# Patient Record
Sex: FEMALE | Race: WHITE | NOT HISPANIC OR LATINO | Employment: OTHER | URBAN - METROPOLITAN AREA
[De-identification: names, ages, dates, MRNs, and addresses within clinical notes are randomized per-mention and may not be internally consistent; named-entity substitution may affect disease eponyms.]

---

## 2017-01-19 ENCOUNTER — OFFICE VISIT (OUTPATIENT)
Dept: FAMILY MEDICINE | Facility: CLINIC | Age: 22
End: 2017-01-19
Payer: COMMERCIAL

## 2017-01-19 VITALS
TEMPERATURE: 97.7 F | DIASTOLIC BLOOD PRESSURE: 66 MMHG | HEIGHT: 68 IN | SYSTOLIC BLOOD PRESSURE: 100 MMHG | BODY MASS INDEX: 24.14 KG/M2 | HEART RATE: 71 BPM | WEIGHT: 159.31 LBS

## 2017-01-19 DIAGNOSIS — R30.0 DYSURIA: Primary | ICD-10-CM

## 2017-01-19 DIAGNOSIS — Z12.4 SCREENING FOR CERVICAL CANCER: ICD-10-CM

## 2017-01-19 LAB
ALBUMIN UR-MCNC: NEGATIVE MG/DL
APPEARANCE UR: CLEAR
BILIRUB UR QL STRIP: NEGATIVE
COLOR UR AUTO: YELLOW
GLUCOSE UR STRIP-MCNC: NEGATIVE MG/DL
HGB UR QL STRIP: NEGATIVE
KETONES UR STRIP-MCNC: NEGATIVE MG/DL
LEUKOCYTE ESTERASE UR QL STRIP: NEGATIVE
MICRO REPORT STATUS: NORMAL
NITRATE UR QL: NEGATIVE
PH UR STRIP: 7 PH (ref 5–7)
SP GR UR STRIP: 1.01 (ref 1–1.03)
SPECIMEN SOURCE: NORMAL
URN SPEC COLLECT METH UR: NORMAL
UROBILINOGEN UR STRIP-ACNC: 1 EU/DL (ref 0.2–1)
WET PREP SPEC: NORMAL

## 2017-01-19 PROCEDURE — G0145 SCR C/V CYTO,THINLAYER,RESCR: HCPCS | Performed by: FAMILY MEDICINE

## 2017-01-19 PROCEDURE — 87210 SMEAR WET MOUNT SALINE/INK: CPT | Performed by: FAMILY MEDICINE

## 2017-01-19 PROCEDURE — 87491 CHLMYD TRACH DNA AMP PROBE: CPT | Performed by: FAMILY MEDICINE

## 2017-01-19 PROCEDURE — 99213 OFFICE O/P EST LOW 20 MIN: CPT | Performed by: FAMILY MEDICINE

## 2017-01-19 PROCEDURE — 87591 N.GONORRHOEAE DNA AMP PROB: CPT | Performed by: FAMILY MEDICINE

## 2017-01-19 PROCEDURE — 81003 URINALYSIS AUTO W/O SCOPE: CPT | Performed by: FAMILY MEDICINE

## 2017-01-19 RX ORDER — VENLAFAXINE HYDROCHLORIDE 225 MG/1
TABLET, EXTENDED RELEASE ORAL DAILY
COMMUNITY
Start: 2016-12-16 | End: 2018-03-29

## 2017-01-19 RX ORDER — ALPRAZOLAM 0.25 MG
TABLET ORAL 3 TIMES DAILY PRN
COMMUNITY
Start: 2016-12-16 | End: 2022-12-01

## 2017-01-19 RX ORDER — PRAZOSIN HYDROCHLORIDE 1 MG/1
CAPSULE ORAL
COMMUNITY
Start: 2017-01-13 | End: 2017-03-21

## 2017-01-19 RX ORDER — HYDROXYZINE HYDROCHLORIDE 25 MG/1
TABLET, FILM COATED ORAL
COMMUNITY
Start: 2017-01-06 | End: 2017-03-21

## 2017-01-19 NOTE — Clinical Note
[facility]  [address] [facility] [facility] [address] [phone]      January 25, 2017    Kathi Enriquez  [address] [address] [address]          Dear Kathi,    I am happy to inform you that your recent cervical cancer screening test (PAP smear) was normal.      Preventative screening such as this helps insure your health for years to come.  This test should be repeated in 3 years unless otherwise directed.      You will still need to return to the clinic every year for your annual exam and other preventive tests.    Please contact the clinic if you have further questions.      Sincerely,    Lilia Woodard MD/[facility]

## 2017-01-19 NOTE — NURSING NOTE
"Chief Complaint   Patient presents with     Urinary Problem       Initial /66 mmHg  Pulse 71  Temp(Src) 97.7  F (36.5  C) (Oral)  Ht 5' 7.5\" (1.715 m)  Wt 159 lb 5 oz (72.264 kg)  BMI 24.57 kg/m2  Breastfeeding? No Estimated body mass index is 24.57 kg/(m^2) as calculated from the following:    Height as of this encounter: 5' 7.5\" (1.715 m).    Weight as of this encounter: 159 lb 5 oz (72.264 kg).  BP completed using cuff size: erwin Patel CMA          "

## 2017-01-19 NOTE — PROGRESS NOTES
"  SUBJECTIVE:                                                    Kathi Enriquez is a 21 year old female who presents to clinic today for the following health issues:    URINARY TRACT SYMPTOMS      Duration: 3-4 days    Description  dysuria, odor and color, cloudy    Intensity:  moderate    Accompanying signs and symptoms:  Fever/chills: no   Flank pain no   Nausea and vomiting: no   Vaginal symptoms: none  Abdominal/Pelvic Pain: YES    History  History of frequent UTI's: YES  History of kidney stones: no   Sexually Active: YES  Possibility of pregnancy: don't think so    Precipitating or alleviating factors: None    Therapies tried and outcome: increase fluid intake   Outcome: not helping       Different than her typical UTIs. Not having frequency. Also having more pelvic pain than she typically does.     No new vaginal discharge. New sexual partner.       Problem list and histories reviewed & adjusted, as indicated.  Additional history: none    Problem list, Medication list, Allergies, and Medical/Social/Surgical histories reviewed in EPIC and updated as appropriate.    ROS:  Constitutional, HEENT, cardiovascular, pulmonary, gi and gu systems are negative, except as otherwise noted.    OBJECTIVE:                                                    /66 mmHg  Pulse 71  Temp(Src) 97.7  F (36.5  C) (Oral)  Ht 5' 7.5\" (1.715 m)  Wt 159 lb 5 oz (72.264 kg)  BMI 24.57 kg/m2  LMP 01/08/2017  Breastfeeding? No  Body mass index is 24.57 kg/(m^2).  GENERAL: healthy, alert and no distress   (female): normal female external genitalia, normal urethral meatus, vaginal mucosa, normal cervix/adnexa/uterus without masses, thick white discharge coating vaginal walls, mild irritation of vulva    Diagnostic Test Results:  Results for orders placed or performed in visit on 01/19/17 (from the past 24 hour(s))   UA reflex to Microscopic and Culture   Result Value Ref Range    Color Urine Yellow     Appearance Urine Clear  "    Glucose Urine Negative NEG mg/dL    Bilirubin Urine Negative NEG    Ketones Urine Negative NEG mg/dL    Specific Gravity Urine 1.015 1.003 - 1.035    Blood Urine Negative NEG    pH Urine 7.0 5.0 - 7.0 pH    Protein Albumin Urine Negative NEG mg/dL    Urobilinogen Urine 1.0 0.2 - 1.0 EU/dL    Nitrite Urine Negative NEG    Leukocyte Esterase Urine Negative NEG    Source Midstream Urine         ASSESSMENT/PLAN:                                                      1. Dysuria - discussed the possibility of a vaginal infection causing pelvic pain, will test today  - UA reflex to Microscopic and Culture  - Neisseria gonorrhoeae PCR  - Chlamydia trachomatis PCR  - Wet prep    2. Screening for cervical cancer  - Pap imaged thin layer screen only - recommended age 21 - 24 years      Lilia Woodard MD  [facility]

## 2017-01-20 LAB
C TRACH DNA SPEC QL NAA+PROBE: NORMAL
N GONORRHOEA DNA SPEC QL NAA+PROBE: NORMAL
SPECIMEN SOURCE: NORMAL
SPECIMEN SOURCE: NORMAL

## 2017-01-20 ASSESSMENT — PATIENT HEALTH QUESTIONNAIRE - PHQ9: SUM OF ALL RESPONSES TO PHQ QUESTIONS 1-9: 23

## 2017-01-24 LAB
COPATH REPORT: NORMAL
PAP: NORMAL

## 2017-03-21 ENCOUNTER — OFFICE VISIT (OUTPATIENT)
Dept: FAMILY MEDICINE | Facility: CLINIC | Age: 22
End: 2017-03-21
Payer: COMMERCIAL

## 2017-03-21 DIAGNOSIS — Z11.3 SCREEN FOR STD (SEXUALLY TRANSMITTED DISEASE): Primary | ICD-10-CM

## 2017-03-21 DIAGNOSIS — B96.89 BACTERIAL VAGINOSIS: ICD-10-CM

## 2017-03-21 DIAGNOSIS — N76.0 BACTERIAL VAGINOSIS: ICD-10-CM

## 2017-03-21 LAB
ALBUMIN UR-MCNC: NEGATIVE MG/DL
APPEARANCE UR: CLEAR
BILIRUB UR QL STRIP: NEGATIVE
COLOR UR AUTO: YELLOW
GLUCOSE UR STRIP-MCNC: NEGATIVE MG/DL
HGB UR QL STRIP: NEGATIVE
KETONES UR STRIP-MCNC: NEGATIVE MG/DL
LEUKOCYTE ESTERASE UR QL STRIP: NEGATIVE
MICRO REPORT STATUS: ABNORMAL
NITRATE UR QL: NEGATIVE
PH UR STRIP: 7.5 PH (ref 5–7)
SP GR UR STRIP: 1.01 (ref 1–1.03)
SPECIMEN SOURCE: ABNORMAL
URN SPEC COLLECT METH UR: ABNORMAL
UROBILINOGEN UR STRIP-ACNC: 0.2 EU/DL (ref 0.2–1)
WET PREP SPEC: ABNORMAL

## 2017-03-21 PROCEDURE — 87210 SMEAR WET MOUNT SALINE/INK: CPT | Performed by: PHYSICIAN ASSISTANT

## 2017-03-21 PROCEDURE — 87591 N.GONORRHOEAE DNA AMP PROB: CPT | Performed by: PHYSICIAN ASSISTANT

## 2017-03-21 PROCEDURE — 87491 CHLMYD TRACH DNA AMP PROBE: CPT | Performed by: PHYSICIAN ASSISTANT

## 2017-03-21 PROCEDURE — 81003 URINALYSIS AUTO W/O SCOPE: CPT | Performed by: PHYSICIAN ASSISTANT

## 2017-03-21 PROCEDURE — 99213 OFFICE O/P EST LOW 20 MIN: CPT | Performed by: PHYSICIAN ASSISTANT

## 2017-03-21 RX ORDER — METRONIDAZOLE 500 MG/1
500 TABLET ORAL 2 TIMES DAILY
Qty: 14 TABLET | Refills: 0 | Status: SHIPPED | OUTPATIENT
Start: 2017-03-21 | End: 2017-08-03

## 2017-03-21 NOTE — PROGRESS NOTES
SUBJECTIVE:                                                    Kathi Enriquez is a 21 year old female who presents to clinic today for the following health issues:      Vaginal Symptoms     Onset: Last Thursday    Description:  Vaginal Discharge: curd-like green in color   Itching (Pruritis): no   Burning sensation:  no   Odor: YES    Accompanying Signs & Symptoms:  Pain with Urination: no   Abdominal Pain: YES  Fever: no    History:   Sexually active: YES  New Partner: YES  Possibility of Pregnancy:  Patient's last menstrual period was 03/04/2017.      Precipitating factors:   Recent Antibiotic Use: no     Alleviating factors:     Therapies Tried and outcome:         Problem list and histories reviewed & adjusted, as indicated.  Additional history: as documented    Patient Active Problem List   Diagnosis     GERD (gastroesophageal reflux disease)     Anxiety     Major depressive disorder, recurrent episode, moderate (H)     Suicidal ideation     No past surgical history on file.    Social History   Substance Use Topics     Smoking status: Current Some Day Smoker     Packs/day: 0.25     Types: Cigarettes     Smokeless tobacco: Never Used     Alcohol use Yes     Family History   Problem Relation Age of Onset     CANCER Paternal Grandmother      Lymphoma     CANCER Paternal Grandfather      Pancreatic     DIABETES Father      Type II Diabetes     CANCER Father      Melanoma     CANCER Maternal Grandfather      Brain Tumor, Bladder Cancer     Cancer - colorectal Maternal Grandfather      Asthma Sister      Blood Disease Sister      Blood Clots in the lung           Reviewed and updated as needed this visit by clinical staff  Allergies       Reviewed and updated as needed this visit by Provider         ROS:  Constitutional, HEENT, cardiovascular, pulmonary, gi and gu systems are negative, except as otherwise noted.    OBJECTIVE:                                                    LMP 03/04/2017  There is no height  or weight on file to calculate BMI.  GENERAL APPEARANCE: healthy, alert and no distress  CV: regular rates and rhythm, normal S1 S2, no S3 or S4 and no murmur, click or rub  ABDOMEN: soft, nontender, without hepatosplenomegaly or masses and bowel sounds normal    Results for orders placed or performed in visit on 03/21/17   UA reflex to Microscopic and Culture   Result Value Ref Range    Color Urine Yellow     Appearance Urine Clear     Glucose Urine Negative NEG mg/dL    Bilirubin Urine Negative NEG    Ketones Urine Negative NEG mg/dL    Specific Gravity Urine 1.015 1.003 - 1.035    Blood Urine Negative NEG    pH Urine 7.5 (H) 5.0 - 7.0 pH    Protein Albumin Urine Negative NEG mg/dL    Urobilinogen Urine 0.2 0.2 - 1.0 EU/dL    Nitrite Urine Negative NEG    Leukocyte Esterase Urine Negative NEG    Source Midstream Urine    Wet prep   Result Value Ref Range    Specimen Description Vagina     Wet Prep (A)      No Trichomonas seen  Clue cells seen  No yeast seen      Micro Report Status FINAL 03/21/2017           ASSESSMENT/PLAN:                                                            1. Bacterial vaginosis    - Wet prep  - UA reflex to Microscopic and Culture  - metroNIDAZOLE (FLAGYL) 500 MG tablet; Take 1 tablet (500 mg) by mouth 2 times daily  Dispense: 14 tablet; Refill: 0    2. Screen for STD (sexually transmitted disease)    - Neisseria gonorrhoeae PCR  - Chlamydia trachomatis PCR        Maddie Francois PA-C, AYAZ  [facility]

## 2017-03-21 NOTE — LETTER
[facility]  [address] [place], [address]  [phone]      March 22, 2017    Kathi Enriquez  [address] [address] [address]          Dear Kathi,    The results of your recent tests were normal.  Enclosed is a copy of the results.  It was a pleasure to see you at your last appointment.    If you have any questions or concerns, please call myself or my nurse at [phone].          Sincerely,    Maddie Francois PA-C

## 2017-03-21 NOTE — LETTER
My Depression Action Plan  Name: Kathi Enriquez   Date of Birth 1995  Date: 3/21/2017    My doctor: No Ref-Primary, Physician   My clinic: [address] [address] [phone]          GREEN    ZONE   Good Control    What it looks like:     Things are going generally well. You have normal up s and down s. You may even feel depressed from time to time, but bad moods usually last less than a day.   What you need to do:  1. Continue to care for yourself (see self care plan)  2. Check your depression survival kit and update it as needed  3. Follow your physician s recommendations including any medication.  4. Do not stop taking medication unless you consult with your physician first.           YELLOW         ZONE Getting Worse    What it looks like:     Depression is starting to interfere with your life.     It may be hard to get out of bed; you may be starting to isolate yourself from others.    Symptoms of depression are starting to last most all day and this has happened for several days.     You may have suicidal thoughts but they are not constant.   What you need to do:     1. Call your care team, your response to treatment will improve if you keep your care team informed of your progress. Yellow periods are signs an adjustment may need to be made.     2. Continue your self-care, even if you have to fake it!    3. Talk to someone in your support network    4. Open up your depression survival kit           RED    ZONE Medical Alert - Get Help    What it looks like:     Depression is seriously interfering with your life.     You may experience these or other symptoms: You can t get out of bed most days, can t work or engage in other necessary activities, you have trouble taking care of basic hygiene, or basic responsibilities, thoughts of suicide or death that will not go away, self-injurious behavior.     What you need to  do:  1. Call your care team and request a same-day appointment. If they are not available (weekends or after hours) call your local crisis line, emergency room or 911.      Electronically signed by: Maddie Francois PA-C, March 21, 2017    Depression Self Care Plan / Survival Kit    Self-Care for Depression  Here s the deal. Your body and mind are really not as separate as most people think.  What you do and think affects how you feel and how you feel influences what you do and think. This means if you do things that people who feel good do, it will help you feel better.  Sometimes this is all it takes.  There is also a place for medication and therapy depending on how severe your depression is, so be sure to consult with your medical provider and/ or Behavioral Health Consultant if your symptoms are worsening or not improving.     In order to better manage my stress, I will:    Exercise  Get some form of exercise, every day. This will help reduce pain and release endorphins, the  feel good  chemicals in your brain. This is almost as good as taking antidepressants!  This is not the same as joining a gym and then never going! (they count on that by the way ) It can be as simple as just going for a walk or doing some gardening, anything that will get you moving.      Hygiene   Maintain good hygiene (Get out of bed in the morning, Make your bed, Brush your teeth, Take a shower, and Get dressed like you were going to work, even if you are unemployed).  If your clothes don't fit try to get ones that do.    Diet  I will strive to eat foods that are good for me, drink plenty of water, and avoid excessive sugar, caffeine, alcohol, and other mood-altering substances.  Some foods that are helpful in depression are: complex carbohydrates, B vitamins, flaxseed, fish or fish oil, fresh fruits and vegetables.    Psychotherapy  I agree to participate in Individual Therapy (if recommended).    Medication  If prescribed  medications, I agree to take them.  Missing doses can result in serious side effects.  I understand that drinking alcohol, or other illicit drug use, may cause potential side effects.  I will not stop my medication abruptly without first discussing it with my provider.    Staying Connected With Others  I will stay in touch with my friends, family members, and my primary care provider/team.    Use your imagination  Be creative.  We all have a creative side; it doesn t matter if it s oil painting, sand castles, or mud pies! This will also kick up the endorphins.    Witness Beauty  (AKA stop and smell the roses) Take a look outside, even in mid-winter. Notice colors, textures. Watch the squirrels and birds.     Service to others  Be of service to others.  There is always someone else in need.  By helping others we can  get out of ourselves  and remember the really important things.  This also provides opportunities for practicing all the other parts of the program.    Humor  Laugh and be silly!  Adjust your TV habits for less news and crime-drama and more comedy.    Control your stress  Try breathing deep, massage therapy, biofeedback, and meditation. Find time to relax each day.     My support system    Clinic Contact:  Phone number:    Contact 1:  Phone number:    Contact 2:  Phone number:    [place]:  Phone number:    Therapist:  Phone number:    [facility] crisis center:    Phone number:    Other community support:  Phone number:

## 2017-03-21 NOTE — MR AVS SNAPSHOT
"              After Visit Summary   3/21/2017    Kathi Enriquez    MRN: [id number]           Patient Information     Date Of Birth          1995        Visit Information        Provider Department      3/21/2017 11:00 AM Maddie Francois PA-C [facility]        Today's Diagnoses     Screen for STD (sexually transmitted disease)    -  1    Bacterial vaginosis           Follow-ups after your visit        Who to contact     If you have questions or need follow up information about today's clinic visit or your schedule please contact [facility] directly at [phone].  Normal or non-critical lab and imaging results will be communicated to you by [employer], letter or phone within 4 business days after the clinic has received the results. If you do not hear from us within 7 days, please contact the clinic through [employer] or phone. If you have a critical or abnormal lab result, we will notify you by phone as soon as possible.  Submit refill requests through [employer] or call your pharmacy and they will forward the refill request to us. Please allow 3 business days for your refill to be completed.          Additional Information About Your Visit        MyChart Information     [employer] lets you send messages to your doctor, view your test results, renew your prescriptions, schedule appointments and more. To sign up, go to [email or web address] . Click on \"Log in\" on the left side of the screen, which will take you to the Welcome page. Then click on \"Sign up Now\" on the right side of the page.     You will be asked to enter the access code listed below, as well as some personal information. Please follow the directions to create your username and password.     Your access code is: [email or web address]  Expires: 2017 12:24 PM     Your access code will  in 90 days. If you need help or a new code, please call your [facility] or [phone].        Care EveryWhere ID  "    This is your Care EveryWhere ID. This could be used by other organizations to access your [facility] medical records  [id number]        Your Vitals Were     Last Period                   03/04/2017            Blood Pressure from Last 3 Encounters:   01/19/17 100/66   06/28/16 119/64   06/21/16 111/75    Weight from Last 3 Encounters:   01/19/17 159 lb 5 oz (72.3 kg)   06/21/16 154 lb 9.6 oz (70.1 kg)   06/20/16 156 lb 8.4 oz (71 kg)              We Performed the Following     Chlamydia trachomatis PCR     DEPRESSION ACTION PLAN (DAP)     Neisseria gonorrhoeae PCR     UA reflex to Microscopic and Culture     Wet prep          Today's Medication Changes          These changes are accurate as of: 3/21/17 12:24 PM.  If you have any questions, ask your nurse or doctor.               Start taking these medicines.        Dose/Directions    metroNIDAZOLE 500 MG tablet   Commonly known as:  FLAGYL   Used for:  Bacterial vaginosis        Dose:  500 mg   Take 1 tablet (500 mg) by mouth 2 times daily   Quantity:  14 tablet   Refills:  0         Stop taking these medicines if you haven't already. Please contact your care team if you have questions.     hydrOXYzine 25 MG tablet   Commonly known as:  ATARAX           prazosin 1 MG capsule   Commonly known as:  MINIPRESS                Where to get your medicines      These medications were sent to [id number] [address]  [address], [facility] [address]     Phone:  [phone]     metroNIDAZOLE 500 MG tablet                Primary Care Provider    Physician No Ref-Primary       No address on file        Thank you!     Thank you for choosing [facility]  for your care. Our goal is always to provide you with excellent care. Hearing back from our patients is one way we can continue to improve our services. Please take a few minutes to complete the written survey that you may receive in the mail after your visit with us.  Thank you!             Your Updated Medication List - Protect others around you: Learn how to safely use, store and throw away your medicines at www.disposemymeds.org.          This list is accurate as of: 3/21/17 12:24 PM.  Always use your most recent med list.                   Brand Name Dispense Instructions for use    ALPRAZolam 0.25 MG tablet    XANAX     3 times daily as needed       metroNIDAZOLE 500 MG tablet    FLAGYL    14 tablet    Take 1 tablet (500 mg) by mouth 2 times daily       QUEtiapine 100 MG tablet    SEROquel    30 tablet    Take 1 tablet (100 mg) by mouth At Bedtime       venlafaxine 225 MG Tb24 24 hr tablet    EFFEXOR-ER     daily

## 2017-08-03 ENCOUNTER — OFFICE VISIT (OUTPATIENT)
Dept: FAMILY MEDICINE | Facility: CLINIC | Age: 22
End: 2017-08-03
Payer: COMMERCIAL

## 2017-08-03 VITALS
SYSTOLIC BLOOD PRESSURE: 125 MMHG | TEMPERATURE: 98.5 F | RESPIRATION RATE: 20 BRPM | BODY MASS INDEX: 24.07 KG/M2 | HEART RATE: 85 BPM | DIASTOLIC BLOOD PRESSURE: 80 MMHG | WEIGHT: 156 LBS

## 2017-08-03 DIAGNOSIS — R50.9 FEVER, UNSPECIFIED: Primary | ICD-10-CM

## 2017-08-03 LAB
DEPRECATED S PYO AG THROAT QL EIA: NORMAL
MICRO REPORT STATUS: NORMAL
SPECIMEN SOURCE: NORMAL

## 2017-08-03 PROCEDURE — 87880 STREP A ASSAY W/OPTIC: CPT | Performed by: PHYSICIAN ASSISTANT

## 2017-08-03 PROCEDURE — 87081 CULTURE SCREEN ONLY: CPT | Performed by: PHYSICIAN ASSISTANT

## 2017-08-03 PROCEDURE — 99213 OFFICE O/P EST LOW 20 MIN: CPT | Performed by: PHYSICIAN ASSISTANT

## 2017-08-03 NOTE — NURSING NOTE
"  Chief Complaint   Patient presents with     Fever       Initial /80 (BP Location: Right arm, Patient Position: Chair, Cuff Size: Adult Regular)  Pulse 85  Temp 98.5  F (36.9  C) (Oral)  Resp 20  Wt 156 lb (70.8 kg)  BMI 24.07 kg/m2 Estimated body mass index is 24.07 kg/(m^2) as calculated from the following:    Height as of 1/19/17: 5' 7.5\" (1.715 m).    Weight as of this encounter: 156 lb (70.8 kg).  Medication Reconciliation: complete      ELMER Fonseca    "

## 2017-08-03 NOTE — MR AVS SNAPSHOT
After Visit Summary   8/3/2017    Kathi Enriquez    MRN: [id number]           Patient Information     Date Of Birth          1995        Visit Information        Provider Department      8/3/2017 8:45 AM Adams Austin PA-C [facility]        Today's Diagnoses     Fever, unspecified    -  1      Care Instructions      Viral Gastroenteritis (Adult)    Gastroenteritis is commonly called the stomach flu. It is most often caused by a virus that affects the stomach and intestinal tract and usually lasts from 2 to 7 days. Common viruses causing gastroenteritis include norovirus, rotavirus, and hepatitis A. Non-viral causes of gastroenteritis include bacteria, parasites, and toxins.  The danger from repeated vomiting or diarrhea is dehydration. This is the loss of too much fluid from the body. When this occurs, body fluids must be replaced. Antibiotics do not help with this illness because it is usually viral.Simple home treatment will be helpful.  Symptoms of viral gastroenteritis may include:    Watery, loose stools    Stomach pain or abdominal cramps    Fever and chills    Nausea and vomiting    Loss of bowel control    Headache  Home care  Gastroenteritis is transmitted by contact with the stool or vomit of an infected person. This can occur from person to person or from contact with a contaminated surface.  Follow these guidelines when caring for yourself at home:    If symptoms are severe, rest at home for the next 24 hours or until you are feeling better.    Wash your hands with soap and water or use alcohol-based  to prevent the spread of infection. Wash your hands after touching anyone who is sick.    Wash your hands or use alcohol-based  after using the toilet and before meals. Clean the toilet after each use.  Remember these tips when preparing food:    People with diarrhea should not prepare or serve food to others. When preparing foods, wash  your hands before and after.    Wash your hands after using cutting boards, countertops, knives, or utensils that have been in contact with raw food.    Keep uncooked meats away from cooked and ready-to-eat foods.  Medicine  You may use acetaminophen or NSAID medicines like ibuprofen or naproxen to control fever unless another medicine was given. If you have chronic liver or kidney disease, talk with your healthcare provider before using these medicines. Also talk with your provider if you've had a stomach ulcer or gastrointestinal bleeding. Don't give aspirin to anyone under 18 years of age who is ill with a fever. It may cause severe liver damage. Don't use NSAIDS is you are already taking one for another condition (like arthritis) or are on aspirin (such as for heart disease or after a stroke).  If medicine for vomiting or diarrhea are prescribed, take these only as directed. Do not take over-the-counter medicines for vomiting or diarrhea unless instructed by your healthcare provider.  Diet  Follow these guidelines for food:    Water and liquids are important so you don't get dehydrated. Drink a small amount at a time or suck on ice chips if you are vomiting.    If you eat, avoid fatty, greasy, spicy, or fried foods.    Don't eat dairy if you have diarrhea. This can make diarrhea worse.    Avoid tobacco, alcohol, and caffeine which may worsen symptoms.  During the first 24 hours (the first full day), follow the diet below:    Beverages. Sports drinks, soft drinks without caffeine, ginger ale, mineral water (plain or flavored), decaffeinated tea and coffee. If you are very dehydrated, sports drinks aren't a good choice. They have too much sugar and not enough electrolytes. In this case, commercially available products called oral rehydration solutions, are best.    Soups. Eat clear broth, consommé, and bouillon.    Desserts. Eat gelatin, popsicles, and fruit juice bars.  During the next 24 hours (the second day),  you may add the following to the above:    Hot cereal, plain toast, bread, rolls, and crackers    Plain noodles, rice, mashed potatoes, chicken noodle or rice soup    Unsweetened canned fruit (avoid pineapple), bananas    Limit fat intake to less than 15 grams per day. Do this by avoiding margarine, butter, oils, mayonnaise, sauces, gravies, fried foods, peanut butter, meat, poultry, and fish.    Limit fiber and avoid raw or cooked vegetables, fresh fruits (except bananas), and bran cereals.    Limit caffeine and chocolate. Don't use spices or seasonings other than salt.    Limit dairy products.    Avoid alcohol.  During the next 24 hours:    Gradually resume a normal diet as you feel better and your symptoms improve.    If at any time it starts getting worse again, go back to clear liquids until you feel better.  Follow-up care  Follow up with your healthcare provider, or as advised. Call your provider if you don't get better within 24 hours or if diarrhea lasts more than a week. Also follow up if you are unable to keep down liquids and get dehydrated. If a stool (diarrhea) sample was taken, call as directed for the results.  Call 911  Call 911 if any of these occur:    Trouble breathing    Chest pain    Confused    Severe drowsiness or trouble awakening    Fainting or loss of consciousness    Rapid heart rate    Seizure    Stiff neck  When to seek medical advice  Call your healthcare provider right away if any of these occur:    Abdominal pain that gets worse    Continued vomiting (unable to keep liquids down)    Frequent diarrhea (more than 5 times a day)    Blood in vomit or stool (black or red color)    Dark urine, reduced urine output, or extreme thirst    Weakness or dizziness    Drowsiness    Fever of 100.4 F (38 C) oral or higher that does not get better with fever medicine    New rash  Date Last Reviewed: 1/3/2016    7792-9705 [employer]. [address] [place] [address], [place] [address]. All rights  "reserved. This information is not intended as a substitute for professional medical care. Always follow your healthcare professional's instructions.        [place] Diet  Your healthcare provider may recommend a bland diet if you have an upset stomach. It consists of foods that are mild and easy to digest. It is better to eat small frequent meals rather than 3 large meals a day.    Beverages  OK: Fruit juices, non-caffeinated teas and coffee, non-carbonated cason  Avoid: Carbonated beverage, caffeinated tea and coffee, all alcoholic beverages  Bread  OK: Refined white, wheat or rye bread, leighton or soda crackers, [place] toast, plain rolls, bagels  Avoid: Whole-grain bread  Cereal  OK: Refined cereals: cooked or ready to eat  Avoid: Whole-grain cereals and granola, or those containing bran, seeds or nuts  Desserts  OK: Peanut butter and all others except those to \"avoid\"  Avoid: Chocolate, cocoa, coconut, popcorn, nuts, seeds, jam, marmalade  Fruits  OK: Canned, cooked, frozen or fresh fruits without seeds or tough skin  Avoid: Olives, skin and seeds of fruit  Meats  OK: All fresh or preserved meat, fish and fowl  Avoid: Any that are prepared with those spices to \"avoid\"  Cheese and eggs  OK: Eggs, cottage cheese, cream cheese, other cheeses  Avoid: All cheeses made with those spices to \"avoid\"  Potatoes and pasta  OK: Potato, rice, macaroni, noodles, spaghetti  Avoid: None  Soups  OK: All soups without heavy seasoning  Avoid: Soups made with those spices to \"avoid\"  Vegetables  OK: Canned, cooked, fresh or frozen mildly flavored vegetables without seeds, skins or coarse fiber  Avoid: Vegetables prepared with those spices to \"avoid\"; skin and seeds of vegetables and those with coarse fiber  Spices  OK: Salt, lemon and lime juice, vinegar, all extracts, annie, cinnamon, thyme, mace, allspice, paprika  Avoid: Chili powder, cloves, pepper, seed spices, garlic, gravy pickles, highly seasoned salad dressings  Date Last " "Reviewed: 2015-2017 [employer]. [address] [place] [address], [place] [address]. All rights reserved. This information is not intended as a substitute for professional medical care. Always follow your healthcare professional's instructions.                Follow-ups after your visit        Who to contact     If you have questions or need follow up information about today's clinic visit or your schedule please contact [facility] directly at [phone].  Normal or non-critical lab and imaging results will be communicated to you by MyChart, letter or phone within 4 business days after the clinic has received the results. If you do not hear from us within 7 days, please contact the clinic through [employer] or phone. If you have a critical or abnormal lab result, we will notify you by phone as soon as possible.  Submit refill requests through [employer] or call your pharmacy and they will forward the refill request to us. Please allow 3 business days for your refill to be completed.          Additional Information About Your Visit        [facility] Information     [employer] lets you send messages to your doctor, view your test results, renew your prescriptions, schedule appointments and more. To sign up, go to [email or web address] . Click on \"Log in\" on the left side of the screen, which will take you to the Welcome page. Then click on \"Sign up Now\" on the right side of the page.     You will be asked to enter the access code listed below, as well as some personal information. Please follow the directions to create your username and password.     Your access code is: [email or web address]  Expires: 2017  9:20 AM     Your access code will  in 90 days. If you need help or a new code, please call your [facility] or [phone].        Care EveryWhere ID     This is your Care EveryWhere ID. This could be used by other organizations to access your [place] medical " records  [id number]        Your Vitals Were     Pulse Temperature Respirations BMI (Body Mass Index)          85 98.5  F (36.9  C) (Oral) 20 24.07 kg/m2         Blood Pressure from Last 3 Encounters:   08/03/17 125/80   01/19/17 100/66   06/28/16 119/64    Weight from Last 3 Encounters:   08/03/17 156 lb (70.8 kg)   01/19/17 159 lb 5 oz (72.3 kg)   06/21/16 154 lb 9.6 oz (70.1 kg)              We Performed the Following     Beta strep group A culture     Rapid strep screen          Today's Medication Changes          These changes are accurate as of: 8/3/17  9:20 AM.  If you have any questions, ask your nurse or doctor.               Stop taking these medicines if you haven't already. Please contact your care team if you have questions.     QUEtiapine 100 MG tablet   Commonly known as:  SEROquel   Stopped by:  Adams Austin PA-C                    Primary Care Provider    Physician No Ref-Primary       No address on file        Equal Access to Services     [facility] : Hadii aad ku hadasho Soomaali, waaxda luqadaha, qaybta kaalmada adeegyada, leti moralez . So [facility] [phone].    ATENCIÓN: Si habla español, tiene a lynch disposición servicios gratuitos de asistencia lingüística. Llame al [phone].    We comply with applicable federal civil rights laws and [place] laws. We do not discriminate on the basis of race, color, national origin, age, disability sex, sexual orientation or gender identity.            Thank you!     Thank you for choosing [facility]  for your care. Our goal is always to provide you with excellent care. Hearing back from our patients is one way we can continue to improve our services. Please take a few minutes to complete the written survey that you may receive in the mail after your visit with us. Thank you!             Your Updated Medication List - Protect others around you: Learn how to safely use, store and throw away your  medicines at www.disposemymeds.org.          This list is accurate as of: 8/3/17  9:20 AM.  Always use your most recent med list.                   Brand Name Dispense Instructions for use Diagnosis    ALPRAZolam 0.25 MG tablet    XANAX     3 times daily as needed        venlafaxine 225 MG Tb24 24 hr tablet    EFFEXOR-ER     daily

## 2017-08-03 NOTE — PATIENT INSTRUCTIONS
Viral Gastroenteritis (Adult)    Gastroenteritis is commonly called the stomach flu. It is most often caused by a virus that affects the stomach and intestinal tract and usually lasts from 2 to 7 days. Common viruses causing gastroenteritis include norovirus, rotavirus, and hepatitis A. Non-viral causes of gastroenteritis include bacteria, parasites, and toxins.  The danger from repeated vomiting or diarrhea is dehydration. This is the loss of too much fluid from the body. When this occurs, body fluids must be replaced. Antibiotics do not help with this illness because it is usually viral.Simple home treatment will be helpful.  Symptoms of viral gastroenteritis may include:    Watery, loose stools    Stomach pain or abdominal cramps    Fever and chills    Nausea and vomiting    Loss of bowel control    Headache  Home care  Gastroenteritis is transmitted by contact with the stool or vomit of an infected person. This can occur from person to person or from contact with a contaminated surface.  Follow these guidelines when caring for yourself at home:    If symptoms are severe, rest at home for the next 24 hours or until you are feeling better.    Wash your hands with soap and water or use alcohol-based  to prevent the spread of infection. Wash your hands after touching anyone who is sick.    Wash your hands or use alcohol-based  after using the toilet and before meals. Clean the toilet after each use.  Remember these tips when preparing food:    People with diarrhea should not prepare or serve food to others. When preparing foods, wash your hands before and after.    Wash your hands after using cutting boards, countertops, knives, or utensils that have been in contact with raw food.    Keep uncooked meats away from cooked and ready-to-eat foods.  Medicine  You may use acetaminophen or NSAID medicines like ibuprofen or naproxen to control fever unless another medicine was given. If you have chronic  liver or kidney disease, talk with your healthcare provider before using these medicines. Also talk with your provider if you've had a stomach ulcer or gastrointestinal bleeding. Don't give aspirin to anyone under 18 years of age who is ill with a fever. It may cause severe liver damage. Don't use NSAIDS is you are already taking one for another condition (like arthritis) or are on aspirin (such as for heart disease or after a stroke).  If medicine for vomiting or diarrhea are prescribed, take these only as directed. Do not take over-the-counter medicines for vomiting or diarrhea unless instructed by your healthcare provider.  Diet  Follow these guidelines for food:    Water and liquids are important so you don't get dehydrated. Drink a small amount at a time or suck on ice chips if you are vomiting.    If you eat, avoid fatty, greasy, spicy, or fried foods.    Don't eat dairy if you have diarrhea. This can make diarrhea worse.    Avoid tobacco, alcohol, and caffeine which may worsen symptoms.  During the first 24 hours (the first full day), follow the diet below:    Beverages. Sports drinks, soft drinks without caffeine, ginger ale, mineral water (plain or flavored), decaffeinated tea and coffee. If you are very dehydrated, sports drinks aren't a good choice. They have too much sugar and not enough electrolytes. In this case, commercially available products called oral rehydration solutions, are best.    Soups. Eat clear broth, consommé, and bouillon.    Desserts. Eat gelatin, popsicles, and fruit juice bars.  During the next 24 hours (the second day), you may add the following to the above:    Hot cereal, plain toast, bread, rolls, and crackers    Plain noodles, rice, mashed potatoes, chicken noodle or rice soup    Unsweetened canned fruit (avoid pineapple), bananas    Limit fat intake to less than 15 grams per day. Do this by avoiding margarine, butter, oils, mayonnaise, sauces, gravies, fried foods, peanut  butter, meat, poultry, and fish.    Limit fiber and avoid raw or cooked vegetables, fresh fruits (except bananas), and bran cereals.    Limit caffeine and chocolate. Don't use spices or seasonings other than salt.    Limit dairy products.    Avoid alcohol.  During the next 24 hours:    Gradually resume a normal diet as you feel better and your symptoms improve.    If at any time it starts getting worse again, go back to clear liquids until you feel better.  Follow-up care  Follow up with your healthcare provider, or as advised. Call your provider if you don't get better within 24 hours or if diarrhea lasts more than a week. Also follow up if you are unable to keep down liquids and get dehydrated. If a stool (diarrhea) sample was taken, call as directed for the results.  Call 911  Call 911 if any of these occur:    Trouble breathing    Chest pain    Confused    Severe drowsiness or trouble awakening    Fainting or loss of consciousness    Rapid heart rate    Seizure    Stiff neck  When to seek medical advice  Call your healthcare provider right away if any of these occur:    Abdominal pain that gets worse    Continued vomiting (unable to keep liquids down)    Frequent diarrhea (more than 5 times a day)    Blood in vomit or stool (black or red color)    Dark urine, reduced urine output, or extreme thirst    Weakness or dizziness    Drowsiness    Fever of 100.4 F (38 C) oral or higher that does not get better with fever medicine    New rash  Date Last Reviewed: 1/3/2016    0747-5997 [employer]. [address] [place] [address], [place] [address]. All rights reserved. This information is not intended as a substitute for professional medical care. Always follow your healthcare professional's instructions.        [place] Diet  Your healthcare provider may recommend a bland diet if you have an upset stomach. It consists of foods that are mild and easy to digest. It is better to eat small frequent meals rather than 3  "large meals a day.    Beverages  OK: Fruit juices, non-caffeinated teas and coffee, non-carbonated cason  Avoid: Carbonated beverage, caffeinated tea and coffee, all alcoholic beverages  Bread  OK: Refined white, wheat or rye bread, leighton or soda crackers, [place] toast, plain rolls, bagels  Avoid: Whole-grain bread  Cereal  OK: Refined cereals: cooked or ready to eat  Avoid: Whole-grain cereals and granola, or those containing bran, seeds or nuts  Desserts  OK: Peanut butter and all others except those to \"avoid\"  Avoid: Chocolate, cocoa, coconut, popcorn, nuts, seeds, jam, marmalade  Fruits  OK: Canned, cooked, frozen or fresh fruits without seeds or tough skin  Avoid: Olives, skin and seeds of fruit  Meats  OK: All fresh or preserved meat, fish and fowl  Avoid: Any that are prepared with those spices to \"avoid\"  Cheese and eggs  OK: Eggs, cottage cheese, cream cheese, other cheeses  Avoid: All cheeses made with those spices to \"avoid\"  Potatoes and pasta  OK: Potato, rice, macaroni, noodles, spaghetti  Avoid: None  Soups  OK: All soups without heavy seasoning  Avoid: Soups made with those spices to \"avoid\"  Vegetables  OK: Canned, cooked, fresh or frozen mildly flavored vegetables without seeds, skins or coarse fiber  Avoid: Vegetables prepared with those spices to \"avoid\"; skin and seeds of vegetables and those with coarse fiber  Spices  OK: Salt, lemon and lime juice, vinegar, all extracts, annie, cinnamon, thyme, mace, allspice, paprika  Avoid: Chili powder, cloves, pepper, seed spices, garlic, gravy pickles, highly seasoned salad dressings  Date Last Reviewed: 11/20/2015 2000-2017 [employer]. [address] [place] [address], [place] [address]. All rights reserved. This information is not intended as a substitute for professional medical care. Always follow your healthcare professional's instructions.        "

## 2017-08-03 NOTE — PROGRESS NOTES
SUBJECTIVE:                                                    Kathi Enriquez is a 21 year old female who presents to clinic today for the following health issues:      Acute Illness   Acute illness concerns: fever  Onset: Monday pm    Fever: YES-up to 104 Tues    Chills/Sweats: YES    Headache (location?): no     Sinus Pressure:YES    Conjunctivitis:  no    Ear Pain: YES-now resolved    Rhinorrhea: YES    Congestion: YES    Sore Throat: YES-started last pm     Cough: YES-productive of clear sputum    Wheeze: no     Decreased Appetite: YES    Nausea: YES    Vomiting: YES-4x last night    Diarrhea:  no     Dysuria/Freq.: no     Fatigue/Achiness: YES    Sick/Strep Exposure: no      Therapies Tried and outcome: aleve/advil      Problem list and histories reviewed & adjusted, as indicated.  Additional history: as documented    Patient Active Problem List   Diagnosis     GERD (gastroesophageal reflux disease)     Anxiety     Major depressive disorder, recurrent episode, moderate (H)     Suicidal ideation     History reviewed. No pertinent surgical history.    Social History   Substance Use Topics     Smoking status: Current Every Day Smoker     Packs/day: 0.25     Types: Cigarettes     Smokeless tobacco: Never Used     Alcohol use Yes     Family History   Problem Relation Age of Onset     CANCER Paternal Grandmother      Lymphoma     CANCER Paternal Grandfather      Pancreatic     DIABETES Father      Type II Diabetes     CANCER Father      Melanoma     CANCER Maternal Grandfather      Brain Tumor, Bladder Cancer     Cancer - colorectal Maternal Grandfather      Asthma Sister      Blood Disease Sister      Blood Clots in the lung         Current Outpatient Prescriptions   Medication Sig Dispense Refill     venlafaxine (EFFEXOR-ER) 225 MG TB24 24 hr tablet daily       ALPRAZolam (XANAX) 0.25 MG tablet 3 times daily as needed       No Known Allergies  BP Readings from Last 3 Encounters:   08/03/17 125/80   01/19/17  100/66   06/28/16 119/64    Wt Readings from Last 3 Encounters:   08/03/17 156 lb (70.8 kg)   01/19/17 159 lb 5 oz (72.3 kg)   06/21/16 154 lb 9.6 oz (70.1 kg)                        Reviewed and updated as needed this visit by clinical staffTobacco  Allergies  Meds  Problems  Med Hx  Surg Hx  Fam Hx  Soc Hx        Reviewed and updated as needed this visit by Provider  Allergies  Meds  Problems         ROS:  Constitutional, HEENT, cardiovascular, pulmonary, gi and gu systems are negative, except as otherwise noted.      OBJECTIVE:   /80 (BP Location: Right arm, Patient Position: Chair, Cuff Size: Adult Regular)  Pulse 85  Temp 98.5  F (36.9  C) (Oral)  Resp 20  Wt 156 lb (70.8 kg)  BMI 24.07 kg/m2  Body mass index is 24.07 kg/(m^2).  GENERAL: healthy, alert and no distress  EYES: Eyes grossly normal to inspection, PERRL and conjunctivae and sclerae normal  HENT: normal cephalic/atraumatic, both ears: clear effusion, nasal mucosa edematous , oral mucous membranes moist, tonsillar hypertrophy and tonsillar erythema  NECK: no adenopathy, no asymmetry, masses, or scars and thyroid normal to palpation  RESP: lungs clear to auscultation - no rales, rhonchi or wheezes  CV: regular rates and rhythm, normal S1 S2, no S3 or S4 and no murmur, click or rub  ABDOMEN: soft, nontender, no hepatosplenomegaly, no masses and bowel sounds normal  SKIN: no suspicious lesions or rashes  PSYCH: mentation appears normal, affect normal/bright    Diagnostic Test Results:  Results for orders placed or performed in visit on 08/03/17 (from the past 24 hour(s))   Rapid strep screen   Result Value Ref Range    Specimen Description Throat     Rapid Strep A Screen       NEGATIVE: No Group A streptococcal antigen detected by immunoassay, await   culture report.      Micro Report Status FINAL 08/03/2017        ASSESSMENT/PLAN:     (R50.9) Fever, unspecified  (primary encounter diagnosis)  Comment: afebrile today. Rapid strep  negative. Remainder of exam benign. Given course length, likely viral. Supportive care measures discussed. See patient instructions. If symptoms fail to improve in 1 week, patient should RTC. Sooner if worsening.   Plan: Rapid strep screen, Beta strep group A culture              Follow up: as above     Adams Austin PA-C  [facility]

## 2017-08-04 ENCOUNTER — OFFICE VISIT (OUTPATIENT)
Dept: FAMILY MEDICINE | Facility: CLINIC | Age: 22
End: 2017-08-04
Payer: COMMERCIAL

## 2017-08-04 ENCOUNTER — TELEPHONE (OUTPATIENT)
Dept: FAMILY MEDICINE | Facility: CLINIC | Age: 22
End: 2017-08-04

## 2017-08-04 VITALS
TEMPERATURE: 98.4 F | BODY MASS INDEX: 24.07 KG/M2 | SYSTOLIC BLOOD PRESSURE: 114 MMHG | HEART RATE: 86 BPM | DIASTOLIC BLOOD PRESSURE: 77 MMHG | WEIGHT: 156 LBS

## 2017-08-04 DIAGNOSIS — R21 RASH AND NONSPECIFIC SKIN ERUPTION: Primary | ICD-10-CM

## 2017-08-04 DIAGNOSIS — J06.9 UPPER RESPIRATORY TRACT INFECTION, UNSPECIFIED TYPE: ICD-10-CM

## 2017-08-04 LAB
BACTERIA SPEC CULT: NORMAL
BASOPHILS # BLD AUTO: 0 10E9/L (ref 0–0.2)
BASOPHILS NFR BLD AUTO: 0.4 %
DIFFERENTIAL METHOD BLD: ABNORMAL
EOSINOPHIL # BLD AUTO: 0.5 10E9/L (ref 0–0.7)
EOSINOPHIL NFR BLD AUTO: 6.6 %
ERYTHROCYTE [DISTWIDTH] IN BLOOD BY AUTOMATED COUNT: 16 % (ref 10–15)
HCT VFR BLD AUTO: 39.6 % (ref 35–47)
HETEROPH AB SER QL: NEGATIVE
HGB BLD-MCNC: 12.6 G/DL (ref 11.7–15.7)
LYMPHOCYTES # BLD AUTO: 1.6 10E9/L (ref 0.8–5.3)
LYMPHOCYTES NFR BLD AUTO: 20.3 %
MCH RBC QN AUTO: 26.4 PG (ref 26.5–33)
MCHC RBC AUTO-ENTMCNC: 31.8 G/DL (ref 31.5–36.5)
MCV RBC AUTO: 83 FL (ref 78–100)
MICRO REPORT STATUS: NORMAL
MONOCYTES # BLD AUTO: 0.7 10E9/L (ref 0–1.3)
MONOCYTES NFR BLD AUTO: 8.5 %
NEUTROPHILS # BLD AUTO: 5.2 10E9/L (ref 1.6–8.3)
NEUTROPHILS NFR BLD AUTO: 64.2 %
PLATELET # BLD AUTO: 332 10E9/L (ref 150–450)
RBC # BLD AUTO: 4.78 10E12/L (ref 3.8–5.2)
SPECIMEN SOURCE: NORMAL
WBC # BLD AUTO: 8 10E9/L (ref 4–11)

## 2017-08-04 PROCEDURE — 99000 SPECIMEN HANDLING OFFICE-LAB: CPT | Performed by: PHYSICIAN ASSISTANT

## 2017-08-04 PROCEDURE — 99213 OFFICE O/P EST LOW 20 MIN: CPT | Performed by: PHYSICIAN ASSISTANT

## 2017-08-04 PROCEDURE — 40000956 ZZHCL STATISTIC MEASLES AND RUBELLA VIRUSES PCR: Mod: 90 | Performed by: PHYSICIAN ASSISTANT

## 2017-08-04 PROCEDURE — 86308 HETEROPHILE ANTIBODY SCREEN: CPT | Performed by: PHYSICIAN ASSISTANT

## 2017-08-04 PROCEDURE — 85025 COMPLETE CBC W/AUTO DIFF WBC: CPT | Performed by: PHYSICIAN ASSISTANT

## 2017-08-04 PROCEDURE — 36415 COLL VENOUS BLD VENIPUNCTURE: CPT | Performed by: PHYSICIAN ASSISTANT

## 2017-08-04 ASSESSMENT — PATIENT HEALTH QUESTIONNAIRE - PHQ9: SUM OF ALL RESPONSES TO PHQ QUESTIONS 1-9: 23

## 2017-08-04 NOTE — PROGRESS NOTES
SUBJECTIVE:                                                    Kathi Enriquez is a 21 year old female who presents to clinic today for the following health issues:      Rash  Onset: this arm    Description:   Location: arms, neck, chest, abd/sides-unsure where it started. Awoke with this.   Character: blotchy, redness, raised in the sunlight  Itching (Pruritis): YES-on neck    Progression of Symptoms:  worsening    Accompanying Signs & Symptoms:  Fever: YES- 104  Body aches or joint pain: YES- abd pain  Sore throat symptoms: YES  Recent cold symptoms: YES-HA's, sinus, cough    History:   Previous similar rash: no     Precipitating factors:   Exposure to similar rash: no   New exposures: None   Recent travel: no     Therapies Tried and outcome: robitussin and dayquil    URI symptoms unchanged.       Problem list and histories reviewed & adjusted, as indicated.  Additional history: as documented    Patient Active Problem List   Diagnosis     GERD (gastroesophageal reflux disease)     Anxiety     Major depressive disorder, recurrent episode, moderate (H)     Suicidal ideation     History reviewed. No pertinent surgical history.    Social History   Substance Use Topics     Smoking status: Current Every Day Smoker     Packs/day: 0.25     Types: Cigarettes     Smokeless tobacco: Never Used     Alcohol use Yes     Family History   Problem Relation Age of Onset     CANCER Paternal Grandmother      Lymphoma     CANCER Paternal Grandfather      Pancreatic     DIABETES Father      Type II Diabetes     CANCER Father      Melanoma     CANCER Maternal Grandfather      Brain Tumor, Bladder Cancer     Cancer - colorectal Maternal Grandfather      Asthma Sister      Blood Disease Sister      Blood Clots in the lung         Current Outpatient Prescriptions   Medication Sig Dispense Refill     venlafaxine (EFFEXOR-ER) 225 MG TB24 24 hr tablet daily       ALPRAZolam (XANAX) 0.25 MG tablet 3 times daily as needed       No Known  Allergies  BP Readings from Last 3 Encounters:   08/04/17 114/77   08/03/17 125/80   01/19/17 100/66    Wt Readings from Last 3 Encounters:   08/04/17 156 lb (70.8 kg)   08/03/17 156 lb (70.8 kg)   01/19/17 159 lb 5 oz (72.3 kg)                        Reviewed and updated as needed this visit by clinical staffTobacco  Allergies  Meds  Problems  Med Hx  Surg Hx  Fam Hx  Soc Hx        Reviewed and updated as needed this visit by Provider  Allergies  Meds  Problems         ROS:  Constitutional, HEENT, skin, neuro,  cardiovascular, pulmonary, gi and gu systems are negative, except as otherwise noted.      OBJECTIVE:   /77 (BP Location: Right arm, Patient Position: Chair, Cuff Size: Adult Regular)  Pulse 86  Temp 98.4  F (36.9  C) (Oral)  Wt 156 lb (70.8 kg)  BMI 24.07 kg/m2  Body mass index is 24.07 kg/(m^2).  GENERAL: alert and no distress  EYES: Eyes grossly normal to inspection, PERRL and conjunctivae and sclerae normal  HEENT: nasal mucosa erythematous and edematous. 1+ tonsillar hypertrophy bilaterally with erythema and without excudate.   NECK: no adenopathy, no asymmetry, masses, or scars and thyroid normal to palpation  RESP: lungs clear to auscultation - no rales, rhonchi or wheezes  CV: regular rates and rhythm, normal S1 S2, no S3 or S4 and no murmur, click or rub  ABDOMEN: soft, nontender, no hepatosplenomegaly, no masses and bowel sounds normal  SKIN: diffuse macular papular mildly erythematous rash no torso, bilateral arms, and neck. Face is clear.   PSYCH: mentation appears normal, affect normal/bright    Diagnostic Test Results:  none     ASSESSMENT/PLAN:       Unable to confirm if 2 dose series of measles was obtained. [facility] and our records only show 1. No known exposures and is unsure if traveled to [facility]. However, does work at gas station. Rash is not typical of measles given sparing face as well as currently is asymptomatic, but current outbreaks in individuals who have  previously been vaccinated have presented atypically. MD was consulted and nasopharyngeal pcr was decided on. Will also assess cbc with mono. Just in case for possible cmp drawn. [place]ola is also possible as well. All differentials discussed in detail with patient. Recommending home quarantine until pcr results return. No work on Sunday. Patient is in understanding and agreement with plan.         ICD-10-CM    1. Rash and nonspecific skin eruption R21 Measles Confirmation PCR     CBC with platelets and differential     Mononucleosis screen     JUST IN CASE   2. Upper respiratory tract infection, unspecified type J06.9 Measles Confirmation PCR     CBC with platelets and differential     Mononucleosis screen     JUST IN CASE       Follow up: pending labs.     Adams Austin PA-C  [facility]

## 2017-08-04 NOTE — LETTER
[address] [address]  Phone: [phone]    August 4, 2017        Kathi Enriquez  [address] [address]          To whom it may concern:    RE: Kathi Enriquez    Patient was seen and treated today at our clinic and missed work on 8/6/17 due to illness.     Please contact me for questions or concerns.      Sincerely,        Adams Austin PA-C

## 2017-08-04 NOTE — MR AVS SNAPSHOT
"              After Visit Summary   2017    Kathi Enriquez    MRN: [id number]           Patient Information     Date Of Birth          1995        Visit Information        Provider Department      2017 10:00 AM Adams Austin PA-C [facility]        Today's Diagnoses     Rash and nonspecific skin eruption    -  1    Upper respiratory tract infection, unspecified type           Follow-ups after your visit        Who to contact     If you have questions or need follow up information about today's clinic visit or your schedule please contact [facility] directly at [phone].  Normal or non-critical lab and imaging results will be communicated to you by Fuel ([email or web address])hart, letter or phone within 4 business days after the clinic has received the results. If you do not hear from us within 7 days, please contact the clinic through Fuel ([email or web address])hart or phone. If you have a critical or abnormal lab result, we will notify you by phone as soon as possible.  Submit refill requests through [employer] or call your pharmacy and they will forward the refill request to us. Please allow 3 business days for your refill to be completed.          Additional Information About Your Visit        MyChart Information     [employer]ashion lets you send messages to your doctor, view your test results, renew your prescriptions, schedule appointments and more. To sign up, go to [email or web address] . Click on \"Log in\" on the left side of the screen, which will take you to the Welcome page. Then click on \"Sign up Now\" on the right side of the page.     You will be asked to enter the access code listed below, as well as some personal information. Please follow the directions to create your username and password.     Your access code is: [email or web address]  Expires: 2017  9:20 AM     Your access code will  in 90 days. If you need help or a new code, please call your [facility] or [phone].      "   Care EveryWhere ID     This is your Care EveryWhere ID. This could be used by other organizations to access your [facility] medical records  [id number]        Your Vitals Were     Pulse Temperature BMI (Body Mass Index)             86 98.4  F (36.9  C) (Oral) 24.07 kg/m2          Blood Pressure from Last 3 Encounters:   08/04/17 114/77   08/03/17 125/80   01/19/17 100/66    Weight from Last 3 Encounters:   08/04/17 156 lb (70.8 kg)   08/03/17 156 lb (70.8 kg)   01/19/17 159 lb 5 oz (72.3 kg)              We Performed the Following     CBC with platelets and differential     JUST IN CASE     Measles Confirmation PCR     Mononucleosis screen        Primary Care Provider    Physician No Ref-Primary       No address on file        Equal Access to Services     CYNDI HUGHES : Manuel Haines, waaxda luqadaha, qaybta kaalmada salomónyaheather, leti moralez . So [facility] [phone].    ATENCIÓN: Si habla español, tiene a lynch disposición servicios gratuitos de asistencia lingüística. Llame al [phone].    We comply with applicable federal civil rights laws and [place] laws. We do not discriminate on the basis of race, color, national origin, age, disability sex, sexual orientation or gender identity.            Thank you!     Thank you for choosing [facility]  for your care. Our goal is always to provide you with excellent care. Hearing back from our patients is one way we can continue to improve our services. Please take a few minutes to complete the written survey that you may receive in the mail after your visit with us. Thank you!             Your Updated Medication List - Protect others around you: Learn how to safely use, store and throw away your medicines at www.disposemymeds.org.          This list is accurate as of: 8/4/17 10:37 AM.  Always use your most recent med list.                   Brand Name Dispense Instructions for use Diagnosis    ALPRAZolam 0.25  MG tablet    XANAX     3 times daily as needed        venlafaxine 225 MG Tb24 24 hr tablet    EFFEXOR-ER     daily

## 2017-08-04 NOTE — NURSING NOTE
"  Chief Complaint   Patient presents with     Rash       Initial /77 (BP Location: Right arm, Patient Position: Chair, Cuff Size: Adult Regular)  Pulse 86  Temp 98.4  F (36.9  C) (Oral)  Wt 156 lb (70.8 kg)  BMI 24.07 kg/m2 Estimated body mass index is 24.07 kg/(m^2) as calculated from the following:    Height as of 1/19/17: 5' 7.5\" (1.715 m).    Weight as of this encounter: 156 lb (70.8 kg).  Medication Reconciliation: complete      ELMER Fonseca    "

## 2017-08-04 NOTE — TELEPHONE ENCOUNTER
Please call patient. Her blood tests for infection and mono have returned normal.     Thanks,    Chris Austin, PAC

## 2017-08-07 NOTE — TELEPHONE ENCOUNTER
Please call and let patient know we just heard back on her measles test and this was negative.     -Chris Austin, ALTHEA

## 2017-08-08 LAB
MEASLES CONFIRMATION PCR: NORMAL
MEASLES SPECIMEN TYPE: NORMAL

## 2017-08-08 NOTE — TELEPHONE ENCOUNTER
Patient calling back.  Advised of negative Measles test.  Is feeling better.  Does still have rash but is going down.  Yen Clark RN

## 2017-09-18 LAB — MISCELLANEOUS TEST: NORMAL

## 2017-11-09 LAB
LOCATION PERFORMED: NORMAL
NORMAL RANGE FOR SEND OUTS MISC TEST: NORMAL
RESULT: NORMAL
SEND OUTS MISC TEST CODE: NORMAL
SEND OUTS MISC TEST SPECIMEN: NORMAL
TEST NAME: NORMAL

## 2018-03-29 ENCOUNTER — OFFICE VISIT (OUTPATIENT)
Dept: PEDIATRICS | Facility: CLINIC | Age: 23
End: 2018-03-29
Payer: COMMERCIAL

## 2018-03-29 VITALS
BODY MASS INDEX: 28.79 KG/M2 | OXYGEN SATURATION: 98 % | TEMPERATURE: 98.2 F | HEIGHT: 68 IN | WEIGHT: 190 LBS | DIASTOLIC BLOOD PRESSURE: 70 MMHG | HEART RATE: 96 BPM | SYSTOLIC BLOOD PRESSURE: 110 MMHG

## 2018-03-29 DIAGNOSIS — J06.9 VIRAL URI WITH COUGH: Primary | ICD-10-CM

## 2018-03-29 LAB
DEPRECATED S PYO AG THROAT QL EIA: NORMAL
FLUAV+FLUBV AG SPEC QL: NEGATIVE
FLUAV+FLUBV AG SPEC QL: NEGATIVE
SPECIMEN SOURCE: NORMAL
SPECIMEN SOURCE: NORMAL

## 2018-03-29 PROCEDURE — 87804 INFLUENZA ASSAY W/OPTIC: CPT | Performed by: NURSE PRACTITIONER

## 2018-03-29 PROCEDURE — 99213 OFFICE O/P EST LOW 20 MIN: CPT | Performed by: NURSE PRACTITIONER

## 2018-03-29 PROCEDURE — 87081 CULTURE SCREEN ONLY: CPT | Performed by: NURSE PRACTITIONER

## 2018-03-29 PROCEDURE — 87880 STREP A ASSAY W/OPTIC: CPT | Performed by: NURSE PRACTITIONER

## 2018-03-29 RX ORDER — BENZONATATE 200 MG/1
200 CAPSULE ORAL 3 TIMES DAILY PRN
Qty: 21 CAPSULE | Refills: 0 | Status: SHIPPED | OUTPATIENT
Start: 2018-03-29 | End: 2018-10-01

## 2018-03-29 NOTE — MR AVS SNAPSHOT
"              After Visit Summary   3/29/2018    Kathi Enriquez    MRN: [id number]           Patient Information     Date Of Birth          1995        Visit Information        Provider Department      3/29/2018 3:20 PM Hiwot South APRN CNP [facility]        Today's Diagnoses     Viral URI with cough    -  1       Follow-ups after your visit        Follow-up notes from your care team     Return in about 1 week (around 2018), or if symptoms worsen or fail to improve.      Who to contact     If you have questions or need follow up information about today's clinic visit or your schedule please contact [facility] directly at [phone].  Normal or non-critical lab and imaging results will be communicated to you by MyChart, letter or phone within 4 business days after the clinic has received the results. If you do not hear from us within 7 days, please contact the clinic through MyChart or phone. If you have a critical or abnormal lab result, we will notify you by phone as soon as possible.  Submit refill requests through Movable or call your pharmacy and they will forward the refill request to us. Please allow 3 business days for your refill to be completed.          Additional Information About Your Visit        MyChart Information     Movable lets you send messages to your doctor, view your test results, renew your prescriptions, schedule appointments and more. To sign up, go to [email or web address] . Click on \"Log in\" on the left side of the screen, which will take you to the Welcome page. Then click on \"Sign up Now\" on the right side of the page.     You will be asked to enter the access code listed below, as well as some personal information. Please follow the directions to create your username and password.     Your access code is: [email or web address]  Expires: 2018  5:08 PM     Your access code will  in 90 days. If you need help or a new code, please call " "your [facility] clinic or [phone].        Care EveryWhere ID     This is your Care EveryWhere ID. This could be used by other organizations to access your [facility] medical records  [id number]        Your Vitals Were     Pulse Temperature Height Pulse Oximetry BMI (Body Mass Index)       96 98.2  F (36.8  C) (Oral) 5' 7.5\" (1.715 m) 98% 29.32 kg/m2        Blood Pressure from Last 3 Encounters:   03/29/18 110/70   08/04/17 114/77   08/03/17 125/80    Weight from Last 3 Encounters:   03/29/18 190 lb (86.2 kg)   08/04/17 156 lb (70.8 kg)   08/03/17 156 lb (70.8 kg)              We Performed the Following     Beta strep group A culture     Influenza A/B antigen     Strep, Rapid Screen          Today's Medication Changes          These changes are accurate as of 3/29/18  5:08 PM.  If you have any questions, ask your nurse or doctor.               Start taking these medicines.        Dose/Directions    benzonatate 200 MG capsule   Commonly known as:  TESSALON   Started by:  Hiwot South APRN CNP        Dose:  200 mg   Take 1 capsule (200 mg) by mouth 3 times daily as needed for cough   Quantity:  21 capsule   Refills:  0         Stop taking these medicines if you haven't already. Please contact your care team if you have questions.     venlafaxine 225 MG Tb24 24 hr tablet   Commonly known as:  EFFEXOR-ER   Stopped by:  Hiwot South APRN CNP                Where to get your medicines      These medications were sent to [id number] [id number]  [address], [facility] [address]     Phone:  [phone]     benzonatate 200 MG capsule                Primary Care Provider Fax #    Physician No Ref-Primary [phone]       No address on file        Equal Access to Services     CYNDI HUGHES : Manuel Haines, waaxda luqadaha, qaybta kaalmada adeegyada, leti miramontes. So [facility] [phone].    ATENCIÓN: Si bam scott, " tiene a lynch disposición servicios gratuitos de asistencia lingüística. Titus gonzalez [phone].    We comply with applicable federal civil rights laws and [place] laws. We do not discriminate on the basis of race, color, national origin, age, disability, sex, sexual orientation, or gender identity.            Thank you!     Thank you for choosing [facility]  for your care. Our goal is always to provide you with excellent care. Hearing back from our patients is one way we can continue to improve our services. Please take a few minutes to complete the written survey that you may receive in the mail after your visit with us. Thank you!             Your Updated Medication List - Protect others around you: Learn how to safely use, store and throw away your medicines at www.disposemymeds.org.          This list is accurate as of 3/29/18  5:08 PM.  Always use your most recent med list.                   Brand Name Dispense Instructions for use Diagnosis    ALPRAZolam 0.25 MG tablet    XANAX     3 times daily as needed        benzonatate 200 MG capsule    TESSALON    21 capsule    Take 1 capsule (200 mg) by mouth 3 times daily as needed for cough

## 2018-03-29 NOTE — PROGRESS NOTES
"  SUBJECTIVE:   Kathi Enriquez is a 22 year old female who presents to clinic today for the following health issues:      Acute Illness   Acute illness concerns: cough  Onset: 4 days    Fever: no    Chills/Sweats: YES    Headache (location?): YES    Sinus Pressure:YES    Conjunctivitis:  no    Ear Pain: no    Rhinorrhea: YES    Congestion: YES    Sore Throat: YES     Cough: YES    Wheeze: no    Decreased Appetite: YES    Nausea: YES    Vomiting: YES    Diarrhea:  no    Dysuria/Freq.: no    Fatigue/Achiness: YES    Sick/Strep Exposure: no     Therapies Tried and outcome:     ROS: const/heent/resp/gi/derm otherwise negative     OBJECTIVE:  /70 (Cuff Size: Adult Regular)  Pulse 96  Temp 98.2  F (36.8  C) (Oral)  Ht 5' 7.5\" (1.715 m)  Wt 190 lb (86.2 kg)  SpO2 98%  BMI 29.32 kg/m2  CONSTITUTIONAL: Alert, well-nourished, well-groomed, NAD  RESP: Lungs CTA. No wheeze, rhonchi, rales. Occasional harsh, dry cough.   CV: HRRR S1 S2 No MRG. No peripheral edema  HEENT: Eyes: Conjunctiva pink and moist. Ears: Ear canals unremarkable. TMs pearly gray bilaterally. Bony landmarks and light reflexes intact. No erythema. Nose: Turbinates pink and moist. Throat: OP pink and moist. No tonsillar enlargement or exudates. No postnasal drip.  Neck: No lymphadenopathy or masses. Thyroid smooth, non-tender, and non-enlarged.      ASSESSMENT/PLAN:  (J06.9,  B97.89) Viral URI with cough  (primary encounter diagnosis)  Comment: Sx consistent with viral URI with cough. She does have some associated nausea. No evidence of strep, flu, AOM, wheezing, pneumonia, etc.   Plan: Discussed supportive cares and reasons to return. Discussed reasons to seek care urgently.             Hiwot South, ESTER-CALEB.        "

## 2018-03-30 LAB
BACTERIA SPEC CULT: NORMAL
SPECIMEN SOURCE: NORMAL

## 2018-04-13 ENCOUNTER — TELEPHONE (OUTPATIENT)
Dept: FAMILY MEDICINE | Facility: CLINIC | Age: 23
End: 2018-04-13

## 2018-04-13 NOTE — LETTER
[facility]  [address] [place], [place], [address]  [phone]        2018    Kathi Enriquez                                                                                                                                                       [address] [address]            Dear Kathi,    I would like you to come in for a Chlamydia screen. This is important if you have ever been sexually active. Chlamydia is the most common sexually transmitted infection and because it is often without symptoms, the infection can go untreated. Thankfully, testing and treatment is easy. Chlamydia is diagnosed through a simple urine test (if you have not urinated in the last hour) or vaginal swab. The CDC and the [place] [facility] Department recommend all women who are sexually active and under the age of 26 years old be screened for Chlamydia.    *Three out of four women with Chlamydia have no Chlamydia symptoms.   *Half of men with Chlamydia have no Chlamydia symptoms.     Chlamydia is spread by:  *Vaginal sex    *Oral sex   *Anal sex       *Infected mother to     If left untreated, Chlamydia can:  *Spread to sex partners     *Lead to ectopic (tubal) pregnancy   *Lead to pelvic inflammatory disease    *Lead to infertility in men and women   *Make it easier to transmit or acquire HIV during sex    *Can be passed to  during childbirth and cause serious eye infection or pneumonia   *Can lead to premature delivery and low birth weight    NOTE: A person can be re-infected after treatment if re-exposed    Please make an appointment to see me so we can do this screening test and ensure that your health is protected. For an appointment call:  [facility] [phone]    Sincerely,    [facility]

## 2018-04-13 NOTE — TELEPHONE ENCOUNTER
Panel Management Review      Patient has the following on her problem list: None      Composite cancer screening  Chart review shows that this patient is due/due soon for the following None  Summary:    Patient is due/failing the following:   Chlamydia screening    Action needed:   Patient needs nurse only appointment.    Type of outreach:    Sent letter.    Questions for provider review:    None                                                                                                                                    ELMER Fonseca       Chart routed to Care Team .

## 2018-04-24 ENCOUNTER — OFFICE VISIT (OUTPATIENT)
Dept: PEDIATRICS | Facility: CLINIC | Age: 23
End: 2018-04-24
Payer: COMMERCIAL

## 2018-04-24 VITALS
TEMPERATURE: 97.6 F | OXYGEN SATURATION: 98 % | SYSTOLIC BLOOD PRESSURE: 114 MMHG | HEIGHT: 68 IN | WEIGHT: 186.9 LBS | BODY MASS INDEX: 28.33 KG/M2 | HEART RATE: 84 BPM | DIASTOLIC BLOOD PRESSURE: 66 MMHG

## 2018-04-24 DIAGNOSIS — R45.851 SUICIDAL IDEATION: ICD-10-CM

## 2018-04-24 DIAGNOSIS — Z00.00 HEALTHCARE MAINTENANCE: Primary | ICD-10-CM

## 2018-04-24 DIAGNOSIS — Z30.09 GENERAL COUNSELING FOR PRESCRIPTION OF ORAL CONTRACEPTIVES: ICD-10-CM

## 2018-04-24 DIAGNOSIS — Z11.3 ROUTINE SCREENING FOR STI (SEXUALLY TRANSMITTED INFECTION): ICD-10-CM

## 2018-04-24 PROCEDURE — 87491 CHLMYD TRACH DNA AMP PROBE: CPT | Performed by: NURSE PRACTITIONER

## 2018-04-24 PROCEDURE — 99395 PREV VISIT EST AGE 18-39: CPT | Performed by: NURSE PRACTITIONER

## 2018-04-24 PROCEDURE — 99213 OFFICE O/P EST LOW 20 MIN: CPT | Mod: 25 | Performed by: NURSE PRACTITIONER

## 2018-04-24 PROCEDURE — 87591 N.GONORRHOEAE DNA AMP PROB: CPT | Performed by: NURSE PRACTITIONER

## 2018-04-24 RX ORDER — MISOPROSTOL 200 UG/1
200 TABLET ORAL ONCE
Qty: 1 TABLET | Refills: 0 | Status: SHIPPED | OUTPATIENT
Start: 2018-04-24 | End: 2018-04-24

## 2018-04-24 ASSESSMENT — ENCOUNTER SYMPTOMS
HEARTBURN: 0
SORE THROAT: 0
HEADACHES: 0
HEMATURIA: 0
DYSURIA: 0
DIARRHEA: 0
FREQUENCY: 0
CHILLS: 0
MYALGIAS: 0
HEMATOCHEZIA: 0
NAUSEA: 0
DIZZINESS: 0
COUGH: 0
PARESTHESIAS: 0
NERVOUS/ANXIOUS: 1
ABDOMINAL PAIN: 0
FEVER: 0
ARTHRALGIAS: 0
CONSTIPATION: 0
JOINT SWELLING: 0
EYE PAIN: 0
WEAKNESS: 0
PALPITATIONS: 0
SHORTNESS OF BREATH: 0

## 2018-04-24 ASSESSMENT — PATIENT HEALTH QUESTIONNAIRE - PHQ9
10. IF YOU CHECKED OFF ANY PROBLEMS, HOW DIFFICULT HAVE THESE PROBLEMS MADE IT FOR YOU TO DO YOUR WORK, TAKE CARE OF THINGS AT HOME, OR GET ALONG WITH OTHER PEOPLE: VERY DIFFICULT
SUM OF ALL RESPONSES TO PHQ QUESTIONS 1-9: 24
SUM OF ALL RESPONSES TO PHQ QUESTIONS 1-9: 24

## 2018-04-24 NOTE — Clinical Note
Hi there,  Are you still working at [employer] this week? I know, in general I cannot bill for contraceptive counseling at a physical (which I think is crazy). However, when I do the IUD consult I'm prescribing the patient a med to take prior to the procedure and I'm saving the pt a visit with cyndee. So I billed for that. Is that ok? Thanks!  Hiwot

## 2018-04-24 NOTE — PROGRESS NOTES
SUBJECTIVE:   CC: Kathi Enriquez is an 22 year old woman who presents for preventive health visit.     Physical   Annual:     Getting at least 3 servings of Calcium per day::  Yes    Bi-annual eye exam::  Yes    Dental care twice a year::  NO    Sleep apnea or symptoms of sleep apnea::  Daytime drowsiness    Diet::  Vegetarian/vegan    Frequency of exercise::  None    Taking medications regularly::  Yes    Medication side effects::  None    Additional concerns today::  No            Concerns today: none    -------------------------------------    Today's PHQ-2 Score:   PHQ-2 ( 1999 [employer]) 4/24/2018   Q1: Little interest or pleasure in doing things 3   Q2: Feeling down, depressed or hopeless 3   PHQ-2 Score 6   Q1: Little interest or pleasure in doing things Nearly every day   Q2: Feeling down, depressed or hopeless Nearly every day   PHQ-2 Score 6     Answers for HPI/ROS submitted by the patient on 4/24/2018   PHQ-2 Score: 6  If you checked off any problems, how difficult have these problems made it for you to do your work, take care of things at home, or get along with other people?: Very difficult  PHQ9 TOTAL SCORE: 24    Abuse: Current or Past(Physical, Sexual or Emotional)- No  Do you feel safe in your environment - Yes    Social History   Substance Use Topics     Smoking status: Current Every Day Smoker     Packs/day: 0.25     Types: Cigarettes     Smokeless tobacco: Never Used     Alcohol use Yes     Alcohol Use 4/24/2018   If you drink alcohol do you typically have greater than 3 drinks per day OR greater than 7 drinks per week? No   No flowsheet data found.    Reviewed orders with patient.  Reviewed health maintenance and updated orders accordingly - No  Labs reviewed in EPIC    Mammogram not appropriate for this patient based on age.    Pertinent mammograms are reviewed under the imaging tab.  History of abnormal Pap smear: NO - age 21-29 PAP every 3 years recommended    Reviewed and updated as  "needed this visit by clinical staff  Tobacco  Allergies  Meds  Med Hx  Surg Hx  Fam Hx  Soc Hx        Reviewed and updated as needed this visit by Provider            Review of Systems   Constitutional: Negative for chills and fever.   HENT: Negative for congestion, ear pain, hearing loss and sore throat.    Eyes: Negative for pain and visual disturbance.   Respiratory: Negative for cough and shortness of breath.    Cardiovascular: Negative for chest pain, palpitations and peripheral edema.   Gastrointestinal: Negative for abdominal pain, constipation, diarrhea, heartburn, hematochezia and nausea.   Genitourinary: Negative for dysuria, frequency, genital sores, hematuria, pelvic pain, urgency, vaginal bleeding and vaginal discharge.   Musculoskeletal: Negative for arthralgias, joint swelling and myalgias.   Skin: Negative for rash.   Neurological: Negative for dizziness, weakness, headaches and paresthesias.   Psychiatric/Behavioral: Negative for mood changes. The patient is nervous/anxious.           OBJECTIVE:   /66 (Cuff Size: Adult Regular)  Pulse 84  Temp 97.6  F (36.4  C) (Tympanic)  Ht 5' 7.5\" (1.715 m)  Wt 186 lb 14.4 oz (84.8 kg)  LMP 04/13/2018 (Exact Date)  SpO2 98%  BMI 28.84 kg/m2  Physical Exam  GENERAL: healthy, alert and no distress  EYES: Eyes grossly normal to inspection, PERRL and conjunctivae and sclerae normal  HENT: ear canals and TM's normal, nose and mouth without ulcers or lesions  NECK: no adenopathy, no asymmetry, masses, or scars and thyroid normal to palpation  RESP: lungs clear to auscultation - no rales, rhonchi or wheezes  CV: regular rate and rhythm, normal S1 S2, no S3 or S4, no murmur, click or rub, no peripheral edema and peripheral pulses strong  ABDOMEN: soft, nontender, no hepatosplenomegaly, no masses and bowel sounds normal  MS: no gross musculoskeletal defects noted, no edema  SKIN: no suspicious lesions or rashes  NEURO: Normal strength and tone, " "mentation intact and speech normal  PSYCH: mentation appears normal, affect normal/bright    ASSESSMENT/PLAN:   1. Healthcare maintenance  Well woman    2. Routine screening for STI (sexually transmitted infection)  - DEPRESSION ACTION PLAN (DAP)  - NEISSERIA GONORRHOEA PCR  - CHLAMYDIA TRACHOMATIS PCR    3. Suicidal ideation  Ongoing for the last 10 years. Passive without a plan. Not any worse than usual. Has no plan. Feels confident she would not act on it. Already has crisis resources. Contracted for safety.     4. General counseling for prescription of oral contraceptives  Wants IUD. Discussed r/b/se. Discussed that IUD typically doesn't regulate ovulation or ovarian cysts. No contraindications. STD testing done today.   - misoprostol (CYTOTEC) 200 MCG tablet; Take 1 tablet (200 mcg) by mouth once for 1 dose Take evening before IUD placement  Dispense: 1 tablet; Refill: 0  -Schedule IUD with Kathi Orozco Steer  -Take misoprostol one tab night before IUD  -Take a snack plus ibupfofen 800mg 30 minutes before procedure.         COUNSELING:  Reviewed preventive health counseling, as reflected in patient instructions         reports that she has been smoking Cigarettes.  She has been smoking about 0.25 packs per day. She has never used smokeless tobacco.    Estimated body mass index is 28.84 kg/(m^2) as calculated from the following:    Height as of this encounter: 5' 7.5\" (1.715 m).    Weight as of this encounter: 186 lb 14.4 oz (84.8 kg).   Weight management plan: Discussed healthy diet and exercise guidelines and patient will follow up in 12 months in clinic to re-evaluate.    Counseling Resources:  ATP IV Guidelines  Pooled Cohorts Equation Calculator  Breast Cancer Risk Calculator  FRAX Risk Assessment  ICSI Preventive Guidelines  Dietary Guidelines for Americans, 2010  USDA's MyPlate  ASA Prophylaxis  Lung CA Screening    Hiwot South, DENNY [facility]  "

## 2018-04-24 NOTE — MR AVS SNAPSHOT
After Visit Summary   4/24/2018    Kathi Enriquez    MRN: [id number]           Patient Information     Date Of Birth          1995        Visit Information        Provider Department      4/24/2018 3:20 PM Hiwot South APRN [facility]        Today's Diagnoses     Healthcare maintenance    -  1    Routine screening for STI (sexually transmitted infection)        Suicidal ideation        General counseling for prescription of oral contraceptives          Care Instructions    -Ask therapist about yoga therapy      -Schedule IUD with Kathi Orozco Steer  -Take misoprostol one tab night before IUD  -Take a snack plus ibupfofen 800mg 30 minutes before procedure.       Preventive Health Recommendations  Female Ages 18 to 25     Yearly exam:     See your health care provider every year in order to  o Review health changes.   o Discuss preventive care.    o Review your medicines if your doctor has prescribed any.      You should be tested each year for STDs (sexually transmitted diseases).       After age 20, talk to your provider about how often you should have cholesterol testing.      Starting at age 21, get a Pap test every three years. If you have an abnormal result, your doctor may have you test more often.      If you are at risk for diabetes, you should have a diabetes test (fasting glucose).     Shots:     Get a flu shot each year.     Get a tetanus shot every 10 years.     Consider getting the shot (vaccine) that prevents cervical cancer (Gardasil).    Nutrition:     Eat at least 5 servings of fruits and vegetables each day.    Eat whole-grain bread, whole-wheat pasta and brown rice instead of white grains and rice.    Talk to your provider about Calcium and Vitamin D.     Lifestyle    Exercise at least 150 minutes a week each week (30 minutes a day, 5 days a week). This will help you control your weight and prevent disease.    Limit alcohol to one drink per  "day.    No smoking.     Wear sunscreen to prevent skin cancer.    See your dentist every six months for an exam and cleaning.          Follow-ups after your visit        Who to contact     If you have questions or need follow up information about today's clinic visit or your schedule please contact [facility] directly at [phone].  Normal or non-critical lab and imaging results will be communicated to you by MyChart, letter or phone within 4 business days after the clinic has received the results. If you do not hear from us within 7 days, please contact the clinic through [facility] or phone. If you have a critical or abnormal lab result, we will notify you by phone as soon as possible.  Submit refill requests through [facility] or call your pharmacy and they will forward the refill request to us. Please allow 3 business days for your refill to be completed.          Additional Information About Your Visit        MyChart Information     [facility] lets you send messages to your doctor, view your test results, renew your prescriptions, schedule appointments and more. To sign up, go to [email or web address] . Click on \"Log in\" on the left side of the screen, which will take you to the Welcome page. Then click on \"Sign up Now\" on the right side of the page.     You will be asked to enter the access code listed below, as well as some personal information. Please follow the directions to create your username and password.     Your access code is: [email or web address]  Expires: 2018  5:08 PM     Your access code will  in 90 days. If you need help or a new code, please call your [facility] clinic or [phone].        Care EveryWhere ID     This is your Care EveryWhere ID. This could be used by other organizations to access your [facility] medical records  [id number]        Your Vitals Were     Pulse Temperature Height Last Period Pulse Oximetry BMI (Body Mass Index)    84 97.6  F (36.4  C) (Tympanic) 5' 7.5\" " (1.715 m) 04/13/2018 (Exact Date) 98% 28.84 kg/m2       Blood Pressure from Last 3 Encounters:   04/24/18 114/66   03/29/18 110/70   08/04/17 114/77    Weight from Last 3 Encounters:   04/24/18 186 lb 14.4 oz (84.8 kg)   03/29/18 190 lb (86.2 kg)   08/04/17 156 lb (70.8 kg)              We Performed the Following     CHLAMYDIA TRACHOMATIS PCR     DEPRESSION ACTION PLAN (DAP)     NEISSERIA GONORRHOEA PCR          Today's Medication Changes          These changes are accurate as of 4/24/18  3:59 PM.  If you have any questions, ask your nurse or doctor.               Start taking these medicines.        Dose/Directions    misoprostol 200 MCG tablet   Commonly known as:  CYTOTEC   Used for:  General counseling for prescription of oral contraceptives   Started by:  Hiwot South APRN CNP        Dose:  200 mcg   Take 1 tablet (200 mcg) by mouth once for 1 dose Take evening before IUD placement   Quantity:  1 tablet   Refills:  0            Where to get your medicines      These medications were sent to [id number] [place], [place] - [address]  [address], [facility] [address]     Phone:  [phone]     misoprostol 200 MCG tablet                Primary Care Provider Fax #    Physician No Ref-Primary [phone]       No address on file        Equal Access to Services     CYNDI HUGHES AH: Hadii yvonne marcos hadasho Soomaali, waaxda luqadaha, qaybta kaalmada adesherley, leti miramontes. So [facility] [phone].    ATENCIÓN: Si habla español, tiene a lynch disposición servicios gratuitos de asistencia lingüística. Llame al [phone].    We comply with applicable federal civil rights laws and [place] laws. We do not discriminate on the basis of race, color, national origin, age, disability, sex, sexual orientation, or gender identity.            Thank you!     Thank you for choosing [facility]  for your care. Our goal is always to provide you with excellent care.  Hearing back from our patients is one way we can continue to improve our services. Please take a few minutes to complete the written survey that you may receive in the mail after your visit with us. Thank you!             Your Updated Medication List - Protect others around you: Learn how to safely use, store and throw away your medicines at www.disposemymeds.org.          This list is accurate as of 4/24/18  3:59 PM.  Always use your most recent med list.                   Brand Name Dispense Instructions for use Diagnosis    ALPRAZolam 0.25 MG tablet    XANAX     3 times daily as needed        benzonatate 200 MG capsule    TESSALON    21 capsule    Take 1 capsule (200 mg) by mouth 3 times daily as needed for cough        misoprostol 200 MCG tablet    CYTOTEC    1 tablet    Take 1 tablet (200 mcg) by mouth once for 1 dose Take evening before IUD placement    General counseling for prescription of oral contraceptives

## 2018-04-24 NOTE — PATIENT INSTRUCTIONS
-Ask therapist about yoga therapy      -Schedule IUD with Kathi Pam Yousifsondra  -Take misoprostol one tab night before IUD  -Take a snack plus ibupfofen 800mg 30 minutes before procedure.       Preventive Health Recommendations  Female Ages 18 to 25     Yearly exam:     See your health care provider every year in order to  o Review health changes.   o Discuss preventive care.    o Review your medicines if your doctor has prescribed any.      You should be tested each year for STDs (sexually transmitted diseases).       After age 20, talk to your provider about how often you should have cholesterol testing.      Starting at age 21, get a Pap test every three years. If you have an abnormal result, your doctor may have you test more often.      If you are at risk for diabetes, you should have a diabetes test (fasting glucose).     Shots:     Get a flu shot each year.     Get a tetanus shot every 10 years.     Consider getting the shot (vaccine) that prevents cervical cancer (Gardasil).    Nutrition:     Eat at least 5 servings of fruits and vegetables each day.    Eat whole-grain bread, whole-wheat pasta and brown rice instead of white grains and rice.    Talk to your provider about Calcium and Vitamin D.     Lifestyle    Exercise at least 150 minutes a week each week (30 minutes a day, 5 days a week). This will help you control your weight and prevent disease.    Limit alcohol to one drink per day.    No smoking.     Wear sunscreen to prevent skin cancer.    See your dentist every six months for an exam and cleaning.

## 2018-04-24 NOTE — LETTER
My Depression Action Plan  Name: Kathi Enriquez   Date of Birth 1995  Date: 4/24/2018    My doctor: No Ref-Primary, Physician   My clinic: [facility]  [facility]  [address]  [place] [address] [phone]          GREEN    ZONE   Good Control    What it looks like:     Things are going generally well. You have normal up s and down s. You may even feel depressed from time to time, but bad moods usually last less than a day.   What you need to do:  1. Continue to care for yourself (see self care plan)  2. Check your depression survival kit and update it as needed  3. Follow your physician s recommendations including any medication.  4. Do not stop taking medication unless you consult with your physician first.           YELLOW         ZONE Getting Worse    What it looks like:     Depression is starting to interfere with your life.     It may be hard to get out of bed; you may be starting to isolate yourself from others.    Symptoms of depression are starting to last most all day and this has happened for several days.     You may have suicidal thoughts but they are not constant.   What you need to do:     1. Call your care team, your response to treatment will improve if you keep your care team informed of your progress. Yellow periods are signs an adjustment may need to be made.     2. Continue your self-care, even if you have to fake it!    3. Talk to someone in your support network    4. Open up your depression survival kit           RED    ZONE Medical Alert - Get Help    What it looks like:     Depression is seriously interfering with your life.     You may experience these or other symptoms: You can t get out of bed most days, can t work or engage in other necessary activities, you have trouble taking care of basic hygiene, or basic responsibilities, thoughts of suicide or death that will not go away, self-injurious behavior.     What you need to  do:  1. Call your care team and request a same-day appointment. If they are not available (weekends or after hours) call your local crisis line, emergency room or 911.            Depression Self Care Plan / Survival Kit    Self-Care for Depression  Here s the deal. Your body and mind are really not as separate as most people think.  What you do and think affects how you feel and how you feel influences what you do and think. This means if you do things that people who feel good do, it will help you feel better.  Sometimes this is all it takes.  There is also a place for medication and therapy depending on how severe your depression is, so be sure to consult with your medical provider and/ or Behavioral Health Consultant if your symptoms are worsening or not improving.     In order to better manage my stress, I will:    Exercise  Get some form of exercise, every day. This will help reduce pain and release endorphins, the  feel good  chemicals in your brain. This is almost as good as taking antidepressants!  This is not the same as joining a gym and then never going! (they count on that by the way ) It can be as simple as just going for a walk or doing some gardening, anything that will get you moving.      Hygiene   Maintain good hygiene (Get out of bed in the morning, Make your bed, Brush your teeth, Take a shower, and Get dressed like you were going to work, even if you are unemployed).  If your clothes don't fit try to get ones that do.    Diet  I will strive to eat foods that are good for me, drink plenty of water, and avoid excessive sugar, caffeine, alcohol, and other mood-altering substances.  Some foods that are helpful in depression are: complex carbohydrates, B vitamins, flaxseed, fish or fish oil, fresh fruits and vegetables.    Psychotherapy  I agree to participate in Individual Therapy (if recommended).    Medication  If prescribed medications, I agree to take them.  Missing doses can result in serious  side effects.  I understand that drinking alcohol, or other illicit drug use, may cause potential side effects.  I will not stop my medication abruptly without first discussing it with my provider.    Staying Connected With Others  I will stay in touch with my friends, family members, and my primary care provider/team.    Use your imagination  Be creative.  We all have a creative side; it doesn t matter if it s oil painting, sand castles, or mud pies! This will also kick up the endorphins.    Witness Beauty  (AKA stop and smell the roses) Take a look outside, even in mid-winter. Notice colors, textures. Watch the squirrels and birds.     Service to others  Be of service to others.  There is always someone else in need.  By helping others we can  get out of ourselves  and remember the really important things.  This also provides opportunities for practicing all the other parts of the program.    Humor  Laugh and be silly!  Adjust your TV habits for less news and crime-drama and more comedy.    Control your stress  Try breathing deep, massage therapy, biofeedback, and meditation. Find time to relax each day.     My support system    Clinic Contact:  Phone number:    Contact 1:  Phone number:    Contact 2:  Phone number:    Amish/:  Phone number:    Therapist:  Phone number:    Local crisis center:    Phone number:    Other community support:  Phone number:

## 2018-04-25 ASSESSMENT — PATIENT HEALTH QUESTIONNAIRE - PHQ9: SUM OF ALL RESPONSES TO PHQ QUESTIONS 1-9: 24

## 2018-04-27 ENCOUNTER — NURSE TRIAGE (OUTPATIENT)
Dept: NURSING | Facility: CLINIC | Age: 23
End: 2018-04-27

## 2018-04-27 LAB
C TRACH DNA SPEC QL NAA+PROBE: NEGATIVE
N GONORRHOEA DNA SPEC QL NAA+PROBE: NEGATIVE
SPECIMEN SOURCE: NORMAL
SPECIMEN SOURCE: NORMAL

## 2018-04-28 NOTE — TELEPHONE ENCOUNTER
Additional Information    Negative: Lab result questions    Negative: [1] Caller is not with the adult (patient) AND [2] reporting urgent symptoms    Negative: Medication questions    Negative: Caller cannot be reached by phone    Negative: Caller has already spoken to PCP or another triager    Negative: RN needs further essential information from caller in order to complete triage    Negative: Requesting regular office appointment    Negative: [1] Caller requesting NON-URGENT health information AND [2] PCP's office is the best resource    Negative: Health Information question, no triage required and triager able to answer question    Negative: General information question, no triage required and triager able to answer question    Negative: Question about upcoming scheduled test, no triage required and triager able to answer question    Negative: [1] Caller is not with the adult (patient) AND [2] probable NON-URGENT symptoms    [1] Follow-up call to recent contact AND [2] information only call, no triage required    Protocols used: INFORMATION ONLY CALL-ADULT-    Outbound call: Pt. Calls and wants to know her test results, from Tuesday, 4/24/18. RN then checked EPIC and because the  Has reviewed the results, this nurse told pt., the results and the note that the  charted. Pt. Voiced understanding .

## 2018-04-28 NOTE — TELEPHONE ENCOUNTER
----- Message from Aman Dickson sent at 4/27/2018  7:26 PM CDT -----  Reason for Call:  Request for results:    Name of test or procedure: NEISSERIA GONORRHOEA PCR, CHLAMYDIA TRACHOMATIS PCR     Date of test of procedure: 04/24/2018    Location of the test or procedure: [facility]    OK to leave the result message on voice mail or with a family member? YES    Phone number Patient can be reached at:  Home number on file [phone] (home)    Additional comments: Calling back for results. Thank you.    Call taken on 4/27/2018 at 7:24 PM by Aman Dickson

## 2018-05-26 ENCOUNTER — APPOINTMENT (OUTPATIENT)
Dept: ULTRASOUND IMAGING | Facility: CLINIC | Age: 23
End: 2018-05-26
Attending: EMERGENCY MEDICINE
Payer: COMMERCIAL

## 2018-05-26 ENCOUNTER — NURSE TRIAGE (OUTPATIENT)
Dept: NURSING | Facility: CLINIC | Age: 23
End: 2018-05-26

## 2018-05-26 ENCOUNTER — HOSPITAL ENCOUNTER (EMERGENCY)
Facility: CLINIC | Age: 23
Discharge: HOME | End: 2018-05-26
Attending: EMERGENCY MEDICINE | Admitting: EMERGENCY MEDICINE
Payer: COMMERCIAL

## 2018-05-26 VITALS
TEMPERATURE: 97.7 F | BODY MASS INDEX: 29.03 KG/M2 | OXYGEN SATURATION: 97 % | HEART RATE: 68 BPM | RESPIRATION RATE: 16 BRPM | WEIGHT: 185 LBS | DIASTOLIC BLOOD PRESSURE: 79 MMHG | SYSTOLIC BLOOD PRESSURE: 104 MMHG | HEIGHT: 67 IN

## 2018-05-26 DIAGNOSIS — N39.0 URINARY TRACT INFECTION IN FEMALE: Primary | ICD-10-CM

## 2018-05-26 LAB
ALBUMIN UR-MCNC: NEGATIVE MG/DL
ANION GAP SERPL CALCULATED.3IONS-SCNC: 6 MMOL/L (ref 3–14)
APPEARANCE UR: ABNORMAL
BACTERIA #/AREA URNS HPF: ABNORMAL /HPF
BASOPHILS # BLD AUTO: 0 10E9/L (ref 0–0.2)
BASOPHILS NFR BLD AUTO: 0.3 %
BILIRUB UR QL STRIP: NEGATIVE
BUN SERPL-MCNC: 5 MG/DL (ref 7–30)
CALCIUM SERPL-MCNC: 8.9 MG/DL (ref 8.5–10.1)
CHLORIDE SERPL-SCNC: 109 MMOL/L (ref 94–109)
CO2 SERPL-SCNC: 25 MMOL/L (ref 20–32)
COLOR UR AUTO: ABNORMAL
CREAT SERPL-MCNC: 0.67 MG/DL (ref 0.52–1.04)
DIFFERENTIAL METHOD BLD: ABNORMAL
EOSINOPHIL # BLD AUTO: 0.1 10E9/L (ref 0–0.7)
EOSINOPHIL NFR BLD AUTO: 1.2 %
ERYTHROCYTE [DISTWIDTH] IN BLOOD BY AUTOMATED COUNT: 16 % (ref 10–15)
GFR SERPL CREATININE-BSD FRML MDRD: >90 ML/MIN/1.7M2
GLUCOSE SERPL-MCNC: 86 MG/DL (ref 70–99)
GLUCOSE UR STRIP-MCNC: NEGATIVE MG/DL
HCG UR QL: NEGATIVE
HCT VFR BLD AUTO: 37.1 % (ref 35–47)
HGB BLD-MCNC: 11.4 G/DL (ref 11.7–15.7)
HGB UR QL STRIP: ABNORMAL
HYALINE CASTS #/AREA URNS LPF: 1 /LPF (ref 0–2)
IMM GRANULOCYTES # BLD: 0 10E9/L (ref 0–0.4)
IMM GRANULOCYTES NFR BLD: 0.3 %
KETONES UR STRIP-MCNC: NEGATIVE MG/DL
LEUKOCYTE ESTERASE UR QL STRIP: ABNORMAL
LYMPHOCYTES # BLD AUTO: 1.1 10E9/L (ref 0.8–5.3)
LYMPHOCYTES NFR BLD AUTO: 9.6 %
MCH RBC QN AUTO: 24.2 PG (ref 26.5–33)
MCHC RBC AUTO-ENTMCNC: 30.7 G/DL (ref 31.5–36.5)
MCV RBC AUTO: 79 FL (ref 78–100)
MONOCYTES # BLD AUTO: 0.9 10E9/L (ref 0–1.3)
MONOCYTES NFR BLD AUTO: 8.2 %
NEUTROPHILS # BLD AUTO: 9 10E9/L (ref 1.6–8.3)
NEUTROPHILS NFR BLD AUTO: 80.4 %
NITRATE UR QL: NEGATIVE
NRBC # BLD AUTO: 0 10*3/UL
NRBC BLD AUTO-RTO: 0 /100
PH UR STRIP: 8 PH (ref 5–7)
PLATELET # BLD AUTO: 318 10E9/L (ref 150–450)
POTASSIUM SERPL-SCNC: 3.5 MMOL/L (ref 3.4–5.3)
RBC # BLD AUTO: 4.72 10E12/L (ref 3.8–5.2)
RBC #/AREA URNS AUTO: 2 /HPF (ref 0–2)
SODIUM SERPL-SCNC: 140 MMOL/L (ref 133–144)
SOURCE: ABNORMAL
SP GR UR STRIP: 1 (ref 1–1.03)
SPECIMEN SOURCE: NORMAL
SQUAMOUS #/AREA URNS AUTO: <1 /HPF (ref 0–1)
UROBILINOGEN UR STRIP-MCNC: 0 MG/DL (ref 0–2)
WBC # BLD AUTO: 11.2 10E9/L (ref 4–11)
WBC #/AREA URNS AUTO: 52 /HPF (ref 0–5)
WET PREP SPEC: NORMAL

## 2018-05-26 PROCEDURE — 87491 CHLMYD TRACH DNA AMP PROBE: CPT | Performed by: EMERGENCY MEDICINE

## 2018-05-26 PROCEDURE — 87591 N.GONORRHOEAE DNA AMP PROB: CPT | Performed by: EMERGENCY MEDICINE

## 2018-05-26 PROCEDURE — 87088 URINE BACTERIA CULTURE: CPT | Performed by: EMERGENCY MEDICINE

## 2018-05-26 PROCEDURE — 87186 SC STD MICRODIL/AGAR DIL: CPT | Performed by: EMERGENCY MEDICINE

## 2018-05-26 PROCEDURE — 87210 SMEAR WET MOUNT SALINE/INK: CPT | Performed by: EMERGENCY MEDICINE

## 2018-05-26 PROCEDURE — 80048 BASIC METABOLIC PNL TOTAL CA: CPT | Performed by: EMERGENCY MEDICINE

## 2018-05-26 PROCEDURE — 93976 VASCULAR STUDY: CPT

## 2018-05-26 PROCEDURE — 81025 URINE PREGNANCY TEST: CPT | Performed by: EMERGENCY MEDICINE

## 2018-05-26 PROCEDURE — 85025 COMPLETE CBC W/AUTO DIFF WBC: CPT | Performed by: EMERGENCY MEDICINE

## 2018-05-26 PROCEDURE — 81001 URINALYSIS AUTO W/SCOPE: CPT | Performed by: EMERGENCY MEDICINE

## 2018-05-26 PROCEDURE — 99284 EMERGENCY DEPT VISIT MOD MDM: CPT | Mod: 25

## 2018-05-26 PROCEDURE — 87086 URINE CULTURE/COLONY COUNT: CPT | Performed by: EMERGENCY MEDICINE

## 2018-05-26 PROCEDURE — 36415 COLL VENOUS BLD VENIPUNCTURE: CPT | Performed by: EMERGENCY MEDICINE

## 2018-05-26 RX ORDER — NITROFURANTOIN 25; 75 MG/1; MG/1
100 CAPSULE ORAL 2 TIMES DAILY
Qty: 6 CAPSULE | Refills: 0 | Status: SHIPPED | OUTPATIENT
Start: 2018-05-26 | End: 2018-10-01

## 2018-05-26 ASSESSMENT — ENCOUNTER SYMPTOMS
FEVER: 1
ABDOMINAL PAIN: 1
HEMATURIA: 1

## 2018-05-26 NOTE — ED AVS SNAPSHOT
[facility] Emergency Department    [address]    [facility] [address]    Phone:  [phone]    Fax:  [phone]                                       Kathi Enriquez   MRN: [id number]    Department:  [facility] Emergency Department   Date of Visit:  5/26/2018           After Visit Summary Signature Page     I have received my discharge instructions, and my questions have been answered. I have discussed any challenges I see with this plan with the nurse or doctor.    ..........................................................................................................................................  Patient/Patient Representative Signature      ..........................................................................................................................................  Patient Representative Print Name and Relationship to Patient    ..................................................               ................................................  Date                                            Time    ..........................................................................................................................................  Reviewed by Signature/Title    ...................................................              ..............................................  Date                                                            Time

## 2018-05-26 NOTE — ED NOTES
I have performed an in person assessment of the patient. Based on this assessment the patient no longer requires a one on one attendant at this point in time.    Robby Yuen MD  10:13 AM  May 26, 2018           Robby Yuen MD  05/26/18 1013

## 2018-05-26 NOTE — ED PROVIDER NOTES
"  History   Chief Complaint:  Hematuria and Dysuria    HPI   Kathi Enriquez is a 22 year old female who presents to the emergency department today with hematuria and dysuria. The patient presents with sharp, constant lower abdominal pain that feels like stabbing, intermittent vaginal pain, and hematuria that started this morning at 0500. She rates her pain as 4/10. Last time she had intercourse was last week. Patient states she had a fever of 101 this morning, but took 800 mg ibuprofen at 0700. She is not on her period, and normally has normal periods. She denies history of belly surgery. She does have a history of ovarian cysts. She has a history of UTI, but this feels different. She denies back pain.     Of note, the patient did mention some depression and suicidal ideation history, but reports she does not have a plan and is being treated for depression at this time.     Allergies:  No Known Drug Allergies     Medications:    Xanax  Tessalon    Past Medical History:    Anxiety  Deliberate self cutting  Insomnia   Depression  Panic  Suicidal ideation    Past Surgical History:    History reviewed. No pertinent past surgical history.    Family History:    Cancer  Diabetes  Asthma  Blood disease    Social History:  The patient was alone.  Smoking Status: Current every day  Smokeless Tobacco: Never  Alcohol Use: Yes   Marital Status:  Single    Review of Systems   Constitutional: Positive for fever.   Gastrointestinal: Positive for abdominal pain.   Genitourinary: Positive for hematuria and vaginal pain. Negative for vaginal bleeding.   All other systems reviewed and are negative.    Physical Exam     Patient Vitals for the past 24 hrs:   BP Temp Temp src Pulse Heart Rate Resp SpO2 Height Weight   05/26/18 0915 124/83 - - - - - - - -   05/26/18 0914 124/83 97.7  F (36.5  C) Oral 86 86 16 98 % 1.702 m (5' 7\") 83.9 kg (185 lb)      Physical Exam  General: Alert, appears well-developed and well-nourished. Cooperative. "     In no distress  Head:  Atraumatic  Ears:  External ears are normal  Mouth/Throat:  Oropharynx is without erythema or exudate and mucous membranes are moist.   Eyes:   Conjunctivae normal and EOM are normal. No scleral icterus.  CV:  Normal rate, regular rhythm, normal heart sounds and radial pulses are 2+ and symmetric.  No murmur.  Resp:  Breath sounds are clear bilaterally    Non-labored, no retractions or accessory muscle use  GI:  Abdomen is soft, no distension, no tenderness. No rebound or guarding.  No CVA tenderness bilaterally  Pelvic:  White vaginal discharge, no vaginal bleeding, no CMT, exam preformed in presence of female nurse.  MS:  Normal range of motion. No edema.    Normal strength in all 4 extremities.     Back atraumatic.    No midline cervical, thoracic, or lumbar tenderness  Skin:  Warm and dry.  No rash or lesions noted.  Neuro:  Alert. Normal strength.  GCS: 15  Psych: Normal mood and affect. No SI/HI.  Denies hallucinations.      Emergency Department Course   Imaging:  Radiology findings were communicated with the patient who voiced understanding of the findings.  US Pelvic Transvaginal and Doppler Limited Pelvis Duplex  IMPRESSION: Normal pelvic ultrasound.  Report per radiology      Laboratory:  Laboratory findings were communicated with the patient who voiced understanding of the findings.  BMP: 5(L) BUN o/w WNL (Creatinine 0.67)  CBC: AWNL (WBC 11.2(H), HGB 11.4(L), )  UA: slightly cloudy, straw urine with 1.001(L) Specific gravity, moderate blood, 8.0(H) pH, large Leukocyte esterase, 52 WBC, moderate bacteria  o/w WNL   Urine Culture Aerobic Bacterial: Pending   Chlamydia trachomatis PCR: Pending   Neisseria gonorrhea PCR: Pending   Wet Prep: Normal    Emergency Department Course:  Nursing notes and vitals reviewed.  0925: I performed an exam of the patient as documented above.   IV was inserted and blood was drawn for laboratory testing, results above.  The patient provided a  urine sample here in the emergency department. This was sent for laboratory testing, findings above.  The patient was sent for a US Pelvic Transvaginal and Doppler Limited Pelvis Duplex while in the emergency department, results above.   1011: Patient rechecked and updated.    I personally reviewed the laboratory and imaging results with the Patient and answered all related questions prior to discharge.     Impression & Plan    Medical Decision Making:  Kathi Enriquez is a 22 year old female who presents for evaluation of hematuria, abdominal, and vaginal pain.  This clinically is consistent with a urinary tract infection.  Urinalysis confirms the infection.  There has been no fever, back/flank pain or significant abdominal pain.  There is no clinical evidence of pyelonephritis, appendicitis, colitis, diverticulitis or any intraabdominal catastrophe. US shows no concerning pelvic pathology. Wet prep negative.  GC/CH pending at time of discharge.  UC pending at time of discharge. The patient will be started on antibiotics for the infection. Return if increasing pain, vomiting, fever, or inability to tolerate the oral antibiotic.  Follow up with primary physician is indicated if not improving in 2-3 days.      Diagnosis:    ICD-10-CM    1. Urinary tract infection in female N39.0 Wet prep     Chlamydia trachomatis PCR     Neisseria gonorrhoeae PCR     Basic metabolic panel     CBC with platelets differential     Urine Culture     Disposition:  discharged to home    Discharge Medications:  New Prescriptions    NITROFURANTOIN, MACROCRYSTAL-MONOHYDRATE, (MACROBID) 100 MG CAPSULE    Take 1 capsule (100 mg) by mouth 2 times daily       Scribe Disclosure:  Kacey LARA, am serving as a scribe at 9:22 AM on 5/26/2018 to document services personally performed by Robby Yuen MD based on my observations and the provider's statements to me.    5/26/2018   [facility] EMERGENCY DEPARTMENT       Alpa  MD Robby  05/26/18 2948

## 2018-05-26 NOTE — ED AVS SNAPSHOT
[facility] Emergency Department    [address]    [facility] [address]    Phone:  [phone]    Fax:  [phone]                                       Kathi Enriquez   MRN: [id number]    Department:  [facility] Emergency Department   Date of Visit:  5/26/2018           Patient Information     Date Of Birth          1995        Your diagnoses for this visit were:     Urinary tract infection in female        You were seen by Robby Yuen MD.      Follow-up Information     Schedule an appointment as soon as possible for a visit with your primary care provider.        Follow up with [facility] Emergency Department.    Specialty:  EMERGENCY MEDICINE    Why:  If symptoms worsen    Contact information:    [address]  [facility] [address] [phone]        Discharge Instructions       Discharge Instructions  Urinary Tract Infection  You or your child have been diagnosed with a urinary tract infection, or UTI. The urinary tract includes the kidneys (which make urine/pee), ureters (the tubes that carry urine/pee from the kidneys to the bladder), the bladder (which stores urine/pee), and urethra (the tube that carries urine/pee out of the bladder). Urinary tract infections occur when bacteria travel up the urethra into the bladder (bladder infection) and, in some cases, from there into the kidneys (kidney infection).  Generally, every Emergency Department visit should have a follow-up clinic visit with either a primary or a specialty clinic/provider. Please follow-up as instructed by your emergency provider today.  Return to the Emergency Department if:    You or your child have severe back pain.    You or your child are vomiting (throwing up) so that you cannot take your medicine.    You or your child have a new fever (had not previously had a fever) over 101 F.    You or your child have confusion or are very weak, or feel very  ill.    Your child seems much more ill, will not wake up, will not respond right, or is crying for a long time and will not calm down.    You or your child are showing signs of dehydration. These signs may include decreased urination (pee), dry mouth/gums/tongue, or decreased activity.    Follow-up with your provider:     Children under 24 months need to be seen by their regular provider within one week after a diagnosis of a UTI. It may be necessary to do some more tests to look at the child s kidney or bladder.    You should begin to feel better within 24 - 48 hours of starting your antibiotic; follow-up with your regular clinic/doctor/provider if this is not the case.    Treatment:     You will be treated with an antibiotic to kill the bacteria. We have to make an educated guess, based on what we know about common bacteria and antibiotics, as to which antibiotic will work for your infection. We will be correct most times but there will be some cases where the antibiotic chosen is not correct (see urine cultures below).    Take a pain medication such as acetaminophen (Tylenol ) or ibuprofen (Advil , Motrin , Nuprin ).    Phenazopyridine (Pyridium , Uristat ) is a prescription medication that numbs the bladder to reduce the burning pain of some UTIs.  The same medication is available in a non-prescription version (Azo-Standard , Urodol ). This medication will change the color of the urine and tears (usually blue or orange). If you wear contacts, do not wear them while taking this medication as they may be stained by the medication.    Urine Cultures:    If indicated, a urine culture may have been performed today. This test generally takes 24-48 hours to complete so the results are not known at this time. The results can confirm that an infection is present but also determine which antibiotic is effective for the specific bacteria that is causing the infection. If your urine culture shows that the antibiotic you  "were given today will not work to treat your infection, we will attempt to contact you to make arrangements to change the antibiotic. If the culture confirms that the antibiotic is effective for your infection, you will not be contacted. We often recommend follow-up with your regular physician/provider on the culture results regardless of this process.    Antibiotic Warning:     If you have been placed on antibiotics - watch for signs of allergic reaction.  These include rash, lip swelling, difficulty breathing, wheezing, and dizziness.  If you develop any of these symptoms, stop the antibiotic immediately and go to an emergency room or urgent care for evaluation.    Probiotics: If you have been given an antibiotic, you may want to also take a probiotic pill or eat yogurt with live cultures. Probiotics have \"good bacteria\" to help your intestines stay healthy. Studies have shown that probiotics help prevent diarrhea and other intestine problems (including C. diff infection) when you take antibiotics. You can buy these without a prescription in the pharmacy section of the store.   If you were given a prescription for medicine here today, be sure to read all of the information (including the package insert) that comes with your prescription.  This will include important information about the medicine, its side effects, and any warnings that you need to know about.  The pharmacist who fills the prescription can provide more information and answer questions you may have about the medicine.  If you have questions or concerns that the pharmacist cannot address, please call or return to the Emergency Department.   Remember that you can always come back to the Emergency Department if you are not able to see your regular provider in the amount of time listed above, if you get any new symptoms, or if there is anything that worries you.      24 Hour Appointment Hotline       To make an appointment at any [facility], call " [phone] ([phone]). If you don't have a family doctor or clinic, we will help you find one. [place] clinics are conveniently located to serve the needs of you and your family.             Review of your medicines      START taking        Dose / Directions Last dose taken    nitroFURantoin (macrocrystal-monohydrate) 100 MG capsule   Commonly known as:  MACROBID   Dose:  100 mg   Quantity:  6 capsule        Take 1 capsule (100 mg) by mouth 2 times daily   Refills:  0          Our records show that you are taking the medicines listed below. If these are incorrect, please call your family doctor or clinic.        Dose / Directions Last dose taken    ALPRAZolam 0.25 MG tablet   Commonly known as:  XANAX        3 times daily as needed   Refills:  0        benzonatate 200 MG capsule   Commonly known as:  TESSALON   Dose:  200 mg   Quantity:  21 capsule        Take 1 capsule (200 mg) by mouth 3 times daily as needed for cough   Refills:  0                Prescriptions were sent or printed at these locations (1 Prescription)                   Other Prescriptions                Printed at Department/Unit printer (1 of 1)         nitroFURantoin, macrocrystal-monohydrate, (MACROBID) 100 MG capsule                Procedures and tests performed during your visit     Basic metabolic panel    CBC with platelets differential    Chlamydia trachomatis PCR    Discontinue 1:1 attendant for suicide risk    HCG qualitative urine (UPT)    Neisseria gonorrhoeae PCR    UA with Microscopic    US Pelvis Cmplt w Transvag & Doppler LmtPel Duplex Limited    Urine Culture    Wet prep      Orders Needing Specimen Collection     None      Pending Results     Date and Time Order Name Status Description    5/26/2018 0944 Urine Culture In process     5/26/2018 0930 US Pelvis Cmplt w Transvag & Doppler LmtPel Duplex Limited Preliminary     5/26/2018 0930 Neisseria gonorrhoeae PCR In process     5/26/2018 0930 Chlamydia trachomatis PCR In  process             Pending Culture Results     Date and Time Order Name Status Description    5/26/2018 0944 Urine Culture In process     5/26/2018 0930 Neisseria gonorrhoeae PCR In process     5/26/2018 0930 Chlamydia trachomatis PCR In process             Pending Results Instructions     If you had any lab results that were not finalized at the time of your Discharge, you can call the ED Lab Result RN at [phone]. You will be contacted by this team for any positive Lab results or changes in treatment. The nurses are available 7 days a week from 10A to 6:30P.  You can leave a message 24 hours per day and they will return your call.        Test Results From Your Hospital Stay        5/26/2018  9:36 AM      Component Results     Component Value Ref Range & Units Status    Color Urine Straw  Final    Appearance Urine Slightly Cloudy  Final    Glucose Urine Negative NEG^Negative mg/dL Final    Bilirubin Urine Negative NEG^Negative Final    Ketones Urine Negative NEG^Negative mg/dL Final    Specific Gravity Urine 1.001 (L) 1.003 - 1.035 Final    Blood Urine Moderate (A) NEG^Negative Final    pH Urine 8.0 (H) 5.0 - 7.0 pH Final    Protein Albumin Urine Negative NEG^Negative mg/dL Final    Urobilinogen mg/dL 0.0 0.0 - 2.0 mg/dL Final    Nitrite Urine Negative NEG^Negative Final    Leukocyte Esterase Urine Large (A) NEG^Negative Final    Source Midstream Urine  Final    WBC Urine 52 (H) 0 - 5 /HPF Final    RBC Urine 2 0 - 2 /HPF Final    Bacteria Urine Moderate (A) NEG^Negative /HPF Final    Squamous Epithelial /HPF Urine <1 0 - 1 /HPF Final    Hyaline Casts 1 0 - 2 /LPF Final         5/26/2018  9:36 AM      Component Results     Component Value Ref Range & Units Status    HCG Qual Urine Negative NEG^Negative Final    This test is for screening purposes.  Results should be interpreted along with   the clinical picture.  Confirmation testing is available if warranted by   ordering [id number], HCG Quantitative Pregnancy.            5/26/2018 10:51 AM      Component Results     Component    Specimen Description    Vagina    Wet Prep    No Trichomonas seen    Wet Prep    No clue cells seen    Wet Prep    No yeast seen    Wet Prep    Few  PMNs seen           5/26/2018 10:41 AM         5/26/2018 10:41 AM         5/26/2018 11:34 AM      Narrative     PELVIC ULTRASOUND 5/26/2018 11:00 AM    HISTORY: Pelvic pain, greatest on the left side.    FINDINGS: Transabdominal imaging was supplemented by endovaginal  imaging for better evaluation of the uterus and ovaries. The uterus  measures 6.9 x 4.2 x 3.5 cm and is normal in appearance. The  endometrial stripe measures 8 mm. Both ovaries are normal in size and  appearance. Doppler waveform analysis demonstrates normal arterial and  venous blood flow in both ovaries. No adnexal masses are seen. No free  fluid in the pelvis.        Impression     IMPRESSION: Normal pelvic ultrasound.         5/26/2018 10:29 AM      Component Results     Component Value Ref Range & Units Status    Sodium 140 133 - 144 mmol/L Final    Potassium 3.5 3.4 - 5.3 mmol/L Final    Chloride 109 94 - 109 mmol/L Final    Carbon Dioxide 25 20 - 32 mmol/L Final    Anion Gap 6 3 - 14 mmol/L Final    Glucose 86 70 - 99 mg/dL Final    Urea Nitrogen 5 (L) 7 - 30 mg/dL Final    Creatinine 0.67 0.52 - 1.04 mg/dL Final    GFR Estimate >90 >60 mL/min/1.7m2 Final    Non  GFR Calc    GFR Estimate If Black >90 >60 mL/min/1.7m2 Final    African American GFR Calc    Calcium 8.9 8.5 - 10.1 mg/dL Final         5/26/2018 10:10 AM      Component Results     Component Value Ref Range & Units Status    WBC 11.2 (H) 4.0 - 11.0 10e9/L Final    RBC Count 4.72 3.8 - 5.2 10e12/L Final    Hemoglobin 11.4 (L) 11.7 - 15.7 g/dL Final    Hematocrit 37.1 35.0 - 47.0 % Final    MCV 79 78 - 100 fl Final    MCH 24.2 (L) 26.5 - 33.0 pg Final    MCHC 30.7 (L) 31.5 - 36.5 g/dL Final    RDW 16.0 (H) 10.0 - 15.0 % Final    Platelet Count 318 150 - 450  10e9/L Final    Diff Method Automated Method  Final    % Neutrophils 80.4 % Final    % Lymphocytes 9.6 % Final    % Monocytes 8.2 % Final    % Eosinophils 1.2 % Final    % Basophils 0.3 % Final    % Immature Granulocytes 0.3 % Final    Nucleated RBCs 0 0 /100 Final    Absolute Neutrophil 9.0 (H) 1.6 - 8.3 10e9/L Final    Absolute Lymphocytes 1.1 0.8 - 5.3 10e9/L Final    Absolute Monocytes 0.9 0.0 - 1.3 10e9/L Final    Absolute Eosinophils 0.1 0.0 - 0.7 10e9/L Final    Absolute Basophils 0.0 0.0 - 0.2 10e9/L Final    Abs Immature Granulocytes 0.0 0 - 0.4 10e9/L Final    Absolute Nucleated RBC 0.0  Final         5/26/2018 10:09 AM                Clinical Quality Measure: Blood Pressure Screening     Your blood pressure was checked while you were in the emergency department today. The last reading we obtained was  BP: 124/83 . Please read the guidelines below about what these numbers mean and what you should do about them.  If your systolic blood pressure (the top number) is less than 120 and your diastolic blood pressure (the bottom number) is less than 80, then your blood pressure is normal. There is nothing more that you need to do about it.  If your systolic blood pressure (the top number) is 120-139 or your diastolic blood pressure (the bottom number) is 80-89, your blood pressure may be higher than it should be. You should have your blood pressure rechecked within a year by a primary care provider.  If your systolic blood pressure (the top number) is 140 or greater or your diastolic blood pressure (the bottom number) is 90 or greater, you may have high blood pressure. High blood pressure is treatable, but if left untreated over time it can put you at risk for heart attack, stroke, or kidney failure. You should have your blood pressure rechecked by a primary care provider within the next 4 weeks.  If your provider in the emergency department today gave you specific instructions to follow-up with your doctor or  "provider even sooner than that, you should follow that instruction and not wait for up to 4 weeks for your follow-up visit.        Thank you for choosing [facility]       Thank you for choosing [facility] for your care. Our goal is always to provide you with excellent care. Hearing back from our patients is one way we can continue to improve our services. Please take a few minutes to complete the written survey that you may receive in the mail after you visit with us. Thank you!        [employer] Information     [employer] lets you send messages to your doctor, view your test results, renew your prescriptions, schedule appointments and more. To sign up, go to [email or web address] . Click on \"Log in\" on the left side of the screen, which will take you to the Welcome page. Then click on \"Sign up Now\" on the right side of the page.     You will be asked to enter the access code listed below, as well as some personal information. Please follow the directions to create your username and password.     Your access code is: [email or web address]  Expires: 2018  5:08 PM     Your access code will  in 90 days. If you need help or a new code, please call your [facility] clinic or [phone].        Care EveryWhere ID     This is your Care EveryWhere ID. This could be used by other organizations to access your [facility] medical records  [id number]        Equal Access to Services     CYNDI HUGHES AH: Hadii yvonne Haines, waaxda libiaadaha, qaybta kaalmada sachi, leti miramontes. So [facility] [phone].    ATENCIÓN: Si habla español, tiene a lynch disposición servicios gratuitos de asistencia lingüística. Titus al [phone].    We comply with applicable federal civil rights laws and [place] laws. We do not discriminate on the basis of race, color, national origin, age, disability, sex, sexual orientation, or gender identity.            After Visit Summary       This is your record. Keep this with " you and show to your community pharmacist(s) and doctor(s) at your next visit.

## 2018-05-26 NOTE — ED NOTES
"Patient did answer positively to questions 1 and 2 of the Bailey- Suicide Rating Scale. She states she is in \"intense therapy 7 hours a week\". She denies any current suicidal ideation. States she never actually had intention of acting on a plan. Was hospitalized 2 years ago because of suicidal thoughts. Denies starting or preparing to do anything to end her life in the past three months. This was also discussed with Dr. Yuen in front of the patient at the bedside.   "

## 2018-05-26 NOTE — ED TRIAGE NOTES
Lower abdominal pain, pain with urination and hematuria started this morning at 0500.     Patient states she had a fever of 101 this morning and took 800 mg of ibuprofen at 0700.

## 2018-05-26 NOTE — TELEPHONE ENCOUNTER
Blood in urine and  constant 6/10 on pain scale Groin /  vaginal  area pain  and burning with urination started 5am .  Triage for blood in urine - adult with disposition of have someone drive you to ED now and Pt agrees to go to [facility] now .  .Eugenia Archer RN [place] nurse advisors.      Reason for Disposition    Fever > 100.5 F (38.1 C)     No thermometer but having chills and sweats.    Additional Information    Negative: Shock suspected (e.g., cold/pale/clammy skin, too weak to stand, low BP, rapid pulse)    Negative: Sounds like a life-threatening emergency to the triager    Negative: Urinary catheter, questions about    Negative: Recent back or abdominal injury    Negative: Recent genital injury    Negative: [1] Unable to urinate (or only a few drops) > 4 hours AND [2] bladder feels very full (e.g., palpable bladder or strong urge to urinate)    Negative: Passing pure blood or large blood clots (i.e., size > a dime) (Exception: kimi or small strands)    Protocols used: [facility]

## 2018-05-28 LAB
BACTERIA SPEC CULT: ABNORMAL
Lab: ABNORMAL
SPECIMEN SOURCE: ABNORMAL

## 2018-10-01 ENCOUNTER — OFFICE VISIT (OUTPATIENT)
Dept: FAMILY MEDICINE | Facility: CLINIC | Age: 23
End: 2018-10-01
Payer: COMMERCIAL

## 2018-10-01 VITALS
SYSTOLIC BLOOD PRESSURE: 109 MMHG | BODY MASS INDEX: 29.03 KG/M2 | WEIGHT: 185 LBS | OXYGEN SATURATION: 98 % | HEIGHT: 67 IN | DIASTOLIC BLOOD PRESSURE: 60 MMHG | HEART RATE: 86 BPM | TEMPERATURE: 97.8 F

## 2018-10-01 DIAGNOSIS — J01.90 ACUTE SINUSITIS WITH SYMPTOMS > 10 DAYS: ICD-10-CM

## 2018-10-01 DIAGNOSIS — F33.1 MAJOR DEPRESSIVE DISORDER, RECURRENT EPISODE, MODERATE (H): Primary | ICD-10-CM

## 2018-10-01 PROCEDURE — 99213 OFFICE O/P EST LOW 20 MIN: CPT | Performed by: PHYSICIAN ASSISTANT

## 2018-10-01 RX ORDER — AMOXICILLIN 875 MG
875 TABLET ORAL 2 TIMES DAILY
Qty: 20 TABLET | Refills: 0 | Status: SHIPPED | OUTPATIENT
Start: 2018-10-01 | End: 2022-12-01

## 2018-10-01 NOTE — MR AVS SNAPSHOT
"              After Visit Summary   10/1/2018    Kathi Enriquez    MRN: [id number]           Patient Information     Date Of Birth          1995        Visit Information        Provider Department      10/1/2018 11:15 AM Aaseby-Aguilera, Ramona Ann, PA-C [facility]        Today's Diagnoses     Major depressive disorder, recurrent episode, moderate (H)    -  1    Acute sinusitis with symptoms > 10 days          Care Instructions    (F33.1) Major depressive disorder, recurrent episode, moderate (H)  (primary encounter diagnosis)  Comment:   Plan: stable. Sees therapist and psychiatrist at [facility]     (J01.90) Acute sinusitis with symptoms > 10 days  Comment:   Plan: amoxicillin (AMOXIL) 875 MG tablet        The patient is advised to push fluids, rest, gargle warm salt water, use acetaminophen, ibuprofen as needed and Return office visit if symptoms persist or worsen.                Follow-ups after your visit        Who to contact     If you have questions or need follow up information about today's clinic visit or your schedule please contact [facility] directly at [phone].  Normal or non-critical lab and imaging results will be communicated to you by HeadMixhart, letter or phone within 4 business days after the clinic has received the results. If you do not hear from us within 7 days, please contact the clinic through Breezyt or phone. If you have a critical or abnormal lab result, we will notify you by phone as soon as possible.  Submit refill requests through [employer] or call your pharmacy and they will forward the refill request to us. Please allow 3 business days for your refill to be completed.          Additional Information About Your Visit        MyChart Information     [employer] lets you send messages to your doctor, view your test results, renew your prescriptions, schedule appointments and more. To sign up, go to [email or web address] . Click on \"Log in\" " "on the left side of the screen, which will take you to the Welcome page. Then click on \"Sign up Now\" on the right side of the page.     You will be asked to enter the access code listed below, as well as some personal information. Please follow the directions to create your username and password.     Your access code is: [email or web address]  Expires: 2018 11:36 AM     Your access code will  in 90 days. If you need help or a new code, please call your [facility] or [phone].        Care EveryWhere ID     This is your Care EveryWhere ID. This could be used by other organizations to access your [facility] medical records  [id number]        Your Vitals Were     Pulse Temperature Height Pulse Oximetry BMI (Body Mass Index)       86 97.8  F (36.6  C) (Oral) 5' 7\" (1.702 m) 98% 28.98 kg/m2        Blood Pressure from Last 3 Encounters:   10/01/18 109/60   18 104/79   18 114/66    Weight from Last 3 Encounters:   10/01/18 185 lb (83.9 kg)   18 185 lb (83.9 kg)   18 186 lb 14.4 oz (84.8 kg)              Today, you had the following     No orders found for display         Today's Medication Changes          These changes are accurate as of 10/1/18 11:36 AM.  If you have any questions, ask your nurse or doctor.               Start taking these medicines.        Dose/Directions    amoxicillin 875 MG tablet   Commonly known as:  AMOXIL   Used for:  Acute sinusitis with symptoms > 10 days   Started by:  Aaseby-Aguilera, Ramona Ann, PA-C        Dose:  875 mg   Take 1 tablet (875 mg) by mouth 2 times daily   Quantity:  20 tablet   Refills:  0         Stop taking these medicines if you haven't already. Please contact your care team if you have questions.     benzonatate 200 MG capsule   Commonly known as:  TESSALON   Stopped by:  Aaseby-Aguilera, Ramona Ann, PA-C           nitroFURantoin (macrocrystal-monohydrate) 100 MG capsule   Commonly known as:  MACROBID   Stopped by:  Aaseby-Aguilera, Ramona " AYAZ Mi                Where to get your medicines      These medications were sent to [id number] - [place], [place] - [address]  [address], [facility] [address]     Phone:  [phone]     amoxicillin 875 MG tablet                Primary Care Provider Fax #    Physician No Ref-Primary [phone]       No address on file        Equal Access to Services     [facility]: Hadii aad ku hadasho Soomaali, waaxda luqadaha, qaybta kaalmada adeegyada, waxay idiin hayaan adeeg kharash la'domenico . So [facility] [phone].    ATENCIÓN: Si habla español, tiene a lynch disposición servicios gratuitos de asistencia lingüística. Aideeadrian al [phone].    We comply with applicable federal civil rights laws and [place] laws. We do not discriminate on the basis of race, color, national origin, age, disability, sex, sexual orientation, or gender identity.            Thank you!     Thank you for choosing [facility]  for your care. Our goal is always to provide you with excellent care. Hearing back from our patients is one way we can continue to improve our services. Please take a few minutes to complete the written survey that you may receive in the mail after your visit with us. Thank you!             Your Updated Medication List - Protect others around you: Learn how to safely use, store and throw away your medicines at www.disposemymeds.org.          This list is accurate as of 10/1/18 11:36 AM.  Always use your most recent med list.                   Brand Name Dispense Instructions for use Diagnosis    ALPRAZolam 0.25 MG tablet    XANAX     3 times daily as needed        amoxicillin 875 MG tablet    AMOXIL    20 tablet    Take 1 tablet (875 mg) by mouth 2 times daily    Acute sinusitis with symptoms > 10 days

## 2018-10-01 NOTE — PROGRESS NOTES
SUBJECTIVE:   Kathi Enriquez is a 22 year old female who presents to clinic today for the following health issues:      Acute Illness   Acute illness concerns: cough  Onset: over 10 days     Fever: no - on and off last week     Chills/Sweats: YES- sweats    Headache (location?): no     Sinus Pressure:YES    Conjunctivitis:  no    Ear Pain: YES: both    Rhinorrhea: YES    Congestion: YES    Sore Throat: YES     Cough: YES-non-productive, productive at times        Wheeze: YES    Decreased Appetite: yes    Nausea: no    Vomiting: no    Diarrhea:  no    Dysuria/Freq.: no    Fatigue/Achiness: YES    Sick/Strep Exposure: no     Therapies Tried and outcome:           Problem list and histories reviewed & adjusted, as indicated.  Additional history: as documented    Current Outpatient Prescriptions   Medication Sig Dispense Refill     ALPRAZolam (XANAX) 0.25 MG tablet 3 times daily as needed       amoxicillin (AMOXIL) 875 MG tablet Take 1 tablet (875 mg) by mouth 2 times daily 20 tablet 0     Recent Labs   Lab Test  05/26/18   1001  06/22/16   0851  10/31/12   0805   ALT   --    --   24   CR  0.67  0.75  0.72   GFRESTIMATED  >90  >90  Non African American GFR Calc    >90   GFRESTBLACK  >90  >90  African American GFR Calc    >90   POTASSIUM  3.5  4.3  4.2   TSH   --   1.76   --       BP Readings from Last 3 Encounters:   10/01/18 109/60   05/26/18 104/79   04/24/18 114/66    Wt Readings from Last 3 Encounters:   10/01/18 185 lb (83.9 kg)   05/26/18 185 lb (83.9 kg)   04/24/18 186 lb 14.4 oz (84.8 kg)                    Reviewed and updated as needed this visit by clinical staff       Reviewed and updated as needed this visit by Provider         ROS:  Constitutional, HEENT, cardiovascular, pulmonary, gi and gu systems are negative, except as otherwise noted.    OBJECTIVE:                                                    /60 (BP Location: Right arm, Patient Position: Chair, Cuff Size: Adult Large)  Pulse 86   "Temp 97.8  F (36.6  C) (Oral)  Ht 5' 7\" (1.702 m)  Wt 185 lb (83.9 kg)  SpO2 98%  BMI 28.98 kg/m2  Body mass index is 28.98 kg/(m^2).  GENERAL APPEARANCE: healthy, alert and no distress  HENT: ear canals and TM's normal and nose and mouth without ulcers or lesions  RESP: lungs clear to auscultation - no rales, rhonchi or wheezes  CV: regular rates and rhythm, normal S1 S2, no S3 or S4 and no murmur, click or rub  LYMPHATICS: no cervical adenopathy    Diagnostic test results:  Diagnostic Test Results:  none      ASSESSMENT/PLAN:                                                    1. Major depressive disorder, recurrent episode, moderate (H)      2. Acute sinusitis with symptoms > 10 days    - amoxicillin (AMOXIL) 875 MG tablet; Take 1 tablet (875 mg) by mouth 2 times daily  Dispense: 20 tablet; Refill: 0      Patient Instructions   (F33.1) Major depressive disorder, recurrent episode, moderate (H)  (primary encounter diagnosis)  Comment:   Plan: stable. Sees therapist and psychiatrist at [facility]     (J01.90) Acute sinusitis with symptoms > 10 days  Comment:   Plan: amoxicillin (AMOXIL) 875 MG tablet        The patient is advised to push fluids, rest, gargle warm salt water, use acetaminophen, ibuprofen as needed and Return office visit if symptoms persist or worsen.            Ramona Ann Aaseby-Aguilera, PA-C  [facility]      "

## 2018-10-02 ASSESSMENT — PATIENT HEALTH QUESTIONNAIRE - PHQ9: SUM OF ALL RESPONSES TO PHQ QUESTIONS 1-9: 21

## 2019-04-11 ENCOUNTER — OFFICE VISIT (OUTPATIENT)
Dept: PEDIATRICS | Facility: CLINIC | Age: 24
End: 2019-04-11
Payer: COMMERCIAL

## 2019-04-11 VITALS
TEMPERATURE: 97.8 F | HEART RATE: 97 BPM | SYSTOLIC BLOOD PRESSURE: 110 MMHG | DIASTOLIC BLOOD PRESSURE: 62 MMHG | OXYGEN SATURATION: 100 % | HEIGHT: 67 IN | BODY MASS INDEX: 28.88 KG/M2 | WEIGHT: 184 LBS

## 2019-04-11 DIAGNOSIS — H66.90 ACUTE OTITIS MEDIA, UNSPECIFIED OTITIS MEDIA TYPE: Primary | ICD-10-CM

## 2019-04-11 DIAGNOSIS — F33.9 RECURRENT MAJOR DEPRESSION RESISTANT TO TREATMENT (H): ICD-10-CM

## 2019-04-11 PROCEDURE — 99214 OFFICE O/P EST MOD 30 MIN: CPT | Performed by: NURSE PRACTITIONER

## 2019-04-11 ASSESSMENT — ANXIETY QUESTIONNAIRES
IF YOU CHECKED OFF ANY PROBLEMS ON THIS QUESTIONNAIRE, HOW DIFFICULT HAVE THESE PROBLEMS MADE IT FOR YOU TO DO YOUR WORK, TAKE CARE OF THINGS AT HOME, OR GET ALONG WITH OTHER PEOPLE: SOMEWHAT DIFFICULT
GAD7 TOTAL SCORE: 20
5. BEING SO RESTLESS THAT IT IS HARD TO SIT STILL: NEARLY EVERY DAY
1. FEELING NERVOUS, ANXIOUS, OR ON EDGE: NEARLY EVERY DAY
6. BECOMING EASILY ANNOYED OR IRRITABLE: MORE THAN HALF THE DAYS
2. NOT BEING ABLE TO STOP OR CONTROL WORRYING: NEARLY EVERY DAY
3. WORRYING TOO MUCH ABOUT DIFFERENT THINGS: NEARLY EVERY DAY
7. FEELING AFRAID AS IF SOMETHING AWFUL MIGHT HAPPEN: NEARLY EVERY DAY

## 2019-04-11 ASSESSMENT — PATIENT HEALTH QUESTIONNAIRE - PHQ9
SUM OF ALL RESPONSES TO PHQ QUESTIONS 1-9: 20
5. POOR APPETITE OR OVEREATING: NEARLY EVERY DAY

## 2019-04-11 ASSESSMENT — MIFFLIN-ST. JEOR: SCORE: 1622.25

## 2019-04-11 NOTE — PATIENT INSTRUCTIONS
Augmentin (antibiotic) twice a day for 7 days  Lots of fluids  Use flonase nasal spray to reduce inflammation

## 2019-04-11 NOTE — PROGRESS NOTES
"  SUBJECTIVE:   Kathi Enriquez is a 23 year old female who presents to clinic today for the following health issues:      Acute Illness   Acute illness concerns: ear infection  Onset: 2 days    Fever: YES- 101.2 on Tues.     Chills/Sweats: YES- chills    Headache (location?): YES    Sinus Pressure:YES    Conjunctivitis:  no    Ear Pain: YES: right mainly, some left pain    Rhinorrhea: YES    Congestion: YES    Sore Throat: YES- ticklish     Cough: YES    Wheeze: no     Decreased Appetite: YES    Nausea: YES    Vomiting: no     Diarrhea:  no     Fatigue/Achiness: YES    Sick/Strep Exposure: YES- kid she 's had a cold a couple weeks ago,     Therapies Tried and outcome: Ear drops (not helped), Advil (helped headache and ear pain), Nose sprays (helped).         Additional history: as documented    Reviewed  and updated as needed this visit by clinical staff         Reviewed and updated as needed this visit by Provider         Current Outpatient Medications   Medication Sig Dispense Refill     ALPRAZolam (XANAX) 0.25 MG tablet 3 times daily as needed       amoxicillin-clavulanate (AUGMENTIN) 875-125 MG tablet Take 1 tablet by mouth 2 times daily for 7 days 14 tablet 0     amoxicillin (AMOXIL) 875 MG tablet Take 1 tablet (875 mg) by mouth 2 times daily (Patient not taking: Reported on 4/11/2019) 20 tablet 0     No Known Allergies    ROS:  Constitutional, HEENT, cardiovascular, pulmonary, gi and gu systems are negative, except as otherwise noted.    OBJECTIVE:     /62 (BP Location: Right arm, Patient Position: Chair, Cuff Size: Adult Regular)   Pulse 97   Temp 97.8  F (36.6  C) (Oral)   Ht 1.702 m (5' 7\")   Wt 83.5 kg (184 lb)   SpO2 100%   BMI 28.82 kg/m    Body mass index is 28.82 kg/m .  GENERAL: healthy, alert and no distress  HENT: normal cephalic/atraumatic, right ear: clear effusion and canal also red but no tragal sensitivity, left ear: normal: no effusions, no erythema, normal landmarks, nose " and mouth without ulcers or lesions, oropharynx clear, oral mucous membranes moist and tonsillar erythema  NECK: no adenopathy, no asymmetry, masses, or scars and thyroid normal to palpation  MS: no gross musculoskeletal defects noted, no edema  PSYCH: mentation appears normal, affect normal/bright    Diagnostic Test Results:  none     ASSESSMENT/PLAN:     (H66.90) Acute otitis media, unspecified otitis media type  (primary encounter diagnosis)  Comment: Right ear pain and pressure x 3 days in conjunction with upper respiratory symptoms. Has tried OTC ear drops without relief. Clear effusion with red TM and canal. Otitis externa is a possibility but with no tragal sensitivity will treat as otitis media. Discussed option of watchful waiting vs. Antibiotic treatment. Patient preferred to start antibiotics today.   Plan: amoxicillin-clavulanate (AUGMENTIN) 875-125 MG         Tablet  -Augmentin x 7 days  -drink plenty of fluids  -stop using OTC ear drops  -Discussed supportive cares and reasons to return. Discussed reasons to seek care urgently.   -Discussed signs of otitis externa    (F33.9) Recurrent major depression resistant to treatment (H)  Comment: Continues to see psychiatry and therapist. Suicidal is passive and chronic. No plans or intention to harm herself.  Plan: MENTAL HEALTH REFERRAL  - Adult; Psychiatry and        Medication Management; Psychiatry; [facility]:         Psychiatry Clinic [phone]; We will         contact you to schedule the appointment or         please call with any questions  Recommended treatment resistant depression clinic. She agrees  Contracted for safety.     See Patient Instructions    Elaine Khan, [employer] DNP student participated in the care of the patient. The above documentation reflects my personal history and physical exam findings.      DENNY Gutiérrez [facility]

## 2019-04-12 ASSESSMENT — ANXIETY QUESTIONNAIRES: GAD7 TOTAL SCORE: 20

## 2019-12-09 ENCOUNTER — TELEPHONE (OUTPATIENT)
Dept: PEDIATRICS | Facility: CLINIC | Age: 24
End: 2019-12-09

## 2019-12-09 NOTE — TELEPHONE ENCOUNTER
Panel Management Review      Patient has the following on her problem list:   Depression / Dysthymia review    Measure:  Needs PHQ-9 score of 4 or less during index window.  Administer PHQ-9 and if score is 5 or more, send encounter to provider for next steps.    5 - 7 month window range:     PHQ-9 SCORE 4/24/2018 10/1/2018 4/11/2019   PHQ-9 Total Score - - -   PHQ-9 Total Score MyChart 24 (Severe depression) - -   PHQ-9 Total Score 24 21 20       If PHQ-9 recheck is 5 or more, route to provider for next steps.    Patient is due for:  PHQ9      Composite cancer screening  Chart review shows that this patient is due/due soon for the following Pap Smear  Summary:    Patient is due/failing the following:   ASHLEY, Dep/Anx FOLLOW UP, PAP, PHQ9 and PHYSICAL    Action needed:   Patient needs office visit for physical w/pap, ASHLEY, dep/anx follow up and Patient needs to do PHQ9.    Type of outreach:    Phone, left message for patient to call back.     Questions for provider review:    None                                                                              Shayy Mcgill CMA    Chart routed to Care Team.

## 2019-12-09 NOTE — LETTER
December 12, 2019      Kathi Enriquez  [address]   [place] [address]        Dear Kathi,       We care about your health and have reviewed your health plan including your medical conditions, medications, and lab results.  Based on this review, it is recommended that you follow up regarding the following health topic(s):  -Depression  -Cervical Cancer Screening  -Wellness (Physical) Visit   -Anxiety    We recommend you take the following action(s):  -schedule a WELLNESS (Physical) APPOINTMENT.  We will perform the following labs: pap smear.     Please call us at the [facility] - [phone] (or use Yoke) to address the above recommendations.     Thank you for trusting [facility] and we appreciate the opportunity to serve you.  We look forward to supporting your healthcare needs in the future.    Healthy Regards,    Your Health Care Team  [facility]

## 2019-12-20 NOTE — TELEPHONE ENCOUNTER
Called and left VM for patient to contact clinic.  Patient needs physical w/pap & dep/anx f/u, PHQ9.  Shayy Mcgill, CMA

## 2020-04-06 ENCOUNTER — TELEPHONE (OUTPATIENT)
Dept: PEDIATRICS | Facility: CLINIC | Age: 25
End: 2020-04-06

## 2020-04-06 NOTE — LETTER
April 10, 2020      Kathi Enriquez  [address]   [place] [address]        Dear Kathi,       We care about your health and have reviewed your health plan including your medical conditions, medications, and lab results.  Based on this review, it is recommended that you follow up regarding the following health topic(s):  -Depression  -Wellness (Physical) Visit   -Anxiety    We recommend you take the following action(s):  -schedule a FOLLOWUP APPOINTMENT.    At this time [place] is not seeing patients in person due to COVID-19. Our providers are doing phone and video visits.     Please call us at the [facility] - [phone] (or use Hydrocision) to address the above recommendations.     Thank you for trusting [facility] and we appreciate the opportunity to serve you.  We look forward to supporting your healthcare needs in the future.    Healthy Regards,    Your Health Care Team  [facility]

## 2020-04-06 NOTE — TELEPHONE ENCOUNTER
Panel Management Review      Patient has the following on her problem list:     Depression / Dysthymia review    Measure:  Needs PHQ-9 score of 4 or less during index window.  Administer PHQ-9 and if score is 5 or more, send encounter to provider for next steps.    5 - 7 month window range: last seen 4/11/19    PHQ-9 SCORE 4/24/2018 10/1/2018 4/11/2019   PHQ-9 Total Score - - -   PHQ-9 Total Score MyChart 24 (Severe depression) - -   PHQ-9 Total Score 24 21 20       If PHQ-9 recheck is 5 or more, route to provider for next steps.    Patient is due for:  PHQ9 and ASHLEY      Composite cancer screening  Chart review shows that this patient is due/due soon for the following Pap Smear  Summary:    Patient is due/failing the following:   ASHLEY, FOLLOW UP, PAP, PHQ9 and PHYSICAL    Action needed:   Patient needs office visit for physical and pap. and Patient needs to do PHQ9 & ASHLEY.    Type of outreach:    Phone, left message for patient to call back.     Questions for provider review:    None                                                                                                                                  Shayy Mcgill CMA    Chart routed to Care Team.

## 2020-04-06 NOTE — LETTER
April 17, 2020      Kathi Enriquez  [address]   [place] [address]        Dear Kathi,       We care about your health and have reviewed your health plan including your medical conditions, medications, and lab results.  Based on this review, it is recommended that you follow up regarding the following health topic(s):  -Depression  -Cervical Cancer Screening  -Wellness (Physical) Visit   -Anxiety    We recommend you take the following action(s):  -schedule a WELLNESS (Physical) APPOINTMENT.  We will perform the following labs: pap smear.     Please call us at the [facility] - [phone] (or use Web [employer] Ro[employer]) to address the above recommendations.     Thank you for trusting [facility] and we appreciate the opportunity to serve you.  We look forward to supporting your healthcare needs in the future.    Healthy Regards,    Your Health Care Team  [facility]

## 2020-04-20 NOTE — TELEPHONE ENCOUNTER
Called and left VM for patient to contact clinic.  Patient needs physical w/ pap, dep/anx f/u PHQ9 & ASHLEY.  Shayy Mcgill, CMA

## 2020-05-12 ENCOUNTER — TELEPHONE (OUTPATIENT)
Dept: PEDIATRICS | Facility: CLINIC | Age: 25
End: 2020-05-12

## 2020-05-12 NOTE — TELEPHONE ENCOUNTER
Received return mail for appointment reminder for patient.  Left message for patient requesting a call back to confirm mailing address.     Fanny Morales

## 2020-11-16 ENCOUNTER — TELEPHONE (OUTPATIENT)
Dept: PEDIATRICS | Facility: CLINIC | Age: 25
End: 2020-11-16

## 2020-11-16 NOTE — TELEPHONE ENCOUNTER
Patient Quality Outreach      Summary:    Patient has the following on her problem list/HM:   Depression / Dysthymia review  6 Month Remission: 4-8 month window range:   12 Month Remission: 10-14 month window range:   PHQ-9 SCORE 4/24/2018 10/1/2018 4/11/2019   PHQ-9 Total Score - - -   PHQ-9 Total Score MyChart 24 (Severe depression) - -   PHQ-9 Total Score 24 21 20   If PHQ-9 recheck is 5 or more, route to provider for next steps.    Patient is due/failing the following:   Cervical Cancer Screening - PAP Needed, PHQ-9 Needed and Depression follow-up visit and Adult/Adolescent physical, date due: 4/24/19    Type of outreach:    Phone, left message for patient/parent to call back.    Questions for provider review:    None                                                  Shayy Mcgill CMA    Chart routed to Care Team.

## 2020-11-16 NOTE — LETTER
November 19, 2020      Kathi Enriquez  [address] [place] [place] [address]        Dear Kathi,       We care about your health and have reviewed your health plan including your medical conditions, medications, and lab results.  Based on this review, it is recommended that you follow up regarding the following health topic(s):  -Depression  -Cervical Cancer Screening  -Wellness (Physical) Visit      According to our records you have not been seen by a provider since 4/11/19.    We recommend you take the following action(s):  -schedule a WELLNESS (Physical) APPOINTMENT.  We will perform the following labs: pap smear.  -Complete and return the attached PHQ-9 Form.  If your total score is greater than 9, please schedule a followup appointment.  If you answer Yes to question 9, call your clinic between the hours of 8 to 5.  You may also call the Suicide Hotline at 4-395-250-FWSC (0241) any time.     You can return the enclosed questionnaire via mail or drop it off at the clinic.    Please call us at the [facility] - [phone] (or use [employer]) to address the above recommendations.     Thank you for trusting [facility] and we appreciate the opportunity to serve you.  We look forward to supporting your healthcare needs in the future.    Healthy Regards,    Your Health Care Team  [facility]

## 2020-11-27 NOTE — TELEPHONE ENCOUNTER
Called and left VM for patient to contact clinic.  Patient needs physical w/ pap & depress f/u.  Shayy Mcgill, CMA

## 2022-03-21 ENCOUNTER — HOSPITAL ENCOUNTER (EMERGENCY)
Facility: CLINIC | Age: 27
Discharge: LEFT BEFORE DISCHARGE | End: 2022-03-22
Admitting: EMERGENCY MEDICINE
Payer: COMMERCIAL

## 2022-03-21 VITALS
OXYGEN SATURATION: 100 % | HEIGHT: 68 IN | HEART RATE: 121 BPM | BODY MASS INDEX: 22.88 KG/M2 | RESPIRATION RATE: 20 BRPM | SYSTOLIC BLOOD PRESSURE: 133 MMHG | WEIGHT: 151 LBS | DIASTOLIC BLOOD PRESSURE: 83 MMHG

## 2022-03-21 LAB
BASOPHILS # BLD AUTO: 0 10E3/UL (ref 0–0.2)
BASOPHILS NFR BLD AUTO: 0 %
EOSINOPHIL # BLD AUTO: 0.2 10E3/UL (ref 0–0.7)
EOSINOPHIL NFR BLD AUTO: 2 %
ERYTHROCYTE [DISTWIDTH] IN BLOOD BY AUTOMATED COUNT: 15.5 % (ref 10–15)
HCT VFR BLD AUTO: 36.4 % (ref 35–47)
HGB BLD-MCNC: 11.4 G/DL (ref 11.7–15.7)
IMM GRANULOCYTES # BLD: 0 10E3/UL
IMM GRANULOCYTES NFR BLD: 0 %
LYMPHOCYTES # BLD AUTO: 0.7 10E3/UL (ref 0.8–5.3)
LYMPHOCYTES NFR BLD AUTO: 7 %
MCH RBC QN AUTO: 25.2 PG (ref 26.5–33)
MCHC RBC AUTO-ENTMCNC: 31.3 G/DL (ref 31.5–36.5)
MCV RBC AUTO: 81 FL (ref 78–100)
MONOCYTES # BLD AUTO: 0.2 10E3/UL (ref 0–1.3)
MONOCYTES NFR BLD AUTO: 2 %
NEUTROPHILS # BLD AUTO: 8.7 10E3/UL (ref 1.6–8.3)
NEUTROPHILS NFR BLD AUTO: 89 %
NRBC # BLD AUTO: 0 10E3/UL
NRBC BLD AUTO-RTO: 0 /100
PLATELET # BLD AUTO: 306 10E3/UL (ref 150–450)
RBC # BLD AUTO: 4.52 10E6/UL (ref 3.8–5.2)
WBC # BLD AUTO: 9.8 10E3/UL (ref 4–11)

## 2022-03-21 PROCEDURE — 999N000104 HC STATISTIC NO CHARGE

## 2022-03-21 PROCEDURE — 96361 HYDRATE IV INFUSION ADD-ON: CPT

## 2022-03-21 PROCEDURE — 96360 HYDRATION IV INFUSION INIT: CPT

## 2022-03-21 PROCEDURE — 82310 ASSAY OF CALCIUM: CPT | Performed by: EMERGENCY MEDICINE

## 2022-03-21 PROCEDURE — 258N000003 HC RX IP 258 OP 636: Performed by: EMERGENCY MEDICINE

## 2022-03-21 PROCEDURE — 250N000011 HC RX IP 250 OP 636: Performed by: EMERGENCY MEDICINE

## 2022-03-21 PROCEDURE — 82374 ASSAY BLOOD CARBON DIOXIDE: CPT | Performed by: EMERGENCY MEDICINE

## 2022-03-21 PROCEDURE — 85025 COMPLETE CBC W/AUTO DIFF WBC: CPT | Performed by: EMERGENCY MEDICINE

## 2022-03-21 PROCEDURE — 36415 COLL VENOUS BLD VENIPUNCTURE: CPT | Performed by: EMERGENCY MEDICINE

## 2022-03-21 PROCEDURE — 84702 CHORIONIC GONADOTROPIN TEST: CPT | Performed by: EMERGENCY MEDICINE

## 2022-03-21 RX ORDER — ONDANSETRON 4 MG/1
4 TABLET, ORALLY DISINTEGRATING ORAL ONCE
Status: COMPLETED | OUTPATIENT
Start: 2022-03-21 | End: 2022-03-21

## 2022-03-21 RX ADMIN — SODIUM CHLORIDE 500 ML: 9 INJECTION, SOLUTION INTRAVENOUS at 23:50

## 2022-03-21 RX ADMIN — ONDANSETRON 4 MG: 4 TABLET, ORALLY DISINTEGRATING ORAL at 23:50

## 2022-03-22 LAB
ANION GAP SERPL CALCULATED.3IONS-SCNC: 7 MMOL/L (ref 3–14)
B-HCG SERPL-ACNC: <1 IU/L (ref 0–5)
BUN SERPL-MCNC: 5 MG/DL (ref 7–30)
CALCIUM SERPL-MCNC: 8.4 MG/DL (ref 8.5–10.1)
CHLORIDE BLD-SCNC: 105 MMOL/L (ref 94–109)
CO2 SERPL-SCNC: 26 MMOL/L (ref 20–32)
CREAT SERPL-MCNC: 0.68 MG/DL (ref 0.52–1.04)
GFR SERPL CREATININE-BSD FRML MDRD: >90 ML/MIN/1.73M2
GLUCOSE BLD-MCNC: 107 MG/DL (ref 70–99)
HOLD SPECIMEN: NORMAL
POTASSIUM BLD-SCNC: 3.5 MMOL/L (ref 3.4–5.3)
SODIUM SERPL-SCNC: 138 MMOL/L (ref 133–144)

## 2022-03-22 NOTE — ED TRIAGE NOTES
Patient comes in with severe abdominal pain starting on Sunday morning. Patient has been throwing up and diarrhea since Sunday AM. Patient states she feels like she has knives in her abdomen. Patient also has a headache. Patient has been able to keep down broth from earlier today.

## 2022-04-07 PROCEDURE — 88305 TISSUE EXAM BY PATHOLOGIST: CPT | Mod: TC,ORL | Performed by: INTERNAL MEDICINE

## 2022-04-08 ENCOUNTER — LAB REQUISITION (OUTPATIENT)
Dept: LAB | Facility: CLINIC | Age: 27
End: 2022-04-08
Payer: COMMERCIAL

## 2022-04-08 DIAGNOSIS — R10.31 RIGHT LOWER QUADRANT PAIN: ICD-10-CM

## 2022-04-08 DIAGNOSIS — K64.4 RESIDUAL HEMORRHOIDAL SKIN TAGS: ICD-10-CM

## 2022-04-08 DIAGNOSIS — R10.13 EPIGASTRIC PAIN: ICD-10-CM

## 2022-04-08 DIAGNOSIS — K64.8 OTHER HEMORRHOIDS: ICD-10-CM

## 2022-04-08 DIAGNOSIS — K92.1 MELENA: ICD-10-CM

## 2022-04-08 DIAGNOSIS — R11.2 NAUSEA WITH VOMITING, UNSPECIFIED: ICD-10-CM

## 2022-04-08 DIAGNOSIS — R93.3 ABNORMAL FINDINGS ON DIAGNOSTIC IMAGING OF OTHER PARTS OF DIGESTIVE TRACT: ICD-10-CM

## 2022-04-08 DIAGNOSIS — K31.89 OTHER DISEASES OF STOMACH AND DUODENUM: ICD-10-CM

## 2022-04-08 DIAGNOSIS — K63.5 POLYP OF COLON: ICD-10-CM

## 2022-04-11 LAB
PATH REPORT.COMMENTS IMP SPEC: NORMAL
PATH REPORT.COMMENTS IMP SPEC: NORMAL
PATH REPORT.FINAL DX SPEC: NORMAL
PATH REPORT.GROSS SPEC: NORMAL
PATH REPORT.MICROSCOPIC SPEC OTHER STN: NORMAL
PATH REPORT.RELEVANT HX SPEC: NORMAL
PHOTO IMAGE: NORMAL

## 2022-04-15 PROCEDURE — 88305 TISSUE EXAM BY PATHOLOGIST: CPT | Mod: 26 | Performed by: PATHOLOGY

## 2022-08-29 NOTE — PATIENT INSTRUCTIONS
(F33.1) Major depressive disorder, recurrent episode, moderate (H)  (primary encounter diagnosis)  Comment:   Plan: stable. Sees therapist and psychiatrist at [facility]     (J01.90) Acute sinusitis with symptoms > 10 days  Comment:   Plan: amoxicillin (AMOXIL) 875 MG tablet        The patient is advised to push fluids, rest, gargle warm salt water, use acetaminophen, ibuprofen as needed and Return office visit if symptoms persist or worsen.         6

## 2022-09-02 ENCOUNTER — TRANSFERRED RECORDS (OUTPATIENT)
Dept: MULTI SPECIALTY CLINIC | Facility: CLINIC | Age: 27
End: 2022-09-02

## 2022-09-02 LAB
HEP C HIM: NORMAL
HIV 1&2 EXT: NORMAL
HPV ABSTRACT: NORMAL
PAP-ABSTRACT: NORMAL

## 2022-11-10 ENCOUNTER — E-VISIT (OUTPATIENT)
Dept: URGENT CARE | Facility: CLINIC | Age: 27
End: 2022-11-10
Payer: COMMERCIAL

## 2022-11-10 ENCOUNTER — LAB (OUTPATIENT)
Dept: FAMILY MEDICINE | Facility: CLINIC | Age: 27
End: 2022-11-10
Attending: EMERGENCY MEDICINE
Payer: COMMERCIAL

## 2022-11-10 DIAGNOSIS — J02.9 PHARYNGITIS, UNSPECIFIED ETIOLOGY: ICD-10-CM

## 2022-11-10 DIAGNOSIS — R05.1 ACUTE COUGH: Primary | ICD-10-CM

## 2022-11-10 DIAGNOSIS — R05.1 ACUTE COUGH: ICD-10-CM

## 2022-11-10 LAB
DEPRECATED S PYO AG THROAT QL EIA: NEGATIVE
FLUAV AG SPEC QL IA: NEGATIVE
FLUBV AG SPEC QL IA: NEGATIVE
GROUP A STREP BY PCR: NOT DETECTED
SARS-COV-2 RNA RESP QL NAA+PROBE: NEGATIVE

## 2022-11-10 PROCEDURE — U0005 INFEC AGEN DETEC AMPLI PROBE: HCPCS

## 2022-11-10 PROCEDURE — 87651 STREP A DNA AMP PROBE: CPT

## 2022-11-10 PROCEDURE — 99421 OL DIG E/M SVC 5-10 MIN: CPT | Performed by: EMERGENCY MEDICINE

## 2022-11-10 PROCEDURE — 87804 INFLUENZA ASSAY W/OPTIC: CPT

## 2022-11-10 PROCEDURE — U0003 INFECTIOUS AGENT DETECTION BY NUCLEIC ACID (DNA OR RNA); SEVERE ACUTE RESPIRATORY SYNDROME CORONAVIRUS 2 (SARS-COV-2) (CORONAVIRUS DISEASE [COVID-19]), AMPLIFIED PROBE TECHNIQUE, MAKING USE OF HIGH THROUGHPUT TECHNOLOGIES AS DESCRIBED BY CMS-2020-01-R: HCPCS

## 2022-11-29 ENCOUNTER — TELEPHONE (OUTPATIENT)
Dept: FAMILY MEDICINE | Facility: CLINIC | Age: 27
End: 2022-11-29

## 2022-11-29 NOTE — TELEPHONE ENCOUNTER
Reason for Call:  Appointment Request    Patient requesting this type of appt:  Pregnancy     Requested provider: No Ref-Primary, Physician    Reason patient unable to be scheduled: Need appointment    When does patient want to be seen/preferred time: 3-7 days    Comments: Need first prenatal appointment     Could we send this information to you in [facility] or would you prefer to receive a phone call?:   Patient would prefer a phone call   Okay to leave a detailed message?: Yes at Home number on file [phone] (home)    Call taken on 11/29/2022 at 10:06 AM by Billie Olguin

## 2022-11-30 ENCOUNTER — TELEPHONE (OUTPATIENT)
Dept: MIDWIFE SERVICES | Facility: CLINIC | Age: 27
End: 2022-11-30

## 2022-11-30 LAB
ABO/RH(D): NORMAL
ANTIBODY SCREEN: NEGATIVE
SPECIMEN EXPIRATION DATE: NORMAL

## 2022-11-30 NOTE — TELEPHONE ENCOUNTER
Lmp 10/24/22   5w2d     Discussed recommendations for prenatal vitamins:  Prenatal vitamin containing Iron  (27mg) Folic Acid (at least 400mcg)  DHA (200mg)  If no DHA in prenatal vitamin you can always take a separate tablet or  omega 3.    Patient also notes she has had constipation. Does have mild period like cramping in lower pelvic area. No sharp cramping. Able to do normal activities, however is uncomfortable. Denies vaginal bleeding. Last BM was 5 -6 days ago. Some nausea, no vomiting. Drinking plenty of fluids currently.    Discussed constipation recommendations and sent info via [employer]. General pregnancy information sent as well.      Patient will keep us updated on constipation or call sooner if  no BM with interventions or symptoms worsening.   Patient is vegan and her prenatal vitamin does have iron in it.   Fanny Thao RN      .

## 2022-11-30 NOTE — TELEPHONE ENCOUNTER
This patient just scheduled her 1st pre-mel appointments with a Midwife. She told me she is Vegan and wants to ask a Nurse what kind of pre- vitamins she can take? Please call pt back today

## 2022-12-01 ENCOUNTER — TELEPHONE (OUTPATIENT)
Dept: OBGYN | Facility: CLINIC | Age: 27
End: 2022-12-01

## 2022-12-01 ENCOUNTER — OFFICE VISIT (OUTPATIENT)
Dept: MIDWIFE SERVICES | Facility: CLINIC | Age: 27
End: 2022-12-01
Payer: COMMERCIAL

## 2022-12-01 VITALS — WEIGHT: 154.4 LBS | DIASTOLIC BLOOD PRESSURE: 68 MMHG | BODY MASS INDEX: 23.48 KG/M2 | SYSTOLIC BLOOD PRESSURE: 110 MMHG

## 2022-12-01 DIAGNOSIS — O20.9 VAGINAL BLEEDING AFFECTING EARLY PREGNANCY: Primary | ICD-10-CM

## 2022-12-01 LAB
BASOPHILS # BLD AUTO: 0 10E3/UL (ref 0–0.2)
BASOPHILS NFR BLD AUTO: 0 %
EOSINOPHIL # BLD AUTO: 0.3 10E3/UL (ref 0–0.7)
EOSINOPHIL NFR BLD AUTO: 4 %
ERYTHROCYTE [DISTWIDTH] IN BLOOD BY AUTOMATED COUNT: 16.7 % (ref 10–15)
HCG INTACT+B SERPL-ACNC: 788 MIU/ML
HCT VFR BLD AUTO: 36.4 % (ref 35–47)
HGB BLD-MCNC: 11.1 G/DL (ref 11.7–15.7)
LYMPHOCYTES # BLD AUTO: 1.9 10E3/UL (ref 0.8–5.3)
LYMPHOCYTES NFR BLD AUTO: 27 %
MCH RBC QN AUTO: 24.2 PG (ref 26.5–33)
MCHC RBC AUTO-ENTMCNC: 30.5 G/DL (ref 31.5–36.5)
MCV RBC AUTO: 80 FL (ref 78–100)
MONOCYTES # BLD AUTO: 0.5 10E3/UL (ref 0–1.3)
MONOCYTES NFR BLD AUTO: 7 %
NEUTROPHILS # BLD AUTO: 4.3 10E3/UL (ref 1.6–8.3)
NEUTROPHILS NFR BLD AUTO: 61 %
PLATELET # BLD AUTO: 369 10E3/UL (ref 150–450)
RBC # BLD AUTO: 4.58 10E6/UL (ref 3.8–5.2)
WBC # BLD AUTO: 7 10E3/UL (ref 4–11)

## 2022-12-01 PROCEDURE — 86850 RBC ANTIBODY SCREEN: CPT

## 2022-12-01 PROCEDURE — 86900 BLOOD TYPING SEROLOGIC ABO: CPT

## 2022-12-01 PROCEDURE — 36415 COLL VENOUS BLD VENIPUNCTURE: CPT

## 2022-12-01 PROCEDURE — 84702 CHORIONIC GONADOTROPIN TEST: CPT

## 2022-12-01 PROCEDURE — 86901 BLOOD TYPING SEROLOGIC RH(D): CPT

## 2022-12-01 PROCEDURE — 85025 COMPLETE CBC W/AUTO DIFF WBC: CPT

## 2022-12-01 PROCEDURE — 99203 OFFICE O/P NEW LOW 30 MIN: CPT

## 2022-12-01 RX ORDER — FLUOXETINE 40 MG/1
20 CAPSULE ORAL DAILY
COMMUNITY
Start: 2022-04-26 | End: 2023-02-27

## 2022-12-01 RX ORDER — DEXTROAMPHETAMINE SACCHARATE, AMPHETAMINE ASPARTATE, DEXTROAMPHETAMINE SULFATE AND AMPHETAMINE SULFATE 3.75; 3.75; 3.75; 3.75 MG/1; MG/1; MG/1; MG/1
15 TABLET ORAL DAILY
COMMUNITY
End: 2022-12-01

## 2022-12-01 RX ORDER — PSYLLIUM HUSK 0.4 G
0.52 CAPSULE ORAL 2 TIMES DAILY
COMMUNITY
Start: 2022-04-12 | End: 2022-12-01

## 2022-12-01 RX ORDER — PANTOPRAZOLE SODIUM 40 MG/1
40 TABLET, DELAYED RELEASE ORAL DAILY
COMMUNITY
Start: 2022-06-10 | End: 2022-12-01

## 2022-12-01 RX ORDER — PLECANATIDE 3 MG/1
3 TABLET ORAL DAILY
COMMUNITY
Start: 2022-05-14 | End: 2022-12-01

## 2022-12-01 RX ORDER — HYDROXYZINE HYDROCHLORIDE 25 MG/1
25 TABLET, FILM COATED ORAL
COMMUNITY
Start: 2022-04-26 | End: 2022-12-01

## 2022-12-01 RX ORDER — BUPROPION HYDROCHLORIDE 100 MG/1
100 TABLET, EXTENDED RELEASE ORAL DAILY
Status: ON HOLD | COMMUNITY
Start: 2022-11-19 | End: 2024-01-14

## 2022-12-01 RX ORDER — PROCHLORPERAZINE MALEATE 5 MG
5 TABLET ORAL EVERY 6 HOURS PRN
COMMUNITY
Start: 2022-03-30 | End: 2022-12-01

## 2022-12-01 ASSESSMENT — ANXIETY QUESTIONNAIRES
IF YOU CHECKED OFF ANY PROBLEMS ON THIS QUESTIONNAIRE, HOW DIFFICULT HAVE THESE PROBLEMS MADE IT FOR YOU TO DO YOUR WORK, TAKE CARE OF THINGS AT HOME, OR GET ALONG WITH OTHER PEOPLE: SOMEWHAT DIFFICULT
GAD7 TOTAL SCORE: 15
5. BEING SO RESTLESS THAT IT IS HARD TO SIT STILL: SEVERAL DAYS
3. WORRYING TOO MUCH ABOUT DIFFERENT THINGS: NEARLY EVERY DAY
6. BECOMING EASILY ANNOYED OR IRRITABLE: MORE THAN HALF THE DAYS
1. FEELING NERVOUS, ANXIOUS, OR ON EDGE: NEARLY EVERY DAY
GAD7 TOTAL SCORE: 15
2. NOT BEING ABLE TO STOP OR CONTROL WORRYING: NEARLY EVERY DAY
7. FEELING AFRAID AS IF SOMETHING AWFUL MIGHT HAPPEN: NOT AT ALL

## 2022-12-01 ASSESSMENT — PATIENT HEALTH QUESTIONNAIRE - PHQ9
5. POOR APPETITE OR OVEREATING: NEARLY EVERY DAY
SUM OF ALL RESPONSES TO PHQ QUESTIONS 1-9: 17

## 2022-12-01 NOTE — TELEPHONE ENCOUNTER
She needs to be seen in clinic since she is a new patient.  Miya Toledo can see her at 2:20 or 2:30 today.  OK to double book.      Thanks!  Karina BALDWIN CNM, [facility]  [phone]

## 2022-12-01 NOTE — TELEPHONE ENCOUNTER
LMP 10/24 - 5w3d    2nd pregnancy, had 1  previously  Pt called concerned having mild bleeding starting the evening of , dark red, Not needing to use a regular pad, just small amounts on panty liner and another episode this morning with same color/amount    Cramping yesterday, feels like regular period cramping   *last BM 7 days ago, straining this AM and last night to stool- hx of constipation  pt had stopped home colace d/t being unsure if safe in pregnancy, took milk of mag last night    Informed okay to restart colace, educated on using other stool softeners--miralax, milk of mag, suppositories,     Last intercourse   Educated to push fluids, pelvic rest, stronger bowel regimen  No abnormal nausea or severe abd pain  Reviewed ED precuations      Routing to midwives to advise:  Want to serial BHCG? Seen in office since new to clinic?   Want her to be seen earlier than her scheduled 8 week U/s?     Starr Benjamin RN on 2022 at 9:50 AM    .

## 2022-12-01 NOTE — TELEPHONE ENCOUNTER
Karina Trammell reviewed message/chart and would like to see pt today in clinic at 2:20 for assessment.  Offered appt and accepted by patient.     Starr Benjamin RN on 12/1/2022 at 11:51 AM

## 2022-12-01 NOTE — PROGRESS NOTES
BLEEDING IN PREGNANCY:    JONO Armenta presents for bleeding in early pregnancy.    HPI:  She noticed the bleeding early yesterday when wiping. It first started with light spotting. She then began noticing bilateral, lower abdominal cramping yesterday at 1500. Spotting and cramping are both ongoing today in office.    Constipation - She reported having 7 day episode without BM. Had large BM today, however cramping continues. Reports history of hemorrhoids and issues with constipation.    LMP of:  Patient's last menstrual period was 11/04/2022 (exact date).  At:  5w3d gestation  Started on:  11/23/2022  Onset: rapid (over the course of one day)  Bleeding:  Light volume, dark red in color  Fever: no    Back ache: no  Lightheadedness: no  Foul odor: no  History of ectopic pregnancy, PID, tubal or abdominal surgeries: no    OBJECTIVE  EXAM:    ABDOMEN: Soft without masses. Bilateral tenderness over lower abdomen.  SPECULUM: Blood visualized in vault: yes, dark red in color  CERVIX: Closed  BIMANUAL:  Deferred    ASSESSMENT    ICD-10-CM    1. Vaginal bleeding affecting early pregnancy  O20.9 ABO/Rh type and screen     CBC with Platelets & Differential     HCG quantitative pregnancy     HCG quantitative pregnancy        PLAN   - Labs today as noted above   - Repeat beta HCG on Saturday, pt to schedule lab appointment   - On-Call CNM to contact pt with results   - Encouraged scheduling dating ultrasound for the week of 12/12/22.   - Per pt report, blood type B POS - anticipate that Rhogam is not needed.   - Warning signs reviewed encouraged to call CNM team with questions or concerns.    30 minutes spent on the date of the encounter doing chart review, history and exam, documentation and further activities per the note    Miya Toledo, [facility]  [phone]

## 2022-12-01 NOTE — PATIENT INSTRUCTIONS
Dani Armenta,    It was nice to meet you today.     As a reminder, out plan of care is to have you repeat your pregnancy hormone lab on Saturday, ideally later in the afternoon (around 48 hours from your first blood draw). Our midwife on-call will call you back with results and to make a plan of care. It may take until Sunday to have that lab resulted.    Please seek medical attention immediately if you notice sudden worsening of pain (especially if noticed on one side of your abdomen), your bleeding is saturating a pad every hour, or if you feel as though you may pass out.     Please contact us if you have any questions or concerns.    Take Care,  Miya Toledo, [facility]  [phone]

## 2022-12-03 ENCOUNTER — APPOINTMENT (OUTPATIENT)
Dept: ULTRASOUND IMAGING | Facility: CLINIC | Age: 27
End: 2022-12-03
Attending: STUDENT IN AN ORGANIZED HEALTH CARE EDUCATION/TRAINING PROGRAM
Payer: COMMERCIAL

## 2022-12-03 ENCOUNTER — HOSPITAL ENCOUNTER (EMERGENCY)
Facility: CLINIC | Age: 27
Discharge: HOME | End: 2022-12-04
Attending: EMERGENCY MEDICINE | Admitting: EMERGENCY MEDICINE
Payer: COMMERCIAL

## 2022-12-03 ENCOUNTER — TELEPHONE (OUTPATIENT)
Dept: NURSING | Facility: CLINIC | Age: 27
End: 2022-12-03

## 2022-12-03 ENCOUNTER — LAB (OUTPATIENT)
Dept: LAB | Facility: CLINIC | Age: 27
End: 2022-12-03
Payer: COMMERCIAL

## 2022-12-03 DIAGNOSIS — O00.201 RIGHT OVARIAN PREGNANCY WITHOUT INTRAUTERINE PREGNANCY: Primary | ICD-10-CM

## 2022-12-03 DIAGNOSIS — O20.9 VAGINAL BLEEDING AFFECTING EARLY PREGNANCY: ICD-10-CM

## 2022-12-03 LAB
ABO/RH(D): NORMAL
ANTIBODY SCREEN: NEGATIVE
B-HCG SERPL-ACNC: 702 IU/L (ref 0–5)
BASOPHILS # BLD AUTO: 0 10E3/UL (ref 0–0.2)
BASOPHILS NFR BLD AUTO: 0 %
EOSINOPHIL # BLD AUTO: 0.2 10E3/UL (ref 0–0.7)
EOSINOPHIL NFR BLD AUTO: 3 %
ERYTHROCYTE [DISTWIDTH] IN BLOOD BY AUTOMATED COUNT: 16.5 % (ref 10–15)
HCG INTACT+B SERPL-ACNC: 810 MIU/ML
HCT VFR BLD AUTO: 34.5 % (ref 35–47)
HGB BLD-MCNC: 10.5 G/DL (ref 11.7–15.7)
IMM GRANULOCYTES # BLD: 0 10E3/UL
IMM GRANULOCYTES NFR BLD: 0 %
LYMPHOCYTES # BLD AUTO: 2.5 10E3/UL (ref 0.8–5.3)
LYMPHOCYTES NFR BLD AUTO: 31 %
MCH RBC QN AUTO: 24.2 PG (ref 26.5–33)
MCHC RBC AUTO-ENTMCNC: 30.4 G/DL (ref 31.5–36.5)
MCV RBC AUTO: 80 FL (ref 78–100)
MONOCYTES # BLD AUTO: 0.7 10E3/UL (ref 0–1.3)
MONOCYTES NFR BLD AUTO: 9 %
NEUTROPHILS # BLD AUTO: 4.7 10E3/UL (ref 1.6–8.3)
NEUTROPHILS NFR BLD AUTO: 57 %
NRBC # BLD AUTO: 0 10E3/UL
NRBC BLD AUTO-RTO: 0 /100
PLATELET # BLD AUTO: 360 10E3/UL (ref 150–450)
RBC # BLD AUTO: 4.33 10E6/UL (ref 3.8–5.2)
SPECIMEN EXPIRATION DATE: NORMAL
WBC # BLD AUTO: 8.2 10E3/UL (ref 4–11)

## 2022-12-03 PROCEDURE — 258N000003 HC RX IP 258 OP 636: Performed by: EMERGENCY MEDICINE

## 2022-12-03 PROCEDURE — 99285 EMERGENCY DEPT VISIT HI MDM: CPT | Mod: 25

## 2022-12-03 PROCEDURE — 96361 HYDRATE IV INFUSION ADD-ON: CPT

## 2022-12-03 PROCEDURE — 76801 OB US < 14 WKS SINGLE FETUS: CPT

## 2022-12-03 PROCEDURE — 36415 COLL VENOUS BLD VENIPUNCTURE: CPT | Performed by: STUDENT IN AN ORGANIZED HEALTH CARE EDUCATION/TRAINING PROGRAM

## 2022-12-03 PROCEDURE — 85025 COMPLETE CBC W/AUTO DIFF WBC: CPT | Performed by: STUDENT IN AN ORGANIZED HEALTH CARE EDUCATION/TRAINING PROGRAM

## 2022-12-03 PROCEDURE — 86850 RBC ANTIBODY SCREEN: CPT | Performed by: STUDENT IN AN ORGANIZED HEALTH CARE EDUCATION/TRAINING PROGRAM

## 2022-12-03 PROCEDURE — 86901 BLOOD TYPING SEROLOGIC RH(D): CPT | Performed by: STUDENT IN AN ORGANIZED HEALTH CARE EDUCATION/TRAINING PROGRAM

## 2022-12-03 PROCEDURE — 36415 COLL VENOUS BLD VENIPUNCTURE: CPT

## 2022-12-03 PROCEDURE — 96360 HYDRATION IV INFUSION INIT: CPT

## 2022-12-03 PROCEDURE — 84702 CHORIONIC GONADOTROPIN TEST: CPT

## 2022-12-03 PROCEDURE — 84702 CHORIONIC GONADOTROPIN TEST: CPT | Performed by: STUDENT IN AN ORGANIZED HEALTH CARE EDUCATION/TRAINING PROGRAM

## 2022-12-03 PROCEDURE — 84450 TRANSFERASE (AST) (SGOT): CPT | Performed by: STUDENT IN AN ORGANIZED HEALTH CARE EDUCATION/TRAINING PROGRAM

## 2022-12-03 RX ADMIN — SODIUM CHLORIDE 1000 ML: 9 INJECTION, SOLUTION INTRAVENOUS at 23:51

## 2022-12-03 ASSESSMENT — ACTIVITIES OF DAILY LIVING (ADL): ADLS_ACUITY_SCORE: 35

## 2022-12-03 ASSESSMENT — ENCOUNTER SYMPTOMS
COUGH: 0
SHORTNESS OF BREATH: 0
NAUSEA: 1
FEVER: 0
ABDOMINAL PAIN: 1
VOMITING: 0
DYSURIA: 0

## 2022-12-03 NOTE — TELEPHONE ENCOUNTER
Patient calling to ask if HCG lab results from earlier today are back in. Informed patient that they are still in process and that [facility]    Patient verbalized understanding and had not further questions.    Zoila Iraheta RN  12/03/22 2:00 PM  [facility] Nurse Advisor

## 2022-12-04 VITALS
OXYGEN SATURATION: 97 % | BODY MASS INDEX: 23.54 KG/M2 | DIASTOLIC BLOOD PRESSURE: 71 MMHG | SYSTOLIC BLOOD PRESSURE: 111 MMHG | RESPIRATION RATE: 18 BRPM | HEIGHT: 67 IN | HEART RATE: 67 BPM | WEIGHT: 150 LBS | TEMPERATURE: 97.7 F

## 2022-12-04 DIAGNOSIS — O00.101 RIGHT TUBAL PREGNANCY WITHOUT INTRAUTERINE PREGNANCY: Primary | ICD-10-CM

## 2022-12-04 LAB
ALBUMIN SERPL-MCNC: 3.4 G/DL (ref 3.4–5)
ALBUMIN UR-MCNC: NEGATIVE MG/DL
ALP SERPL-CCNC: 62 U/L (ref 40–150)
ALT SERPL W P-5'-P-CCNC: 20 U/L (ref 0–50)
APPEARANCE UR: CLEAR
AST SERPL W P-5'-P-CCNC: 20 U/L (ref 0–45)
BILIRUB DIRECT SERPL-MCNC: <0.1 MG/DL (ref 0–0.2)
BILIRUB SERPL-MCNC: 0.2 MG/DL (ref 0.2–1.3)
BILIRUB UR QL STRIP: NEGATIVE
COLOR UR AUTO: NORMAL
GLUCOSE UR STRIP-MCNC: NEGATIVE MG/DL
HGB UR QL STRIP: NEGATIVE
KETONES UR STRIP-MCNC: NEGATIVE MG/DL
LEUKOCYTE ESTERASE UR QL STRIP: NEGATIVE
NITRATE UR QL: NEGATIVE
PH UR STRIP: 7 [PH] (ref 5–7)
PROT SERPL-MCNC: 7.3 G/DL (ref 6.8–8.8)
RBC URINE: 0 /HPF
SP GR UR STRIP: 1.02 (ref 1–1.03)
UROBILINOGEN UR STRIP-MCNC: NORMAL MG/DL
WBC URINE: 0 /HPF

## 2022-12-04 PROCEDURE — 81001 URINALYSIS AUTO W/SCOPE: CPT | Performed by: EMERGENCY MEDICINE

## 2022-12-04 PROCEDURE — 250N000011 HC RX IP 250 OP 636: Performed by: STUDENT IN AN ORGANIZED HEALTH CARE EDUCATION/TRAINING PROGRAM

## 2022-12-04 PROCEDURE — 96402 CHEMO HORMON ANTINEOPL SQ/IM: CPT

## 2022-12-04 PROCEDURE — 96372 THER/PROPH/DIAG INJ SC/IM: CPT | Mod: 59

## 2022-12-04 RX ORDER — ONDANSETRON 4 MG/1
4 TABLET, ORALLY DISINTEGRATING ORAL EVERY 8 HOURS PRN
Qty: 20 TABLET | Refills: 0 | Status: SHIPPED | OUTPATIENT
Start: 2022-12-04 | End: 2022-12-11

## 2022-12-04 RX ORDER — OXYCODONE HYDROCHLORIDE 5 MG/1
5 TABLET ORAL EVERY 6 HOURS PRN
Qty: 6 TABLET | Refills: 0 | Status: SHIPPED | OUTPATIENT
Start: 2022-12-04 | End: 2022-12-05

## 2022-12-04 RX ORDER — METHOTREXATE 25 MG/ML
50 INJECTION INTRA-ARTERIAL; INTRAMUSCULAR; INTRATHECAL; INTRAVENOUS ONCE
Status: COMPLETED | OUTPATIENT
Start: 2022-12-04 | End: 2022-12-04

## 2022-12-04 RX ORDER — METHOTREXATE 25 MG/ML
50 INJECTION INTRA-ARTERIAL; INTRAMUSCULAR; INTRATHECAL; INTRAVENOUS ONCE
Status: DISCONTINUED | OUTPATIENT
Start: 2022-12-04 | End: 2022-12-04

## 2022-12-04 RX ADMIN — METHOTREXATE 89.5 MG: 25 SOLUTION INTRA-ARTERIAL; INTRAMUSCULAR; INTRATHECAL; INTRAVENOUS at 02:59

## 2022-12-04 ASSESSMENT — ACTIVITIES OF DAILY LIVING (ADL)
ADLS_ACUITY_SCORE: 35
ADLS_ACUITY_SCORE: 35

## 2022-12-04 NOTE — RESULT ENCOUNTER NOTE
Informed patient of abnormal beta Hcg rise, she currently is experiencing bleeding and cramping 6-8/10, was right sided, per LMP 5w5d, we discussed s/sx of ectopic pregnancy. Plan for patient to present to ED for evaluation. Will need US and beta hcg labs. ED notified of patient coming, name and . MD on call Dr. Watkins also notified.    DENNY Jimenez, LANCEM

## 2022-12-04 NOTE — PROGRESS NOTES
Patient with early pregnancy 5+5 by sure LMP. Had HCG on 12/1 that was 788 and then on 12/3 was 810. Had some slight cramping on 12/1 in the right lower quadrant but then just cramping centrally described by phone to Melly Van CNM as consistent with after her EAB.  Due to some spotting and inappropriately rising quant, Melly called patient to come to ER for eval and r/o ectopic    In the ER patient is stable and not having any pain no has she received any pain medications. Vitals are stable and hgb is 10.5 stable from 11.1 a couple days ago.  U/S reviewed by myself after call from PA in the ER and it shows an empty uterus with trilaminar EMS that was incorrectly measured in just the midline at 2mm but is actually wider than that and likely closer to 6-8mm. The left ovary has a small complex cyst and the right has an adnexal mass that is in largest dimension 3.6cm. there is some moderate free fluid in the pelvis and near the right ovary. There is no fetal pole noted but this is consistent with ectopic pregnancy    Patient is hemodynamically stable and without pain and size of mass and HCG make her an appropriate candidate for methotrexate therapy, though surgical intervention is always reasonable too. Metabolic panel is pending, but barring abnormal liver/kidney functions will plan to proceed with methotrexate at 50mg/m2.  She will then need to come for HCG on Wednesday and Saturday of this coming week, day 4 and 7 and will need to see a 15% decline from day 4 to day 7 in the HCG and then if so, will follow weekly until HCG is zero.  Cautioned, via ABEL Montalvo, to inform patient that initially some swelling can occur with some intial increased pain prior to resolution and if otc pain meds are adequate then can monitor at home. If pain become severe or any other concerning sx she will need to return immediately to the ER

## 2022-12-04 NOTE — ED PROVIDER NOTES
History     Chief Complaint:  Abdominal Pain       HPI   Kathi Enriquez is a G2, P0 27 year old female, approximately 5 weeks 5 days gestation, who presents for evaluation of abdominal pain and vaginal bleeding.  As menstrual period was 10/24/2022.  Patient took a positive pregnancy test on 11/24/2022.  She can have vaginal bleeding on Wednesday, 11/30.  She had an appointment with midwife on Thursday, 12/1.  She reports persistent spotting, not enough to fill a panty liner since that time.  She had a beta-hCG on 12/1 of 788.  She had a repeat hCG today, which was 810.  Due to abnormal hCG elevation in 2 days as well as abdominal pain, she was referred to the ED for further evaluation for ectopic pregnancy.  Patient reports lower abdominal cramping, right greater than left.  She denies radiation of her pain.  She rates it a 2-3/10.  She reports nausea with no vomiting.  Denies fevers, cough, rhinorrhea.  Denies concern for sexually transmitted infection.  Denies dysuria.       Review of Systems   Constitutional: Negative for fever.   Respiratory: Negative for cough and shortness of breath.    Gastrointestinal: Positive for abdominal pain and nausea. Negative for vomiting.   Genitourinary: Positive for vaginal bleeding. Negative for dysuria.   All other systems reviewed and negative    Allergies:  Nickel     Medications:    ondansetron (ZOFRAN ODT) 4 MG ODT tab  oxyCODONE (ROXICODONE) 5 MG tablet  buPROPion (WELLBUTRIN SR) 100 MG 12 hr tablet  FLUoxetine (PROZAC) 40 MG capsule        Past Medical History:    Past Medical History:   Diagnosis Date     Anxiety      Deliberate self-cutting      Insomnia      Major depressive disorder, recurrent episode, moderate (H) 2/20/2016     Panic        Past Surgical History:    None    Family History:    family history includes Asthma in her sister; Blood Disease in her sister; Cancer in her father, maternal grandfather, paternal grandfather, and paternal grandmother; Cancer  "- colorectal in her maternal grandfather; Diabetes in her father.    Social History:  Presents with her mother.  Patient formerly vaped, stopped when she found out she was pregnant.    Physical Exam     Patient Vitals for the past 24 hrs:   BP Temp Temp src Pulse Resp SpO2 Height Weight   12/03/22 2349 109/72 -- -- 75 -- 97 % -- --   12/03/22 2125 129/81 97.7  F (36.5  C) Oral 90 18 99 % 1.702 m (5' 7\") 68 kg (150 lb)        Physical Exam  Vitals: Reviewed, as above.   General: Alert and oriented, in mild distress. Resting on bed.  Tearful.  Skin: Warm and well-perfused. No rashes, lesions, or erythema.   HEENT:   Head: Normocephalic, atraumatic. Facial features symmetric.   Eyes: Conjunctiva pink, sclera white. EOMs grossly intact.   Ears: Auricles without lesion, erythema, or edema.   Nose: Symmetric with no discharge.  Mouth and throat: Lips are moist with no chapping, lesions, or edema, Buccal mucosa is pink and moist without lesions.  Neck: Supple with no lymphadenopathy. Full ROM.   Pulmonary: Chest wall expansion symmetric with no increased work of breathing. Lungs clear to auscultation bilaterally.   Cardiovascular: Heart RRR with no murmurs. 2+ radial and tibialis posterior pulses bilaterally. No peripheral edema.  Abdominal: No hernias or distension. Bowel sounds present and physiologic. Abdomen is soft.  Mild tenderness to palpation bilateral lower quadrants, right greater than left.  No rebound or guarding. No masses or organomegaly.  Uterus is nonpalpable.  Musculoskeletal: Moves all extremities spontaneously.  Psych: Affect appropriate.  Answers questions appropriately. Patient appears calm.      Emergency Department Course     Results for orders placed or performed during the hospital encounter of 12/03/22 (from the past 24 hour(s))   CBC with platelets differential    Narrative    The following orders were created for panel order CBC with platelets differential.  Procedure                             "   Abnormality         Status                     ---------                               -----------         ------                     CBC with platelets and d...[[id number]]  Abnormal            Final result                 Please view results for these tests on the individual orders.   ABO/Rh type and screen    Narrative    The following orders were created for panel order ABO/Rh type and screen.  Procedure                               Abnormality         Status                     ---------                               -----------         ------                     Adult Type and Screen[[id number]]                            Final result                 Please view results for these tests on the individual orders.   HCG quantitative pregnancy   Result Value Ref Range    hCG Quantitative 702 (H) 0 - 5 IU/L   CBC with platelets and differential   Result Value Ref Range    WBC Count 8.2 4.0 - 11.0 10e3/uL    RBC Count 4.33 3.80 - 5.20 10e6/uL    Hemoglobin 10.5 (L) 11.7 - 15.7 g/dL    Hematocrit 34.5 (L) 35.0 - 47.0 %    MCV 80 78 - 100 fL    MCH 24.2 (L) 26.5 - 33.0 pg    MCHC 30.4 (L) 31.5 - 36.5 g/dL    RDW 16.5 (H) 10.0 - 15.0 %    Platelet Count 360 150 - 450 10e3/uL    % Neutrophils 57 %    % Lymphocytes 31 %    % Monocytes 9 %    % Eosinophils 3 %    % Basophils 0 %    % Immature Granulocytes 0 %    NRBCs per 100 WBC 0 <1 /100    Absolute Neutrophils 4.7 1.6 - 8.3 10e3/uL    Absolute Lymphocytes 2.5 0.8 - 5.3 10e3/uL    Absolute Monocytes 0.7 0.0 - 1.3 10e3/uL    Absolute Eosinophils 0.2 0.0 - 0.7 10e3/uL    Absolute Basophils 0.0 0.0 - 0.2 10e3/uL    Absolute Immature Granulocytes 0.0 <=0.4 10e3/uL    Absolute NRBCs 0.0 10e3/uL   Adult Type and Screen   Result Value Ref Range    ABO/RH(D) A POS     Antibody Screen Negative Negative    SPECIMEN EXPIRATION DATE [id number]    Hepatic panel   Result Value Ref Range    Bilirubin Total 0.2 0.2 - 1.3 mg/dL    Bilirubin Direct <0.1 0.0 - 0.2 mg/dL    Protein  Total 7.3 6.8 - 8.8 g/dL    Albumin 3.4 3.4 - 5.0 g/dL    Alkaline Phosphatase 62 40 - 150 U/L    AST 20 0 - 45 U/L    ALT 20 0 - 50 U/L   US OB <14 Weeks W Transvaginal    Narrative    EXAM: US OB <14 WEEKS WITH TRANSVAGINAL SINGLE  LOCATION: [facility]  DATE/TIME: 12/3/2022 10:49 PM    INDICATION: vaginal bleeding and cramping, early pregnancy  COMPARISON: None.  TECHNIQUE: Transabdominal scans were performed. Endovaginal ultrasound was performed to better visualize the embryo.    FINDINGS:  UTERUS: There is no intrauterine gestational sac. The endometrium measures 2 mm in thickness.    RIGHT OVARY: The right ovary is normal in appearance measuring 3.2 x 3.2 x 2.2 cm. There is a complex mass medial to the ovary measuring 4.1 x 3.6 x 2.0 cm.  LEFT OVARY: 1.8 cm complex cyst.    Small amount clear free pelvic fluid.      Impression    IMPRESSION:     1. There is no evidence of an intrauterine pregnancy.  2. 4.1 cm complex right adnexal mass. Ectopic pregnancy is a possibility.    Critical Result: Possible ectopic pregnancy.    Finding was identified on 12/3/2022 10:56 PM.    Dr. Montalvo was contacted by me on 12/3/2022 10:56 PM and verbalized understanding of the critical result.      UA with Microscopic reflex to Culture    Specimen: Urine, Midstream   Result Value Ref Range    Color Urine Light Yellow Colorless, Straw, Light Yellow, Yellow    Appearance Urine Clear Clear    Glucose Urine Negative Negative mg/dL    Bilirubin Urine Negative Negative    Ketones Urine Negative Negative mg/dL    Specific Gravity Urine 1.019 1.003 - 1.035    Blood Urine Negative Negative    pH Urine 7.0 5.0 - 7.0    Protein Albumin Urine Negative Negative mg/dL    Urobilinogen Urine Normal Normal, 2.0 mg/dL    Nitrite Urine Negative Negative    Leukocyte Esterase Urine Negative Negative    RBC Urine 0 <=2 /HPF    WBC Urine 0 <=5 /HPF    Narrative    Urine Culture not indicated             Emergency Department  Course:    Reviewed:  I reviewed nursing notes, vitals, and past medical history    Assessments:   I obtained history and examined the patient as noted above.    I rechecked the patient and explained findings.   I rechecked the patient and explained findings.    Consults:  6818 I spoke with Dr. Small, radiologist, regarding the patient's imaging.  He reported a 4 cm right adnexal mass, concerning for ectopic pregnancy.  0024 I spoke with Dr. Watkins, OB/Gyn, regarding the patient. She recommends medical management with methotrexate and outpatient follow-up.    Interventions:  Medications   methotrexate sodium (pres-free) injection 89.5 mg (has no administration in time range)   0.9% sodium chloride BOLUS (1,000 mLs Intravenous New Bag 12/3/22 6151)        Disposition:  Care of the patient was transferred to my colleague Dr. Rendon pending Methotrexate administration.     Impression & Plan      Medical Decision Making:  Kathi Enriquez is a G2, P0 27 year old female, approximately 5 weeks 5 days gestation, who presents for evaluation of abdominal pain and vaginal bleeding.  Please see HPI and physical exam for full details.  Differential diagnosis includes ectopic pregnancy, spontaneous miscarriage, heterotopic pregnancy, ovarian cyst, ovarian torsion, among others.  Patient has a reassuring physical exam with normal vitals.  Laboratory evaluation is concerning for falling transvaginal ultrasound reveals hCG in setting of early pregnancy.  Hemoglobin is stable.  Transvaginal ultrasound reveals 4 cm right adnexal mass concerning for ectopic pregnancy without the appearance of hemorrhage or rupture.  There is no sign of intrauterine pregnancy.  I discussed with Dr. Watkins, OB/GYN, who recommends medical management with methotrexate and outpatient follow-up.  This process was initiated in the ED.  We discussed outpatient management of symptoms with Tylenol, ibuprofen, and I provided prescriptions for Zofran and  oxycodone as an outpatient.  Return precautions are also discussed, including acute severe worsening of her pain, lightheadedness, fevers, intractable vomiting, or other troubling symptoms.  Care of the patient was signed out to my colleague, Dr. Rendon, pending administration of methotrexate.    Critical Care time:  was 0 minutes for this patient excluding procedures.    Diagnosis:    ICD-10-CM    1. Right ovarian pregnancy without intrauterine pregnancy  O00.201            Discharge Medications:  New Prescriptions    ONDANSETRON (ZOFRAN ODT) 4 MG ODT TAB    Take 1 tablet (4 mg) by mouth every 8 hours as needed for nausea    OXYCODONE (ROXICODONE) 5 MG TABLET    Take 1 tablet (5 mg) by mouth every 6 hours as needed for severe pain (7-10)           Katina Montalvo PA-C  12/04/22 0134

## 2022-12-04 NOTE — ED PROVIDER NOTES
"ED ATTENDING PHYSICIAN NOTE:   I evaluated this patient in conjunction with ABEL Reid. I have participated in the care of the patient and personally performed key elements of the history, exam, and medical decision making.      HPI:   Kathi Enriquez is a 27 year old female abdominal pain. The patient had a confirmed pregnant on 11/24. On 11/30, she had onset of vaginal bleeding. She had blood work done on 12/01. The patient states she has had right-sided abdominal cramping for the past 3 days. She was seen at urgent care earlier today and was sent to the emergency department for further evaluation for possible ectopic pregnancy.        EXAM:   /72   Pulse 75   Temp 97.7  F (36.5  C) (Oral)   Resp 18   Ht 1.702 m (5' 7\")   Wt 68 kg (150 lb)   LMP 10/24/2022 (Exact Date)   SpO2 97%   BMI 23.49 kg/m    General: Resting comfortably on the gurney  Head:  The scalp, face, and head appear normal  Eyes:  The pupils are normal    Conjunctivae and sclera appear normal  ENT:    The nose is normal    Ears/pinnae are normal  Neck:  Normal range of motion  Resp:  The lungs are clear. No resp distress.  Heart:  Regular rate, normal rhythm, no murmur  Abd:  Right of midline suprapubic abd pain.  No guarding or peritoneal signs.    Skin:  No rash or acute lesions noted.  Neuro: Speech is normal and fluent.  No focal deficits.  Psych:  Awake. Alert.  Normal affect.  Appropriate interactions    Labs Ordered and Resulted from Time of ED Arrival to Time of ED Departure   HCG QUANTITATIVE PREGNANCY - Abnormal       Result Value    hCG Quantitative 702 (*)    CBC WITH PLATELETS AND DIFFERENTIAL - Abnormal    WBC Count 8.2      RBC Count 4.33      Hemoglobin 10.5 (*)     Hematocrit 34.5 (*)     MCV 80      MCH 24.2 (*)     MCHC 30.4 (*)     RDW 16.5 (*)     Platelet Count 360      % Neutrophils 57      % Lymphocytes 31      % Monocytes 9      % Eosinophils 3      % Basophils 0      % Immature Granulocytes 0      " NRBCs per 100 WBC 0      Absolute Neutrophils 4.7      Absolute Lymphocytes 2.5      Absolute Monocytes 0.7      Absolute Eosinophils 0.2      Absolute Basophils 0.0      Absolute Immature Granulocytes 0.0      Absolute NRBCs 0.0     ROUTINE UA WITH MICROSCOPIC REFLEX TO CULTURE - Normal    Color Urine Light Yellow      Appearance Urine Clear      Glucose Urine Negative      Bilirubin Urine Negative      Ketones Urine Negative      Specific Gravity Urine 1.019      Blood Urine Negative      pH Urine 7.0      Protein Albumin Urine Negative      Urobilinogen Urine Normal      Nitrite Urine Negative      Leukocyte Esterase Urine Negative      RBC Urine 0      WBC Urine 0     HEPATIC FUNCTION PANEL - Normal    Bilirubin Total 0.2      Bilirubin Direct <0.1      Protein Total 7.3      Albumin 3.4      Alkaline Phosphatase 62      AST 20      ALT 20     TYPE AND SCREEN, ADULT    ABO/RH(D) A POS      Antibody Screen Negative      SPECIMEN EXPIRATION DATE [id number]     ABO/RH TYPE AND SCREEN     US OB <14 Weeks W Transvaginal   Final Result   IMPRESSION:       1. There is no evidence of an intrauterine pregnancy.   2. 4.1 cm complex right adnexal mass. Ectopic pregnancy is a possibility.      Critical Result: Possible ectopic pregnancy.      Finding was identified on 12/3/2022 10:56 PM.      Dr. Montalvo was contacted by me on 12/3/2022 10:56 PM and verbalized understanding of the critical result.            MEDICAL DECISION MAKING/ASSESSMENT AND PLAN:   Patient is a 27-year-old female  who presents with right-sided lower abdominal pain in the setting of early pregnancy.  Beta-hCG appears to be decreasing in comparison with lab value from several days prior.  Ultrasounds not show evidence of intrauterine pregnancy but there is a 4.1 cm complex right adnexal mass concerning for ectopic pregnancy.  In discussion with OB/GYN I did feel that this would represent an ectopic pregnancy and recommended administration of  methotrexate.  Orders placed for methotrexate administration.  Dr. Watkins will follow up with the patient for repeat beta hCG testing and follow-up from [facility]'s ED visit.  Patient was given strict ectopic return precautions particularly if pain were to worsen, fever or vomiting were developed, or any new concerning symptom onset occurs.  Nausea and pain control medications were provided for the patient as well as education surrounding expected course.  After all questions answered return precautions understood, discharged home.      DIAGNOSIS:     ICD-10-CM    1. Right ovarian pregnancy without intrauterine pregnancy  O00.201           DISPOSITION:   Discharged to home.      12/3/2022  [facility] EMERGENCY DEPT     Robby Yuen MD  12/04/22 0141

## 2022-12-05 ENCOUNTER — TELEPHONE (OUTPATIENT)
Dept: OBGYN | Facility: CLINIC | Age: 27
End: 2022-12-05

## 2022-12-05 DIAGNOSIS — O00.101 RIGHT TUBAL PREGNANCY WITHOUT INTRAUTERINE PREGNANCY: Primary | ICD-10-CM

## 2022-12-05 RX ORDER — OXYCODONE HYDROCHLORIDE 5 MG/1
5 TABLET ORAL EVERY 6 HOURS PRN
Qty: 12 TABLET | Refills: 0 | Status: SHIPPED | OUTPATIENT
Start: 2022-12-05 | End: 2022-12-15

## 2022-12-05 NOTE — TELEPHONE ENCOUNTER
Sent in more but have her add 600-800 mg every 6 hours of advil/ibuprofen to the tylenol at baseline so then not as much oxy needed. The nsaids will work much better than just tylenol

## 2022-12-05 NOTE — TELEPHONE ENCOUNTER
Pt instructed on adding ibuprofen along w tylenol. Additional oxy was sent for severe pain but warning can cause constipation.    Pt verbalized understanding, in agreement with plan, and voiced no further questions.  Nadine Cool RN on 12/5/2022 at 12:49 PM

## 2022-12-05 NOTE — TELEPHONE ENCOUNTER
12/3/22 ED visit:MEDICAL DECISION MAKING/ASSESSMENT AND PLAN:   Patient is a 27-year-old female  who presents with right-sided lower abdominal pain in the setting of early pregnancy.  Beta-hCG appears to be decreasing in comparison with lab value from several days prior.  Ultrasounds not show evidence of intrauterine pregnancy but there is a 4.1 cm complex right adnexal mass concerning for ectopic pregnancy.  In discussion with OB/GYN I did feel that this would represent an ectopic pregnancy and recommended administration of methotrexate.  Orders placed for methotrexate administration.  Dr. Watkins will follow up with the patient for repeat beta hCG testing and follow-up from [facility]'s ED visit.  Patient was given strict ectopic return precautions particularly if pain were to worsen, fever or vomiting were developed, or any new concerning symptom onset occurs.  Nausea and pain control medications were provided for the patient as well as education surrounding expected course.  After all questions answered return precautions understood, discharged home.       Patient currently on oxycodone 5mg and 2 tylenol ES every 6hours. Currently is helping with discomfort and pain is not severe. however is wanting a refill on oxycodone so does not go without oxycodone.   Scheduled for follow up labs  and 12/10.    Routing to  to review and advise.    Fanny Thao RN

## 2022-12-07 ENCOUNTER — LAB (OUTPATIENT)
Dept: LAB | Facility: CLINIC | Age: 27
End: 2022-12-07
Payer: COMMERCIAL

## 2022-12-07 DIAGNOSIS — O00.101 RIGHT TUBAL PREGNANCY WITHOUT INTRAUTERINE PREGNANCY: ICD-10-CM

## 2022-12-07 LAB — HCG INTACT+B SERPL-ACNC: 1033 MIU/ML

## 2022-12-07 PROCEDURE — 36415 COLL VENOUS BLD VENIPUNCTURE: CPT

## 2022-12-07 PROCEDURE — 84702 CHORIONIC GONADOTROPIN TEST: CPT

## 2022-12-08 ENCOUNTER — MYC MEDICAL ADVICE (OUTPATIENT)
Dept: MIDWIFE SERVICES | Facility: CLINIC | Age: 27
End: 2022-12-08

## 2022-12-08 ENCOUNTER — NURSE TRIAGE (OUTPATIENT)
Dept: NURSING | Facility: CLINIC | Age: 27
End: 2022-12-08

## 2022-12-08 DIAGNOSIS — O00.201 ECTOPIC PREGNANCY OF RIGHT OVARY: Primary | ICD-10-CM

## 2022-12-08 NOTE — TELEPHONE ENCOUNTER
Routing pt [facility] [employer] message to provider to advise.    Nadine Cool RN on 12/8/2022 at 3:48 PM

## 2022-12-08 NOTE — TELEPHONE ENCOUNTER
Nurse Triage SBAR    Is this a 2nd Level Triage? YES, LICENSED PRACTITIONER REVIEW IS REQUIRED    Situation: Patient calling to inquire on hcg level following methotrexate.    Background: :Patient was given methotrexate on 12/3/2022 due to ectopic pregnancy.    Assessment: Patient was 6 weeks pregnant.  Patient noted hcg level increased from 702 to 1033 4 days following methotrexate.  She reports constant pain 4/10.  She reports vaginal bleeding requiring changing fransisca pad twice per day.      Protocol Recommended Disposition:   See HCP Within 4 Hours (Or PCP Triage)    Paged to provider: on-call provider, Dr Watkins at [phone]  MD Recommendations:  -Continue to care for pain at home as long as pain is not increasing  -Patient should schedule lab for Saturday with the hospital lab  -Patient's Saturday lab should decrease 15% from 22 lab.    Provider Recommendation Follow Up:   Reached patient/caregiver. Informed of provider's recommendations. Patient verbalized understanding and agrees with the plan.     Jacqueline Ly RN  22 1:11 AM  [facility] Nurse Advisor    Reason for Disposition    [1] Constant abdominal pain AND [2] present > 2 hours AND [3] occurring more than 24 hours after taking  pill(s)    Additional Information    Negative: Shock suspected (e.g., cold/pale/clammy skin, too weak to stand, low BP, rapid pulse)    Negative: Sounds like a life-threatening emergency to the triager    Negative: [1] Diagnosed with a threatened miscarriage or threatened  AND [2] did not take medicines to induce  (medication )    Negative: [1] SEVERE abdominal pain AND [2] starting more than 24 hours after taking  pill(s) (i.e., misoprostol on Day 2 or 3)    Negative: SEVERE vaginal bleeding (e.g., soaking 2 pads or tampons per hour and present 2 or more hours; 1 menstrual cup every 2 hours)    Negative: MODERATE vaginal bleeding (e.g., soaking 1 pad or tampon per hour  and present > 6 hours; 1 menstrual cup every 6 hours)    Negative: Patient sounds very sick or weak to the triager    Protocols used:  - MEDICATION INDUCED FOLLOW-UP CALL-A-AH

## 2022-12-08 NOTE — TELEPHONE ENCOUNTER
Follow-up/recommendations post ectopic pregnancy  Routing pt mychart message to provider to advise.    Component Ref Range & Units 1 d ago 5 d ago 7 d ago     hCG Quantitative <5 mIU/mL 1,033 High   810 High  CM  788 High      Patient is hemodynamically stable and without pain and size of mass and HCG make her an appropriate candidate for methotrexate therapy, though surgical intervention is always reasonable too. Metabolic panel is pending, but barring abnormal liver/kidney functions will plan to proceed with methotrexate at 50mg/m2.  She will then need to come for HCG on Wednesday and Saturday of this coming week, day 4 and 7 and will need to see a 15% decline from day 4 to day 7 in the HCG and then if so, will follow weekly until HCG is zero.  June Ng RN on 12/8/2022 at 3:44 PM

## 2022-12-08 NOTE — RESULT ENCOUNTER NOTE
Kathi,    I know you spoke to the nurse overnight about the HCG level going up. I apologize that I didn't get back to you sooner on it. I checked labs at about 8 pm and of course this came back a half hour later.    So as the nurse probably told you, it's not unexpected that it went up a little. The first few days after the methotrexate the tissue can swell some and not yet be seeing the effect of the shot, and now these next few days is when it should start to hopefully decline. We want to see it drop at least 15% between yesterday and Saturday. So into the 800's or so would be what we'd want to see. As long as your pain isn't getting worse and the pain medicine is working and the HCG level is trending the way we'd expect, then we would probably just have your do weekly HCG levels once a week until we get fully to zero.    Hopefully the FNA nurse helped you to figure out how to schedule the appointment at the hospital lab, or a [place] urgent care lab that's open on saturdays, to get the next one done, ideally early in the morning so we can get the result back earlier in the day. Dr. Becker is on call Saturday and is aware to keep her eyes open for the result and to connect with you.    Carmen Watkins MD

## 2022-12-08 NOTE — TELEPHONE ENCOUNTER
Responded via mychart to patient inquiry regarding follow-up for ectopic pregnancy and concern for fallopian tube scarring. Discussed potential for HSG 3 months after ectopic is resolved. Additionally to consider STI testing and other routine blood work at that time.     Plan to monitor HCG levels early in next pregnancy to ensure that pregnancy has implanted in the correct place.     Reinforced importance of continued HCG monitoring until ectopic is resolved including need to keep Saturday lab appointment and to call with warning signs.    Miya Toledo, APRN [facility]  [phone]

## 2022-12-09 NOTE — TELEPHONE ENCOUNTER
Reiterated plan for repeat beta and close monitoring of symptoms, if anything changes patient needs to be seen ASAP due to size of mass. Has repeat beta tomorrow. The HSG recommended at 3 months due to sometimes residual inflammation from ectopic pregnancy. Offered to discuss via phone today as well, MDs and CNMs aware of patient ectopic and plan of care for close monitoring with beta hcg.    DENNY Jimenez, CNM

## 2022-12-09 NOTE — TELEPHONE ENCOUNTER
Routing pt [employer] message to provider to advise.  Bhupendra Ng RN on 12/9/2022 at 8:25 AM

## 2022-12-10 ENCOUNTER — LAB (OUTPATIENT)
Dept: LAB | Facility: CLINIC | Age: 27
End: 2022-12-10
Payer: COMMERCIAL

## 2022-12-10 DIAGNOSIS — O00.101 RIGHT TUBAL PREGNANCY WITHOUT INTRAUTERINE PREGNANCY: ICD-10-CM

## 2022-12-10 LAB — B-HCG SERPL-ACNC: 746 IU/L (ref 0–5)

## 2022-12-10 PROCEDURE — 36415 COLL VENOUS BLD VENIPUNCTURE: CPT

## 2022-12-10 PROCEDURE — 84702 CHORIONIC GONADOTROPIN TEST: CPT

## 2022-12-11 NOTE — RESULT ENCOUNTER NOTE
Kathi,    I assume you connected with Dr. Becker about your HCG level yesterday.    It has dropped the 15% we needed it to. We now have to make sure it continues to fall. Since having it come back on saturdays with different people on call isn't ideal, we should have you do it in our office on Thursdays, Friday in the mornings first thing as a second choice. We have to keep doing it weekly until it gets to zero to make sure it does. Otherwise we sometimes have to give you more methotrexate or even still do surgery, neither of which will hopefully be needed.    Carmen Watkins MD

## 2022-12-12 NOTE — TELEPHONE ENCOUNTER
Pt wondering if she can combine her lab appt and visit with Dr Watkins both this Thursday, currently coming on Tuesday for labs  Routing pt mychart message to provider to advise.  Bhupendra Ng RN on 12/12/2022 at 11:01 AM

## 2022-12-13 ENCOUNTER — LAB (OUTPATIENT)
Dept: LAB | Facility: CLINIC | Age: 27
End: 2022-12-13
Payer: COMMERCIAL

## 2022-12-13 DIAGNOSIS — O00.101 RIGHT TUBAL PREGNANCY WITHOUT INTRAUTERINE PREGNANCY: ICD-10-CM

## 2022-12-13 LAB — B-HCG SERPL-ACNC: 278 IU/L (ref 0–5)

## 2022-12-13 PROCEDURE — 36415 COLL VENOUS BLD VENIPUNCTURE: CPT

## 2022-12-13 PROCEDURE — 84702 CHORIONIC GONADOTROPIN TEST: CPT

## 2022-12-13 NOTE — RESULT ENCOUNTER NOTE
Kathi,    Despite it being much less than a week since your last HCG it has dropped quite a good amount, which is great.  Let's keep doing them on Tuesdays going forward, which is even better than doing Thursday/Friday b/c then more of the work week to make sure we follow up with you in a timely fashion vs close to a weekend.  Hopefully you're feeling ok/better as the level continues to drop off a good amount.    Carmen Watkins MD

## 2022-12-15 ENCOUNTER — OFFICE VISIT (OUTPATIENT)
Dept: MIDWIFE SERVICES | Facility: CLINIC | Age: 27
End: 2022-12-15
Payer: COMMERCIAL

## 2022-12-15 VITALS
BODY MASS INDEX: 23.49 KG/M2 | WEIGHT: 149.7 LBS | SYSTOLIC BLOOD PRESSURE: 110 MMHG | DIASTOLIC BLOOD PRESSURE: 60 MMHG | HEIGHT: 67 IN

## 2022-12-15 DIAGNOSIS — O00.90 ECTOPIC PREGNANCY WITHOUT INTRAUTERINE PREGNANCY, UNSPECIFIED LOCATION: Primary | ICD-10-CM

## 2022-12-15 DIAGNOSIS — R79.89 ELEVATED SERUM HCG: ICD-10-CM

## 2022-12-15 PROCEDURE — 99215 OFFICE O/P EST HI 40 MIN: CPT | Performed by: NURSE PRACTITIONER

## 2022-12-15 NOTE — PROGRESS NOTES
SUBJECTIVE:                                                   Kathi Enriquez is a 27 year old who presents to clinic today for the following health issue(s):  Patient presents with:  Follow Up      HPI:  Kathi presents today with partner for f/u s/p Methotrexate for ectopic pregnancy given 12/3.  She was evaluated in the clinic on  for spotting/cramping, went to ER on 12/3 for continued cramping, spotting and irregular increase in HCG from 788-810 in 48 hrs.  Since being in the ER and having Methotrexate, she states that she is still having vaginal bleeding and some cramping, cramping is controlled with OTC Tylenol and ibuprofen.  She is tearful today, asking for further discussion on ectopic pregnancy, methotrexate and future planning.      Patient's last menstrual period was 2022 (exact date).  Patient is sexually active  .  Using none for contraception.   Health maintenance updated:  yes    Last PHQ-9 score on record =   PHQ-9 SCORE 2022   PHQ-9 Total Score -   PHQ-9 Total Score MyChart -   PHQ-9 Total Score 17     Last GAD7 score on record =   ASHLEY-7 SCORE 2022   Total Score -   Total Score 15       Problem list and histories reviewed & adjusted, as indicated.  Additional history: as documented.    Patient Active Problem List   Diagnosis     Anxiety     Major depressive disorder, recurrent episode, moderate (H)     Ectopic pregnancy without intrauterine pregnancy, unspecified location     History reviewed. No pertinent surgical history.   Social History     Tobacco Use     Smoking status: Every Day     Packs/day: 0.25     Types: Cigarettes     Smokeless tobacco: Never     Tobacco comments:     smokes 2 or 3 a day    Substance Use Topics     Alcohol use: Yes      Problem (# of Occurrences) Relation (Name,Age of Onset)    Asthma (1) Sister (Nohemy)    Blood Disease (1) Sister (Nohemy): Blood Clots in the lung    Cancer (4) Paternal Grandmother: Lymphoma, Paternal Grandfather:  "Pancreatic, Father: Melanoma, Maternal Grandfather: Brain Tumor, Bladder Cancer    Diabetes (1) Father: Type II Diabetes    Cancer - colorectal (1) Maternal Grandfather            Current Outpatient Medications   Medication Sig     buPROPion (WELLBUTRIN SR) 100 MG 12 hr tablet Take 100 mg by mouth daily     FLUoxetine (PROZAC) 40 MG capsule Take 20 mg by mouth daily     No current facility-administered medications for this visit.     Allergies   Allergen Reactions     Nickel Itching, Rash and Swelling       ROS:  12 point review of systems negative other than symptoms noted below or in the HPI.  Genitourinary: Cramps and VB    OBJECTIVE:     /60   Ht 1.702 m (5' 7\")   Wt 67.9 kg (149 lb 11.2 oz)   LMP 11/04/2022 (Exact Date)   BMI 23.45 kg/m    Body mass index is 23.45 kg/m .    PHYSICAL EXAM:  Constitutional:  Appearance: Well nourished, well developed alert, in no acute distress  Neurologic:  Mental Status:  Oriented X3.  Normal strength and tone, sensory exam grossly normal, mentation intact and speech normal.    Psychiatric:  Mentation appears normal and affect normal/bright.  No Pelvic Exam performed     HCG results:  12/1 788  12/3 810  12/7 1033  12/10 746  12/13 278    ASSESSMENT/PLAN:                                                        ICD-10-CM    1. Ectopic pregnancy without intrauterine pregnancy, unspecified location  O00.90       2. Elevated serum hCG  E34.9           COUNSELING:  -Discussed ectopic pregnancy and use of methotrexate per ACOG patient education  -Discussed importance of continuing with weekly HCG until <5  -Discussed recommendation of f/u appt with OBGYN to discuss continued POC  -pregnancy loss PPSM references given  -return to clinic per OB recommendation  -discussed danger signs, signs and symptoms infection, when to call clinic    40 minutes spent on the date of the encounter doing chart review, review of test results, patient visit, documentation and discussion with " other provider(s)     Karina BALDWIN, JOHN, [facility]  [phone]

## 2022-12-19 ENCOUNTER — MYC MEDICAL ADVICE (OUTPATIENT)
Dept: MIDWIFE SERVICES | Facility: CLINIC | Age: 27
End: 2022-12-19

## 2022-12-19 ENCOUNTER — TELEPHONE (OUTPATIENT)
Dept: MIDWIFE SERVICES | Facility: CLINIC | Age: 27
End: 2022-12-19

## 2022-12-19 NOTE — TELEPHONE ENCOUNTER
Type of Paperwork received:  fmla     Date Rcvd:  12/19/2022    Rcvd From (Company name): [employer]    Provider:  Midwives    Placed on Provider Cart Date:  12/19/2022

## 2022-12-20 NOTE — TELEPHONE ENCOUNTER
Dani Armenta,    We have filled out your forms. Someone from the clinic will the sending through your completed paperwork.    Please let us know if there is anything else we can do for you.    Take Care,    Miya Toledo, DENNY [facility]  [phone]

## 2022-12-21 ENCOUNTER — MYC MEDICAL ADVICE (OUTPATIENT)
Dept: MIDWIFE SERVICES | Facility: CLINIC | Age: 27
End: 2022-12-21

## 2022-12-21 NOTE — TELEPHONE ENCOUNTER
Ongoing right sided discomfort.  Falling HCGs  Routing pt mychart message to provider to advise.  Bhupendra Ng RN on 12/21/2022 at 9:10 AM

## 2022-12-21 NOTE — PROGRESS NOTES
"  SUBJECTIVE:                                                   Kathi Enriquez is a 27 year old who presents to clinic today for the following health issue(s):  Patient presents with:  Follow Up    HPI:  Kathi brings questions regarding residual pelvic pain. She notes right-sided pain, pressure, and discomfort to lower abdomen. Notes that pain has been continuous since ectopic pregnancy noted on 2022. She has been taking advil regularly, for management. Pain maintains at a 3-4/10, just a nagging sensation that is starting to bother her. Also notes that it is uncomfortable/off feeling urinating, like \"pressure\" to go to the bathroom.     Pt states that she is not sleeping well. Her anxiety symptoms are persistent. She has tried melatonin and other OTC sleep medications without success. She has a history of having negative side effects with prescription sleep medications; managed per her psychologist.    Patient's last menstrual period was 2022 (exact date).  .    Last PHQ-9 score on record =   PHQ-9 SCORE 2022   PHQ-9 Total Score -   PHQ-9 Total Score MyChart -   PHQ-9 Total Score 17     Last GAD7 score on record =   ASHLEY-7 SCORE 2022   Total Score -   Total Score 15     Problem list and histories reviewed & adjusted, as indicated.  Additional history: as documented.    Patient Active Problem List   Diagnosis     Anxiety     Major depressive disorder, recurrent episode, moderate (H)     Ectopic pregnancy without intrauterine pregnancy, unspecified location     History reviewed. No pertinent surgical history.   Social History     Tobacco Use     Smoking status: Every Day     Packs/day: 0.25     Types: Cigarettes     Smokeless tobacco: Never     Tobacco comments:     smokes 2 or 3 a day    Substance Use Topics     Alcohol use: Yes      Problem (# of Occurrences) Relation (Name,Age of Onset)    Asthma (1) Sister (Nohemy)    Blood Disease (1) Sister (Nohemy): Blood Clots in the lung    " Cancer (4) Paternal Grandmother: Lymphoma, Paternal Grandfather: Pancreatic, Father: Melanoma, Maternal Grandfather: Brain Tumor, Bladder Cancer    Diabetes (1) Father: Type II Diabetes    Cancer - colorectal (1) Maternal Grandfather            Current Outpatient Medications   Medication Sig     buPROPion (WELLBUTRIN SR) 100 MG 12 hr tablet Take 100 mg by mouth daily     FLUoxetine (PROZAC) 40 MG capsule Take 20 mg by mouth daily     ALPRAZolam (XANAX) 0.25 MG tablet      No current facility-administered medications for this visit.     Allergies   Allergen Reactions     Nickel Itching, Rash and Swelling       ROS:  12 point review of systems negative other than symptoms noted below.    OBJECTIVE:     /64   Wt 74.4 kg (164 lb)   LMP 11/04/2022 (Exact Date)   BMI 25.69 kg/m    Body mass index is 25.69 kg/m .    In-Clinic Test Results:  Results for orders placed or performed in visit on 12/22/22 (from the past 24 hour(s))   HCG quantitative pregnancy   Result Value Ref Range    hCG Quantitative 7 (H) 0 - 5 IU/L   Hemoglobin   Result Value Ref Range    Hemoglobin 10.3 (L) 11.7 - 15.7 g/dL     Pelvic Ultrasound Report:  Gynecological Ultrasound Report  Pelvic U/S - Transvaginal  [facility]  Referring Provider: Miya Toledo CNM  Sonographer:  Brittnee Spurling, RDMS  Indication: Pain- Pelvic pain Right  LMP: Patient's last menstrual period was 11/04/2022 (exact date).  History: Rt ectopic  treated with methotrexate  Gynecological Ultrasonography:   Uterus: anteverted. Contour is smooth/regular.  Size: 8.3 x 4.4 x 3.7 cm  Endometrium: Thickness Total 8.7 mm  Findings: rt adnexal mass adjacent to ovary- 3.7 x 1.9 x 2.4 cm  Right Ovary: 3.4 x 3.0 x 2.2 cm. Wnl  Left Ovary: 5.1 x 4.7 x 4.0 cm. Simple cyst 4.6 x 3.3 x 4.5 cm  Cul de Sac Free Fluid: No free fluid     Impression:      Normal uterus and endometrial lining.  Left ovary with simple cyst measuring 4.6x3.3x4.5cm.    Right ovary  normal with small medial complex adnexal cyst in comparison to prior ED study on 12/3/2022.  Now measuring 3.7x1.9x2.4cm.  No periadnexal free fluid.  No new free pelvic fluid.  May consider repeat imaging in 6-8 weeks in follow up of both left ovarian cyst and resolving right adnexal cyst following methotrexate treatment.     Alicia Bravo MD    ASSESSMENT/PLAN:                                                        ICD-10-CM    1. Ectopic pregnancy without intrauterine pregnancy, unspecified location  O00.90 Hemoglobin     Hemoglobin     CANCELED: US Pelvic Complete with Transvaginal      2. Ectopic pregnancy of right ovary  O00.201 HCG quantitative pregnancy        # Reviewed expected course of ectopic pregnancy  - Discussed that resolution of serum HCG levels can take up to 8 weeks following methotrexate dose  - Discussed ectopic pregnancy warning signs and when to seek care.    # Ongoing RLQ Abdominal Pain  - TVUS today to evaluate for free fluid in peritoneum   - US negative, called pt to review findings  - Hgb today to evaluate for ongoing blood loss, stable  - Vitally stable at this visit, no evidence of hemodynamic compromise  - Offered rx for management of pain and difficulty sleeping, pt declines.  - Pt to follow-up with psychologist if more support is needed for mood.    20 minutes spent on the date of the encounter doing chart review, history and exam, documentation and further activities per the note    DENNY Gloria CNM

## 2022-12-22 ENCOUNTER — OFFICE VISIT (OUTPATIENT)
Dept: MIDWIFE SERVICES | Facility: CLINIC | Age: 27
End: 2022-12-22
Payer: COMMERCIAL

## 2022-12-22 ENCOUNTER — ANCILLARY PROCEDURE (OUTPATIENT)
Dept: ULTRASOUND IMAGING | Facility: CLINIC | Age: 27
End: 2022-12-22
Payer: COMMERCIAL

## 2022-12-22 VITALS — WEIGHT: 164 LBS | DIASTOLIC BLOOD PRESSURE: 64 MMHG | SYSTOLIC BLOOD PRESSURE: 110 MMHG | BODY MASS INDEX: 25.69 KG/M2

## 2022-12-22 DIAGNOSIS — O00.201 ECTOPIC PREGNANCY OF RIGHT OVARY: ICD-10-CM

## 2022-12-22 DIAGNOSIS — O00.90 ECTOPIC PREGNANCY WITHOUT INTRAUTERINE PREGNANCY, UNSPECIFIED LOCATION: Primary | ICD-10-CM

## 2022-12-22 DIAGNOSIS — O00.90 ECTOPIC PREGNANCY WITHOUT INTRAUTERINE PREGNANCY, UNSPECIFIED LOCATION: ICD-10-CM

## 2022-12-22 DIAGNOSIS — R10.31 ABDOMINAL PAIN, ACUTE, RIGHT LOWER QUADRANT: ICD-10-CM

## 2022-12-22 LAB
B-HCG SERPL-ACNC: 7 IU/L (ref 0–5)
HGB BLD-MCNC: 10.3 G/DL (ref 11.7–15.7)

## 2022-12-22 PROCEDURE — 85018 HEMOGLOBIN: CPT

## 2022-12-22 PROCEDURE — 99213 OFFICE O/P EST LOW 20 MIN: CPT

## 2022-12-22 PROCEDURE — 76830 TRANSVAGINAL US NON-OB: CPT | Performed by: OBSTETRICS & GYNECOLOGY

## 2022-12-22 PROCEDURE — 36415 COLL VENOUS BLD VENIPUNCTURE: CPT

## 2022-12-22 PROCEDURE — 84702 CHORIONIC GONADOTROPIN TEST: CPT

## 2022-12-22 RX ORDER — ALPRAZOLAM 0.25 MG
TABLET ORAL
COMMUNITY
Start: 2022-12-20 | End: 2023-06-29

## 2022-12-23 ENCOUNTER — E-VISIT (OUTPATIENT)
Dept: URGENT CARE | Facility: CLINIC | Age: 27
End: 2022-12-23
Payer: COMMERCIAL

## 2022-12-23 DIAGNOSIS — Z20.822 SUSPECTED COVID-19 VIRUS INFECTION: ICD-10-CM

## 2022-12-23 DIAGNOSIS — R05.9 COUGH, UNSPECIFIED TYPE: Primary | ICD-10-CM

## 2022-12-23 PROCEDURE — 99421 OL DIG E/M SVC 5-10 MIN: CPT | Mod: CS | Performed by: PHYSICIAN ASSISTANT

## 2022-12-23 NOTE — RESULT ENCOUNTER NOTE
Kathi,    Your HCG is dropping really well. It's almost to zero at this point with a 7 likely not even something that would be picked up on a home pregnancy test. However, given the ectopic, we still recommend that you do this weekly until we get to zero and twice in a row, but then I think we should finally be good.    I think we're meeting at some point soon, in person or by phone, so we can discuss what to do long term and what testing if any you would need going forward.    For now though let's plan another HCG next week again.    Carmen Watkins MD

## 2022-12-24 NOTE — PATIENT INSTRUCTIONS
Kathi,    Your symptoms show that you may have coronavirus (COVID-19). This illness can cause fever, cough and trouble breathing. Many people get a mild case and get better on their own. Some people can get very sick.    Because you reported additional symptoms, I would like to also test you for Influenza.    What should I do?  I have placed orders for these tests.   To schedule: go to your [facility] home page and scroll down to the section that says  You have an appointment that needs to be scheduled  and click the large green button that says  Schedule Now  and follow the steps to find the next available openings.     If you are unable to complete these [facility] scheduling steps, please call [phone] to schedule your testing.     These guidelines are for isolating before returning to work, school or .   For employers, schools and day cares: This is an official notice for this person s medical guidelines for returning in-person.   For health care sites: The CDC gives different isolation and quarantine guidelines for healthcare sites, please check with these sites before arriving.     How do I self-isolate?  You isolate when you have symptoms of COVID or a test shows you have COVID, even if you don t have symptoms.   If you DO have symptoms:  Stay home and away from others  For at least 5 days after your symptoms started, AND   You are fever free for 24 hours (with no medicine that reduces fever), AND  Your other symptoms are better.  Wear a mask for 10 full days any time you are around others.  If you DON T have symptoms:  Stay at home and away from others for at least 5 days after your positive test.  Wear a mask for 10 full days any time you are around others.    How can I take care of myself?  Over the counter medications may help with your symptoms such as runny or stuffy nose, cough, chills, or fever. Talk to your care team about your options.     Some people are at high risk of severe illness  (for example, you have a weak immune system, you re 65 years or older, or you have certain medical problems). If your risk is high and your symptoms started in the last 5 to 7 days, we strongly recommend for you to get COVID treatment as soon as possible. Paxlovid, Molnupiravir and the monoclonal antibody treatments are proven safe and effective, make you feel better faster, and prevent hospitalization and death.       To schedule an appointment to discuss COVID treatment, request an appointment on [facility] (select  COVID-19 Treatment ) or call [facility] ([phone]), press 7.    Get lots of rest. Drink extra fluids (unless a doctor has told you not to)  Take Tylenol (acetaminophen) or ibuprofen for fever or pain. If you have liver or kidney problems, ask your family doctor if it's okay to take Tylenol or ibuprofen  Take over the counter medications for your symptoms, as directed by your doctor. You may also talk to your pharmacist.    If you have other health problems (like cancer, heart failure, an organ transplant or severe kidney disease): Call your specialty clinic if you don't feel better in the next 2 days.  Know when to call 911. Emergency warning signs include:  Trouble breathing or shortness of breath  Pain or pressure in the chest that doesn't go away  Feeling confused like you haven't felt before, or not being able to wake up  Bluish-colored lips or face    Where can I get more information?  [facility] - About COVID-19: [email or web address]   CDC - What to Do If You're Sick: https://www.cdc.gov/coronavirus/2019-ncov/if-you-are-sick/index.html   CDC - Quarantine & Isolation: https://www.cdc.gov/coronavirus/2019-ncov/your-health/quarantine-isolation.html   [facility] clinical trials (COVID-19 research studies): [email or web address].[email or web address]/umn-clinical-trials  Below are the COVID-19 hotlines at the [place] Department [facility] ([facility]). Interpreters are available.  For  health questions: Call 473-198-9793 or 1-384.311.1034 (7 a.m. to 7 p.m.)  For questions about schools and childcare: Call 321-283-0271 or 1-840.541.2106 (7 a.m. to 7 p.m.)

## 2022-12-26 ENCOUNTER — HEALTH MAINTENANCE LETTER (OUTPATIENT)
Age: 27
End: 2022-12-26

## 2022-12-30 ENCOUNTER — VIRTUAL VISIT (OUTPATIENT)
Dept: OBGYN | Facility: CLINIC | Age: 27
End: 2022-12-30
Payer: COMMERCIAL

## 2022-12-30 DIAGNOSIS — Z30.49 ENCOUNTER FOR INITIAL MANAGEMENT OF NUVARING: ICD-10-CM

## 2022-12-30 DIAGNOSIS — N83.202 LEFT OVARIAN CYST: ICD-10-CM

## 2022-12-30 DIAGNOSIS — O00.101 RIGHT TUBAL PREGNANCY WITHOUT INTRAUTERINE PREGNANCY: Primary | ICD-10-CM

## 2022-12-30 PROCEDURE — 99214 OFFICE O/P EST MOD 30 MIN: CPT | Mod: 93 | Performed by: OBSTETRICS & GYNECOLOGY

## 2022-12-30 RX ORDER — DEXTROAMPHETAMINE/AMPHETAMINE 15 MG
CAPSULE, EXT RELEASE 24 HR ORAL
COMMUNITY
Start: 2022-12-27 | End: 2023-06-29

## 2022-12-30 RX ORDER — ETONOGESTREL AND ETHINYL ESTRADIOL VAGINAL RING .015; .12 MG/D; MG/D
1 RING VAGINAL
Qty: 4 EACH | Refills: 1 | Status: SHIPPED | OUTPATIENT
Start: 2022-12-30 | End: 2023-02-27

## 2022-12-30 RX ORDER — PENICILLIN V POTASSIUM 500 MG/1
TABLET, FILM COATED ORAL 2 TIMES DAILY
COMMUNITY
End: 2023-02-27

## 2022-12-30 NOTE — PROGRESS NOTES
Kathi Enriquez is a 27 year old female who is being evaluated via a billable telephone visit.      What phone number would you like to be contacted at? [phone]  How would you like to obtain your AVS? [email or web address]    Originating Location (pt. Location): home      Distant Location (provider location):  On-site    SUBJECTIVE:                                                   Kathi Enriquez is a 27 year old female who presents for virtual visit today for the following health issue(s):  Patient presents with:  Follow Up: Discuss recent ectopic pregnancy and plan going forward.    HPI:    Patient is having a virtual visit via phone to discuss her recent ectopic pregnancy and management going forward as well as family planning.     Patient had not intended pregnancy when conceived this last time. Saw the midwives in early December with preg of unknown location on 12/1. Her HCG was 788, 2 days later was 810 and then later that same night was 702.  Spoke to the midwives about the inappropriate rising HCG and they sent her to the  ER. Had an U/S on 12/3 that showed no IUP and thin EMS of 2mm and a right paraovarian mass that was 4.1 x 3.6 x 2cm. Patient was having cramping and mild pain but centrally and not localized. She was hemodynamically stable with normal vitals and hgb.  I counseled the patient that night from the ER regarding the size of a right ectopic though there was no GS, fetal pole and her HCG was dropping, not rising.  Discussed surgery or methotrexate and patient opted for methotrexate that was given that night.  Her day 3 quant was 1033 and by day 7 had dropped to 746, one week later was 278 and 9 days later was 7.  She has had some fullness and pelvic discomfort ever since original ER visit but it was progressively improving and able to work and do normal things.    Had the heaviest period she has ever had on 12/25. Had big clots and significant pain.  Bled enough where she was starting to be a bit  nervous. No tissue passed. The heavy bleeding last a couple of days and now is just barely spotting and the cramping has resolved and feeling better.    Was on birth control in the past and she either had s.e or had a lot of DUB. Had a nexplanon and an IUD?? Both at some point. Also on ocps but years ago. Open to doing something b/c doesn't want to get pregnant unplanned again but hesitant to try things given her experience. Has not been s.a since the ectopic    Patient's last menstrual period was 2022 (exact date).    Patient is not sexually active, .  Using not sexually active for contraception.    reports that she has been smoking cigarettes. She has been smoking an average of .25 packs per day. She has never used smokeless tobacco.    Health maintenance updated:  yes    Today's PHQ-2 Score:   PHQ-2 (  [employer]) 2019   Q1: Little interest or pleasure in doing things 3   Q2: Feeling down, depressed or hopeless 3   PHQ-2 Score 6   PHQ-2 Total Score (12-17 Years)- Positive if 3 or more points; Administer PHQ-A if positive 6   Q1: Little interest or pleasure in doing things -   Q2: Feeling down, depressed or hopeless -   PHQ-2 Score -     Today's PHQ-9 Score:   PHQ-9 SCORE 2022   PHQ-9 Total Score -   PHQ-9 Total Score MyChart -   PHQ-9 Total Score 17     Today's ASHLEY-7 Score:   ASHLEY-7 SCORE 2022   Total Score -   Total Score 15       Problem list and histories reviewed & adjusted, as indicated.  Additional history: as documented.    Patient Active Problem List   Diagnosis     Anxiety     Major depressive disorder, recurrent episode, moderate (H)     Ectopic pregnancy without intrauterine pregnancy, unspecified location     Abdominal pain, acute, right lower quadrant     No past surgical history on file.   Social History     Tobacco Use     Smoking status: Every Day     Packs/day: 0.25     Types: Cigarettes     Smokeless tobacco: Never     Tobacco comments:     smokes 2 or 3 a day     Substance Use Topics     Alcohol use: Yes      Problem (# of Occurrences) Relation (Name,Age of Onset)    Asthma (1) Sister (Nohemy)    Blood Disease (1) Sister (Nohemy): Blood Clots in the lung    Cancer (4) Paternal Grandmother: Lymphoma, Paternal Grandfather: Pancreatic, Father: Melanoma, Maternal Grandfather: Brain Tumor, Bladder Cancer    Diabetes (1) Father: Type II Diabetes    Cancer - colorectal (1) Maternal Grandfather            Current Outpatient Medications   Medication Sig     ADDERALL XR 15 MG 24 hr capsule      ALPRAZolam (XANAX) 0.25 MG tablet      buPROPion (WELLBUTRIN SR) 100 MG 12 hr tablet Take 100 mg by mouth daily     etonogestrel-ethinyl estradiol (NUVARING) 0.12-0.015 MG/24HR vaginal ring Place 1 each vaginally every 28 days     FLUoxetine (PROZAC) 40 MG capsule Take 20 mg by mouth daily     penicillin V (VEETID) 500 MG tablet Take by mouth 2 times daily     No current facility-administered medications for this visit.     Allergies   Allergen Reactions     Nickel Itching, Rash and Swelling         OBJECTIVE:     No vitals were obtained today due to virtual visit.    Physical Exam  GENERAL: Healthy, alert and no distress    PSYCH: Mentation appears normal, affect normal/bright, judgement and insight intact, normal speech     ASSESSMENT/PLAN:                                                      Phone call duration: 20 minutes with an additional 15 minutes of chart review and chart completion, for a total of 35 minutes on the same DOS      ICD-10-CM    1. Right tubal pregnancy without intrauterine pregnancy  O00.101 US Pelvic Complete with Transvaginal      2. Encounter for initial management of nuvaring  Z30.49 etonogestrel-ethinyl estradiol (NUVARING) 0.12-0.015 MG/24HR vaginal ring        Patient has had appropriate response to methotrexate for right ectopic pregnancy  Has not yet repeated her HCG since 8 days ago.   Encouraged her to get that done asap and recommend getting to 0, on 2  occasions before we stop following it.    Discussed that first period after an ectopic is almost like an SAB as the endometrium prepared for pregnancy and then sloughs and that this is typical and shouldn't continue this way. Already almost done and pain is resolved.    Placed orders for an U/s to be done in  8 weeks to follow up on the appearance of a smaller but persistent mass on U/S 8 days ago. The mass was 3.7 x 1.9 x 2.4cm at that point.   The EMS was 8.7mm an dthere was a simple cyst in the right ovary of 4.6 x 3.3 x 4.5cm.    Discussed that the simple cyst on the left ovary is likely physiologic and functional attempt to ovulate . Discussed that her next period may not come exactly in one month but discussed different options for contraception and since her cycle just ended will plan to start nuvaring.    Discussed doing an HSG in the future to check tubal patency for future pregnancy planning but will delay until normal U/s and regular cycles on N.R      Sending 3 month prescription for nuva ring and then if working well will determine refill need at her U/S in 2 months    Carmen Watkins MD  [facility] [place]

## 2023-01-07 ENCOUNTER — LAB (OUTPATIENT)
Dept: LAB | Facility: CLINIC | Age: 28
End: 2023-01-07
Payer: COMMERCIAL

## 2023-01-07 DIAGNOSIS — O00.201 ECTOPIC PREGNANCY OF RIGHT OVARY: ICD-10-CM

## 2023-01-07 LAB — B-HCG SERPL-ACNC: <1 IU/L (ref 0–5)

## 2023-01-07 PROCEDURE — 36415 COLL VENOUS BLD VENIPUNCTURE: CPT

## 2023-01-07 PROCEDURE — 84702 CHORIONIC GONADOTROPIN TEST: CPT

## 2023-01-08 NOTE — RESULT ENCOUNTER NOTE
Kathi,    Your HCG level is now down to zero which is great. We do like to repeat one more, in 1-2 weeks to ensure it stays at zero, but then after that one you are done having to get lab draws.    Carmen Watkins MD

## 2023-01-16 ENCOUNTER — TELEPHONE (OUTPATIENT)
Dept: OBGYN | Facility: CLINIC | Age: 28
End: 2023-01-16

## 2023-01-16 NOTE — TELEPHONE ENCOUNTER
Hx of recent ectopic pregnancy  lmp 12/25/22    Patient placed Nuvaring on 1/7/22  This is first time using after ectopic pregnancy  Has had pretty constant cramping in abdomen on both left and right side of abdomen at level of umbilicus. Also has been experiencing constant low back pain as well.  Denies vaginal bleeding or discharge. No vaginal or urinary symptoms. No increased pain with intercourse.  Discussed does take time for an adjustment period and some women can experience cramping.     Will send message to  to review and advise due to patient history.    Fanny Thao RN

## 2023-01-17 NOTE — TELEPHONE ENCOUNTER
Up by umbilicus sounds much more GI related than gyn related so not sure if fluke timing coincidence or what.    Make sure that if constipation/diarrhea that that is being managed appropriately  If truly gyn then 600mg advil q6hrs prn  But yes, I think this is more than just a new N.R start, it's post all the things that happened with the ectopic and after. So if possible, try to wait it out and do the above things, also heating pad/tylenol/etc, and see if can get through 2 cycles if not 3 and see how things progress

## 2023-01-17 NOTE — TELEPHONE ENCOUNTER
Returned call to pt  Discussed GI vs GYN  Rev iewed necessary f/u if constipation, diarrhea, N/V, fevers with pain.  Otherwise if gyn in cause can manage with 600mg ibu q6hr prn, tylenol, heating pad, etc.  Pt to monitor over next 2-3 cycles and notify clinic with any worsening or continuing s/s  Pt verbalized understanding, in agreement with plan, and voiced no further questions.  Bhupendra Ng RN on 1/17/2023 at 8:59 AM

## 2023-01-19 ENCOUNTER — LAB (OUTPATIENT)
Dept: LAB | Facility: CLINIC | Age: 28
End: 2023-01-19
Payer: COMMERCIAL

## 2023-01-19 DIAGNOSIS — O00.201 ECTOPIC PREGNANCY OF RIGHT OVARY: ICD-10-CM

## 2023-01-19 LAB — HCG INTACT+B SERPL-ACNC: <1 MIU/ML

## 2023-01-19 PROCEDURE — 36415 COLL VENOUS BLD VENIPUNCTURE: CPT

## 2023-01-19 PROCEDURE — 84702 CHORIONIC GONADOTROPIN TEST: CPT

## 2023-01-19 NOTE — RESULT ENCOUNTER NOTE
Kathi,    Your HCG level is again zero.  So now that that's the second time at zero we are finally done having to keep tracking and checking.  Hopefully the nuvaring is going to work well and give you good birth control and just period/cycle control going forward.    Carmen Watkins MD

## 2023-02-17 NOTE — PROGRESS NOTES
"  SUBJECTIVE:                                                   Kathi Enriquez is a 27 year old female who presents to clinic today for the following health issue(s):  Patient presents with:  Follow Up: US      Additional information: ectopic follow up     HPI:    Patient presents for a follow up on her ultrasound after having an ectopic pregnancy on the right, around thxgiving, that was treated successfully with methotrexate.    At the time of that conception was not consistently contracepting. Would use condoms \"sometimes\" and sometimes do withdrawal. Would very generally track when ovulation would be as periods were fairly regular and monthly, and the month they did conceive she had thought she wouldn't be ovulating but clearly was.    Has tried ocps in the past and had a lot of worsening of her mental health on them which is why she hadn't been contracepting at that point. Has at some point tried other methods?? Depo or nexplanon?? And had similar issues.    After following her quants down to zero x2 s/p methotrexate we started her on N.R     Inserted her first nuva ring on New Years. Removed that ring after 4 weeks b/c got off on her count, and then inserted a new ring right immediately after that.   Bleeding that was sometimes heavier and red and other times just brown and spotting started shortly after that second ring was placed and continued for 2+ weeks. After 3 weeks on that ring, she removed it and did a withdrawal on 2/18 and was off the ring for 5 days or so. Her bleeding lingered until 2+ days after ring came out and then finally stopped last week and reinserted a new ring this last Sunday and no bleeding since.     Patient does not have any concrete desire to try for pregnancy in the near future and here with her BF and they just had this conversation yesterday for the first time. Knows she wants kids, scares her that could have difficulty as she gets older and now with a h.o tubal pregnancy, but " in their perfect world they aren't  yet and need to improve finances and wouldn't get pregnant in the immediate future. However if did find out she may have issues then they would both be open to moving up that timeline. Really just nervous b/c sees people struggle and knows risks and chance of pregnancy drops after age 30, etc    Since starting on her ring she notices weight gain and more emotional and can cry at anything really easily and all the time. Patient has had anxiety and depression and ADD for years and on many different meds over the years so feels really in tune to when it's a med adjustment that's needed or something else and does feel like the N.R is the difference.    Currently  On 20mg fluoxetine and wellbutrin, adderall and xanax. Did try with her psychiatrist to bump up to 40mg and had bad HAs so went back down to 20mg and instead added wellbutrin IR.    U/S done today in f/up of ectopic treated with methotrexate for general anatomy eval and future procreative management, etc.:    Uterus: anteverted. Contour is smooth/regular.  Size: 6.3 x 3.4 x 3.2 cm  Endometrium: Thickness Total 5.8 mm  Findings:   Right Ovary: 2.9 x 2.0 x 2.1 cm. Complex cyst 1.4 x 1.0 x 1.0 cm  Left Ovary: 2.8 x 1.9 x 2.3 cm. Wnl  Cul de Sac Free Fluid: No free fluid    The uterus and ovaries were visualized and no abnormalities were seen.  The EMS is thin and normal at 5.8mm  The right ovary has a small complex cyst most consistent with hemorrhagic cyst or endometrioma and 1.4 x 1 x 1 cm  The left ovary is normal  There is a tubular structure adjacent to the right ovary, though no doppler flow placed on it. This could be hydrosalpinx from prior ectopic or could just be a blood vessel running peripherally to the ovary on the right.    After discussion with sonographer this was actually a loop of bowel that was caught at the end of the cine clip and not a vessel nor a hydrosalpinx        Patient's last menstrual period  was 2022 (exact date). bleeding started 1st week in Feb and lasted until  23 replaced nuva ring on the      Patient is sexually active, .  Using Nuvaring  for contraception.    reports that she has been smoking cigarettes. She has been smoking an average of .25 packs per day. She has never used smokeless tobacco.  Tobacco Cessation Action Plan: Information offered: Patient not interested at this time  STD testing offered?  Declined    Health maintenance updated:  yes    Today's PHQ-2 Score:   PHQ-2 (  [employer]) 2019   Q1: Little interest or pleasure in doing things 3   Q2: Feeling down, depressed or hopeless 3   PHQ-2 Score 6   PHQ-2 Total Score (12-17 Years)- Positive if 3 or more points; Administer PHQ-A if positive 6   Q1: Little interest or pleasure in doing things -   Q2: Feeling down, depressed or hopeless -   PHQ-2 Score -     Today's PHQ-9 Score:   PHQ-9 SCORE 2022   PHQ-9 Total Score -   PHQ-9 Total Score MyChart -   PHQ-9 Total Score 17     Today's ASHLEY-7 Score:   ASHLEY-7 SCORE 2022   Total Score -   Total Score 15       Problem list and histories reviewed & adjusted, as indicated.  Additional history: as documented.    Patient Active Problem List   Diagnosis     Anxiety     Major depressive disorder, recurrent episode, moderate (H)     Ectopic pregnancy without intrauterine pregnancy, unspecified location     Abdominal pain, acute, right lower quadrant     Past Surgical History:   Procedure Laterality Date     NO HISTORY OF SURGERY        Social History     Tobacco Use     Smoking status: Every Day     Packs/day: 0.25     Types: Cigarettes     Smokeless tobacco: Never     Tobacco comments:     smokes 2 or 3 a day    Substance Use Topics     Alcohol use: Yes      Problem (# of Occurrences) Relation (Name,Age of Onset)    Asthma (1) Sister (Nohemy)    Blood Disease (1) Sister (Nohemy): Blood Clots in the lung    Cancer (4) Paternal Grandmother: Lymphoma, Paternal  "Grandfather: Pancreatic, Father: Melanoma, Maternal Grandfather: Brain Tumor, Bladder Cancer    Diabetes (1) Father: Type II Diabetes    Cancer - colorectal (1) Maternal Grandfather            Current Outpatient Medications   Medication Sig     ADDERALL XR 15 MG 24 hr capsule      ADDERALL XR 5 MG 24 hr capsule TAKE 1 CAPSULE BY MOUTH ONCE DAILY AT NOON     ALPRAZolam (XANAX) 0.25 MG tablet      buPROPion (WELLBUTRIN SR) 100 MG 12 hr tablet Take 100 mg by mouth daily     etonogestrel-ethinyl estradiol (NUVARING) 0.12-0.015 MG/24HR vaginal ring Place 1 each vaginally every 21 days Place one ring vaginally for 21 days, remove for 7 days and replace with a new one for the next 21 days     FLUoxetine (PROZAC) 20 MG capsule Take 20 mg by mouth daily     No current facility-administered medications for this visit.     Allergies   Allergen Reactions     Nickel Itching, Rash and Swelling       ROS:  12 point review of systems negative other than symptoms noted below or in the HPI.  No urinary frequency or dysuria, bladder or kidney problems      OBJECTIVE:     Ht 1.702 m (5' 7\")   LMP 12/25/2022 (Exact Date)   Breastfeeding No   BMI 25.69 kg/m    Body mass index is 25.69 kg/m .    Exam:  Constitutional:  Appearance: Well nourished, well developed alert, in no acute distress  Skin: General Inspection:  No rashes present, no lesions present, no areas of discoloration.  Neurologic:  Mental Status:  Oriented X3.  Normal strength and tone, sensory exam grossly normal, mentation intact and speech normal.    Psychiatric:  Mentation appears normal and affect normal/bright.     In-Clinic Test Results:  Results for orders placed or performed in visit on 02/27/23 (from the past 24 hour(s))   US Transvaginal Pelvic Non-OB    Narrative    Table formatting from the original result was not included.  US Transvaginal Pelvic Non-OB  Order #: [id number] Accession #: [id number]  Study Notes       Spurling, Brittnee on 2/27/2023  2:06 PM "      Gynecological Ultrasound Report  Pelvic U/S - Transvaginal  [facility]  Referring Provider: Carmen Watkins MD   Sonographer:  Brittnee Spurling, RDMS  Indication: Pain- Pelvic pain Right  LMP: Unknown  History: Rt ectopic treated with methotrexate  Gynecological Ultrasonography:   Uterus: anteverted. Contour is smooth/regular.  Size: 6.3 x 3.4 x 3.2 cm  Endometrium: Thickness Total 5.8 mm  Findings:   Right Ovary: 2.9 x 2.0 x 2.1 cm. Complex cyst 1.4 x 1.0 x 1.0 cm  Left Ovary: 2.8 x 1.9 x 2.3 cm. Wnl  Cul de Sac Free Fluid: No free fluid     Impression:               The uterus and ovaries were visualized and no abnormalities were seen.  The EMS is thin and normal at 5.8mm  The right ovary has a small complex cyst most consistent with hemorrhagic   cyst or endometrioma and 1.4 x 1 x 1 cm  The left ovary is normal  There is a tubular structure adjacent to the right ovary, though no   doppler flow placed on it. This could be hydrosalpinx from prior ectopic   or could just be a blood vessel running peripherally to the ovary on the   right    Carmen Watkins MD         ASSESSMENT/PLAN:                                                        ICD-10-CM    1. History of ectopic pregnancy  Z87.59 XR Hysterosalpingogram      2. Encounter for initial management of nuvaring  Z30.49 etonogestrel-ethinyl estradiol (NUVARING) 0.12-0.015 MG/24HR vaginal ring      3. Breakthrough bleeding with NuvaRing  N92.1     Z97.5       4. Procreative management  Z31.9 XR Hysterosalpingogram            Reviewed her prior history with the ectopic and the reasons ectopics can happen in the first place.  Reviewed successful treatment with methotrexate but need for contraception until ready for a pregnancy but until can evaluate tubal patency going forward so as not to risk another ectopic in the future now that there was potential of 2 damaging factors to her tubal patency at least on the right.    The U/S today looks  normal and the tiny cyst in one ovary is likely of no significance and not endometrioma as doesn't have sx of endo per se.  Her irregular bleeding was due in part to methotrexate therapy from ectopic and HCG levels present but waning, 4 weeks on one ring and then going w/o a break into the next ring, etc.  The overall uterine anatomy is normal so likely just needed this withdrawal week she recently gave herself to reset and now will do q3 week change on ring and will hopefully have regular and over time lighter periods.    Discussed back to back ring in the future and the reasons she could consider that. hwoever with the generic vs brand N.R, typically only recommend 3 weeks with one ring before switching, vs 4 weeks as more BTB occurs that way.    Her weight for some reason was not obtained today but the majority of her gain was by December when the ectopic happened and not since, so very well may  Not be due to the ring but rather all the other hormonal changes that occurred.  Strongly encouraged her to give the ring, in a cyclic fashion, 3 more months at least to try to stabilize her hormones from preg and her bleeding patterns in general and then see how she is doing.    Since ocps can tend to be worse for emotional lability and mirena IUD can increase PMS perception would not be more favorable choice then consistent hormones of the N.R if can get mood/weight acclimated with it and patient is agreeable to this.    Discussed general fecundity and fertility issues and reassured that age of 30 and even 35 is not a set in stone line where fertility dramatically drops off or is only high risk.   Discussed AMH testing and the limitations but also some potential reassurance for her if she'd like to do it in the future.  However will be suppressed on N.R so in the future would stop the ring for 4-6 weeks and then do an AMH if she wishes.    Discuss doing an HSG in the future to test tubal patency on the right where  ectopic was but also the other side.  Would recommend waiting 6 months or so and doing it no sooner than this summer to allow the ectopic tissue to more fully resorb and not end up with a false positive occlusion from some scarring that would have improved with time.  If there is occlusion or hydro, then would discuss either L/S for removal of that tube and then trying for preg w/o worry of another ectopic, or would do f.s to see what side ovulation is on and then only try on left sided months, once ready to try.    Will place order for HSG and when patient is ready to schedule will contact us likely some time this summer.    On the same DOS, 42 minutes, were spent on direct management of the patient's medical issues as above as well as chart review including: imaging, lab work, previous visit notes by this provider,  and other provider notes, and chart completion       Carmen Watkins MD  [facility] [place]

## 2023-02-27 ENCOUNTER — OFFICE VISIT (OUTPATIENT)
Dept: OBGYN | Facility: CLINIC | Age: 28
End: 2023-02-27
Payer: COMMERCIAL

## 2023-02-27 ENCOUNTER — ANCILLARY PROCEDURE (OUTPATIENT)
Dept: ULTRASOUND IMAGING | Facility: CLINIC | Age: 28
End: 2023-02-27
Attending: OBSTETRICS & GYNECOLOGY
Payer: COMMERCIAL

## 2023-02-27 ENCOUNTER — ANCILLARY ORDERS (OUTPATIENT)
Dept: OBGYN | Facility: CLINIC | Age: 28
End: 2023-02-27

## 2023-02-27 VITALS — BODY MASS INDEX: 25.69 KG/M2 | HEIGHT: 67 IN

## 2023-02-27 DIAGNOSIS — Z97.5 BREAKTHROUGH BLEEDING WITH NUVARING: ICD-10-CM

## 2023-02-27 DIAGNOSIS — O00.101 RIGHT TUBAL PREGNANCY WITHOUT INTRAUTERINE PREGNANCY: ICD-10-CM

## 2023-02-27 DIAGNOSIS — Z87.59 HISTORY OF ECTOPIC PREGNANCY: Primary | ICD-10-CM

## 2023-02-27 DIAGNOSIS — Z31.9 PROCREATIVE MANAGEMENT: ICD-10-CM

## 2023-02-27 DIAGNOSIS — N92.1 BREAKTHROUGH BLEEDING WITH NUVARING: ICD-10-CM

## 2023-02-27 DIAGNOSIS — Z30.49 ENCOUNTER FOR INITIAL MANAGEMENT OF NUVARING: ICD-10-CM

## 2023-02-27 PROCEDURE — 76830 TRANSVAGINAL US NON-OB: CPT | Performed by: OBSTETRICS & GYNECOLOGY

## 2023-02-27 PROCEDURE — 99215 OFFICE O/P EST HI 40 MIN: CPT | Performed by: OBSTETRICS & GYNECOLOGY

## 2023-02-27 RX ORDER — DEXTROAMPHETAMINE SULFATE, DEXTROAMPHETAMINE SACCHARATE, AMPHETAMINE SULFATE AND AMPHETAMINE ASPARTATE 1.25; 1.25; 1.25; 1.25 MG/1; MG/1; MG/1; MG/1
CAPSULE, EXTENDED RELEASE ORAL
COMMUNITY
Start: 2023-02-16 | End: 2023-06-29

## 2023-02-27 RX ORDER — ETONOGESTREL AND ETHINYL ESTRADIOL VAGINAL RING .015; .12 MG/D; MG/D
1 RING VAGINAL
Qty: 4 EACH | Refills: 3 | Status: SHIPPED | OUTPATIENT
Start: 2023-02-27 | End: 2023-06-29

## 2023-03-01 ENCOUNTER — MYC MEDICAL ADVICE (OUTPATIENT)
Dept: OBGYN | Facility: CLINIC | Age: 28
End: 2023-03-01
Payer: COMMERCIAL

## 2023-03-01 NOTE — TELEPHONE ENCOUNTER
2/27/23 GAUDENCIO Watkins    Routing pt mychart message to provider to advise.  Normal? Will this improve? OTC monistat ok?    Naidne Cool RN on 3/1/2023 at 12:03 PM

## 2023-03-01 NOTE — TELEPHONE ENCOUNTER
No she should be fine to do monistat and see if that works.  I always rec monistat 7 and can do it for 3-4 days and if resolves then can save the other doses for a later time if needed.  Could do monistat 3 of course.  Would not do monistat 1 ovule as different antifungal despite same name and typically causes a lot of burning/irritation

## 2023-05-26 ENCOUNTER — TELEPHONE (OUTPATIENT)
Dept: OBGYN | Facility: CLINIC | Age: 28
End: 2023-05-26
Payer: COMMERCIAL

## 2023-05-26 NOTE — TELEPHONE ENCOUNTER
Reason for call:  Other   Patient called regarding (reason for call): call back  Additional comments: patient called because she had an ectopic pregnancy recently and was told to come in for a dye test to check her fallopian tubes?    Phone number to reach patient:  Cell number on file:    Telephone Information:   Mobile [phone]     Best Time:  any    Can we leave a detailed message on this number?  YES    Travel screening: Not Applicable

## 2023-05-26 NOTE — TELEPHONE ENCOUNTER
Attempted to call and left detailed vm about HSG order in chart and number to call to schedule. ALso sent a [employer] w instructions, number to call and teaching sheet.    Nadine Cool RN on 5/26/2023 at 8:55 AM

## 2023-06-07 ENCOUNTER — LAB (OUTPATIENT)
Dept: LAB | Facility: CLINIC | Age: 28
End: 2023-06-07
Payer: COMMERCIAL

## 2023-06-07 DIAGNOSIS — O36.80X0 PREGNANCY WITH INCONCLUSIVE FETAL VIABILITY: ICD-10-CM

## 2023-06-07 DIAGNOSIS — Z87.59 HISTORY OF ECTOPIC PREGNANCY: ICD-10-CM

## 2023-06-07 LAB — HCG INTACT+B SERPL-ACNC: 22 MIU/ML

## 2023-06-07 PROCEDURE — 84702 CHORIONIC GONADOTROPIN TEST: CPT

## 2023-06-07 PROCEDURE — 36415 COLL VENOUS BLD VENIPUNCTURE: CPT

## 2023-06-08 NOTE — RESULT ENCOUNTER NOTE
Informed via [facility], baseline HCG 22, history of ecotpic. Repeat hcg Friday    DENNY Jimenez, CNM

## 2023-06-09 ENCOUNTER — LAB (OUTPATIENT)
Dept: LAB | Facility: CLINIC | Age: 28
End: 2023-06-09
Payer: COMMERCIAL

## 2023-06-09 DIAGNOSIS — O36.80X0 PREGNANCY WITH INCONCLUSIVE FETAL VIABILITY: ICD-10-CM

## 2023-06-09 DIAGNOSIS — Z87.59 HISTORY OF ECTOPIC PREGNANCY: ICD-10-CM

## 2023-06-09 LAB — HCG SERPL-ACNC: 111 MLU/ML (ref 0–4)

## 2023-06-09 PROCEDURE — 84702 CHORIONIC GONADOTROPIN TEST: CPT

## 2023-06-09 PROCEDURE — 36415 COLL VENOUS BLD VENIPUNCTURE: CPT

## 2023-06-10 ENCOUNTER — MYC MEDICAL ADVICE (OUTPATIENT)
Dept: MIDWIFE SERVICES | Facility: CLINIC | Age: 28
End: 2023-06-10
Payer: COMMERCIAL

## 2023-06-10 DIAGNOSIS — Z87.59 HX OF ECTOPIC PREGNANCY: Primary | ICD-10-CM

## 2023-06-12 NOTE — TELEPHONE ENCOUNTER
Routing pt [employer] message to provider to advise.    Nadine Cool RN on 6/12/2023 at 9:11 AM

## 2023-06-13 ENCOUNTER — LAB (OUTPATIENT)
Dept: LAB | Facility: CLINIC | Age: 28
End: 2023-06-13
Payer: COMMERCIAL

## 2023-06-13 DIAGNOSIS — Z87.59 HX OF ECTOPIC PREGNANCY: ICD-10-CM

## 2023-06-13 PROCEDURE — 36415 COLL VENOUS BLD VENIPUNCTURE: CPT

## 2023-06-13 PROCEDURE — 84702 CHORIONIC GONADOTROPIN TEST: CPT

## 2023-06-14 LAB — HCG INTACT+B SERPL-ACNC: 872 MIU/ML

## 2023-06-15 ENCOUNTER — LAB (OUTPATIENT)
Dept: LAB | Facility: CLINIC | Age: 28
End: 2023-06-15
Payer: COMMERCIAL

## 2023-06-15 DIAGNOSIS — Z87.59 HX OF ECTOPIC PREGNANCY: ICD-10-CM

## 2023-06-15 LAB — HCG INTACT+B SERPL-ACNC: 1637 MIU/ML

## 2023-06-15 PROCEDURE — 36415 COLL VENOUS BLD VENIPUNCTURE: CPT

## 2023-06-15 PROCEDURE — 84702 CHORIONIC GONADOTROPIN TEST: CPT

## 2023-06-29 ENCOUNTER — VIRTUAL VISIT (OUTPATIENT)
Dept: MIDWIFE SERVICES | Facility: CLINIC | Age: 28
End: 2023-06-29
Attending: NURSE PRACTITIONER
Payer: COMMERCIAL

## 2023-06-29 ENCOUNTER — ANCILLARY PROCEDURE (OUTPATIENT)
Dept: ULTRASOUND IMAGING | Facility: CLINIC | Age: 28
End: 2023-06-29
Attending: NURSE PRACTITIONER
Payer: COMMERCIAL

## 2023-06-29 DIAGNOSIS — Z34.01 ENCOUNTER FOR SUPERVISION OF NORMAL FIRST PREGNANCY IN FIRST TRIMESTER: ICD-10-CM

## 2023-06-29 DIAGNOSIS — O36.80X0 PREGNANCY WITH INCONCLUSIVE FETAL VIABILITY: ICD-10-CM

## 2023-06-29 DIAGNOSIS — O21.9 NAUSEA AND VOMITING DURING PREGNANCY: ICD-10-CM

## 2023-06-29 DIAGNOSIS — Z3A.01 6 WEEKS GESTATION OF PREGNANCY: ICD-10-CM

## 2023-06-29 DIAGNOSIS — R21 RASH: Primary | ICD-10-CM

## 2023-06-29 DIAGNOSIS — Z87.59 HISTORY OF ECTOPIC PREGNANCY: ICD-10-CM

## 2023-06-29 PROCEDURE — 76817 TRANSVAGINAL US OBSTETRIC: CPT | Performed by: STUDENT IN AN ORGANIZED HEALTH CARE EDUCATION/TRAINING PROGRAM

## 2023-06-29 PROCEDURE — 99213 OFFICE O/P EST LOW 20 MIN: CPT | Mod: 93 | Performed by: ADVANCED PRACTICE MIDWIFE

## 2023-06-29 RX ORDER — GABAPENTIN 100 MG/1
CAPSULE ORAL
Status: ON HOLD | COMMUNITY
Start: 2023-06-04 | End: 2024-01-14

## 2023-06-29 NOTE — PROGRESS NOTES
Kathi Enriquez is a 27 year old female who is being evaluated via a billable telephone visit.      What phone number would you like to be contacted at? [phone]  How would you like to obtain your AVS? [employer]    Originating Location (pt. Location): home      Distant Location (provider location):  On-site      SUBJECTIVE:                                                  Kathi Enriquez is a 27 year old female who presents for virtual visit today for the following health issue(s):  Patient presents with:  Follow Up: Viability US      Additional information:     HPI:    Had an early US today for fetal viability. Has a hx of an ectopic pregnancy.    Formal US report not available during this phone call. Awaiting MD review  Preliminary results discussed with patient.     Yolk sac and gestational sac visualized. No note of adnexal masses made on report.   Pregnancy is measuring 6w4d  -117      Denies any vaginal bleeding. Has been experiencing mild cramping on her L side, but notices the cramping along with when she has a full bladder.     Experiencing nausea and this week began with vomiting. Is taking Zofran regularly but finds it does not last the full 8 hrs.     Rash still present (started the beginning of the month) and is continuing to spread outward on body. See pictures from previous [employer] message. First used cortisone cream which dried her skin out so she discontinued that. Also has tried Aquaphor, but rash is continuing to spread.     Rash does not itch all the time  No pus, oozing or signs of infection    Has a cat, does not change the litter box  Works as a nanny  Has had the chicken pox vaccine      No LMP recorded. (Menstrual status: Birth Control).    Patient is sexually active, .  Using none for contraception.    reports that she has been smoking cigarettes. She has been smoking an average of .25 packs per day. She has never used smokeless tobacco.      Health maintenance updated:   yes    Today's PHQ-2 Score:       4/11/2019     2:58 PM   PHQ-2 ( 1999 [employer])   Q1: Little interest or pleasure in doing things 3   Q2: Feeling down, depressed or hopeless 3   PHQ-2 Score 6   PHQ-2 Total Score (12-17 Years)- Positive if 3 or more points; Administer PHQ-A if positive 6     Today's PHQ-9 Score:       12/1/2022     2:29 PM   PHQ-9 SCORE   PHQ-9 Total Score 17     Today's ASHLEY-7 Score:       12/1/2022     2:29 PM   ASHLEY-7 SCORE   Total Score 15       Problem list and histories reviewed & adjusted, as indicated.  Additional history: as documented.    Patient Active Problem List   Diagnosis     Anxiety     Major depressive disorder, recurrent episode, moderate (H)     Ectopic pregnancy without intrauterine pregnancy, unspecified location     Abdominal pain, acute, right lower quadrant     Past Surgical History:   Procedure Laterality Date     NO HISTORY OF SURGERY        Social History     Tobacco Use     Smoking status: Every Day     Packs/day: 0.25     Types: Cigarettes     Smokeless tobacco: Never     Tobacco comments:     smokes 2 or 3 a day    Substance Use Topics     Alcohol use: Yes      Problem (# of Occurrences) Relation (Name,Age of Onset)    Asthma (1) Sister (Nohemy)    Blood Disease (1) Sister (Nohemy): Blood Clots in the lung    Cancer (4) Paternal Grandmother: Lymphoma, Paternal Grandfather: Pancreatic, Father: Melanoma, Maternal Grandfather: Brain Tumor, Bladder Cancer    Diabetes (1) Father: Type II Diabetes    Cancer - colorectal (1) Maternal Grandfather            Current Outpatient Medications   Medication Sig     buPROPion (WELLBUTRIN SR) 100 MG 12 hr tablet Take 100 mg by mouth daily     FLUoxetine (PROZAC) 20 MG capsule Take 30 mg by mouth daily     gabapentin (NEURONTIN) 100 MG capsule TAKE ONE 1 CAPSULE BY MOUTH 3 TIMES A DAY, AS NEEDED*     No current facility-administered medications for this visit.     Allergies   Allergen Reactions     Nickel Itching, Rash and Swelling          OBJECTIVE:     No vitals were obtained today due to virtual visit.    Physical Exam  Deferred, telephone visit      ASSESSMENT/PLAN:                                                      Phone call duration: 26 minutes      ICD-10-CM    1. Rash  R21       2. 6 weeks gestation of pregnancy  Z3A.01       3. Encounter for supervision of normal first pregnancy in first trimester  Z34.01       4. Nausea and vomiting during pregnancy  O21.9         Discussed reassuring preliminary results and repeat US again with 1st OB appt.     Counseled on oral supplements and which are okay in pregnancy and which are not    Discussed other options for rash treatment (SARNA lotion, Aveeno oatmeal lotion or baths, [place] soap) Can consider rx steroid if rash worsens    Declines wanting to change nausea medication at this time. Suggested adding Vitamin B6/Unisom combo to Zofran use. If no relief can then consider switching medications. Can consider dermatology consult or TORCH screening at 1st OB.     [facility] [place]    Telephone time: 26 min

## 2023-07-13 ENCOUNTER — LAB REQUISITION (OUTPATIENT)
Dept: LAB | Facility: CLINIC | Age: 28
End: 2023-07-13
Payer: COMMERCIAL

## 2023-07-13 DIAGNOSIS — Z34.81 ENCOUNTER FOR SUPERVISION OF OTHER NORMAL PREGNANCY, FIRST TRIMESTER: ICD-10-CM

## 2023-07-13 LAB
ABO/RH(D): NORMAL
ANTIBODY SCREEN: NEGATIVE
BASOPHILS # BLD AUTO: 0 10E3/UL (ref 0–0.2)
BASOPHILS NFR BLD AUTO: 0 %
EOSINOPHIL # BLD AUTO: 0.1 10E3/UL (ref 0–0.7)
EOSINOPHIL NFR BLD AUTO: 1 %
ERYTHROCYTE [DISTWIDTH] IN BLOOD BY AUTOMATED COUNT: 18.4 % (ref 10–15)
HBV SURFACE AG SERPL QL IA: NONREACTIVE
HCT VFR BLD AUTO: 39.9 % (ref 35–47)
HCV AB SERPL QL IA: NONREACTIVE
HGB BLD-MCNC: 11.9 G/DL (ref 11.7–15.7)
IMM GRANULOCYTES # BLD: 0 10E3/UL
IMM GRANULOCYTES NFR BLD: 0 %
LYMPHOCYTES # BLD AUTO: 1.7 10E3/UL (ref 0.8–5.3)
LYMPHOCYTES NFR BLD AUTO: 17 %
MCH RBC QN AUTO: 24.3 PG (ref 26.5–33)
MCHC RBC AUTO-ENTMCNC: 29.8 G/DL (ref 31.5–36.5)
MCV RBC AUTO: 81 FL (ref 78–100)
MONOCYTES # BLD AUTO: 0.5 10E3/UL (ref 0–1.3)
MONOCYTES NFR BLD AUTO: 5 %
NEUTROPHILS # BLD AUTO: 7.2 10E3/UL (ref 1.6–8.3)
NEUTROPHILS NFR BLD AUTO: 77 %
NRBC # BLD AUTO: 0 10E3/UL
NRBC BLD AUTO-RTO: 0 /100
PLATELET # BLD AUTO: 355 10E3/UL (ref 150–450)
RBC # BLD AUTO: 4.9 10E6/UL (ref 3.8–5.2)
SPECIMEN EXPIRATION DATE: NORMAL
WBC # BLD AUTO: 9.5 10E3/UL (ref 4–11)

## 2023-07-13 PROCEDURE — 87340 HEPATITIS B SURFACE AG IA: CPT | Mod: ORL | Performed by: OBSTETRICS & GYNECOLOGY

## 2023-07-13 PROCEDURE — 86803 HEPATITIS C AB TEST: CPT | Mod: ORL | Performed by: OBSTETRICS & GYNECOLOGY

## 2023-07-13 PROCEDURE — 85025 COMPLETE CBC W/AUTO DIFF WBC: CPT | Mod: ORL | Performed by: OBSTETRICS & GYNECOLOGY

## 2023-07-13 PROCEDURE — 87491 CHLMYD TRACH DNA AMP PROBE: CPT | Mod: ORL | Performed by: OBSTETRICS & GYNECOLOGY

## 2023-07-13 PROCEDURE — 86592 SYPHILIS TEST NON-TREP QUAL: CPT | Mod: ORL | Performed by: OBSTETRICS & GYNECOLOGY

## 2023-07-13 PROCEDURE — 86850 RBC ANTIBODY SCREEN: CPT | Mod: ORL | Performed by: OBSTETRICS & GYNECOLOGY

## 2023-07-13 PROCEDURE — 87086 URINE CULTURE/COLONY COUNT: CPT | Mod: ORL | Performed by: OBSTETRICS & GYNECOLOGY

## 2023-07-13 PROCEDURE — 87591 N.GONORRHOEAE DNA AMP PROB: CPT | Mod: ORL | Performed by: OBSTETRICS & GYNECOLOGY

## 2023-07-13 PROCEDURE — 86762 RUBELLA ANTIBODY: CPT | Mod: ORL | Performed by: OBSTETRICS & GYNECOLOGY

## 2023-07-13 PROCEDURE — 87389 HIV-1 AG W/HIV-1&-2 AB AG IA: CPT | Mod: ORL | Performed by: OBSTETRICS & GYNECOLOGY

## 2023-07-13 PROCEDURE — 86901 BLOOD TYPING SEROLOGIC RH(D): CPT | Mod: ORL | Performed by: OBSTETRICS & GYNECOLOGY

## 2023-07-14 LAB
C TRACH DNA SPEC QL PROBE+SIG AMP: NEGATIVE
HIV 1+2 AB+HIV1 P24 AG SERPL QL IA: NONREACTIVE
N GONORRHOEA DNA SPEC QL NAA+PROBE: NEGATIVE
RPR SER QL: NONREACTIVE

## 2023-07-15 LAB — BACTERIA UR CULT: NORMAL

## 2023-07-17 LAB
RUBV IGG SERPL QL IA: 3.25 INDEX
RUBV IGG SERPL QL IA: POSITIVE

## 2023-11-27 ENCOUNTER — LAB REQUISITION (OUTPATIENT)
Dept: LAB | Facility: CLINIC | Age: 28
End: 2023-11-27
Payer: COMMERCIAL

## 2023-11-27 DIAGNOSIS — Z36.89 ENCOUNTER FOR OTHER SPECIFIED ANTENATAL SCREENING: ICD-10-CM

## 2023-11-27 LAB
ERYTHROCYTE [DISTWIDTH] IN BLOOD BY AUTOMATED COUNT: 13.8 % (ref 10–15)
HCT VFR BLD AUTO: 37.1 % (ref 35–47)
HGB BLD-MCNC: 12.3 G/DL (ref 11.7–15.7)
MCH RBC QN AUTO: 30 PG (ref 26.5–33)
MCHC RBC AUTO-ENTMCNC: 33.2 G/DL (ref 31.5–36.5)
MCV RBC AUTO: 91 FL (ref 78–100)
PLATELET # BLD AUTO: 203 10E3/UL (ref 150–450)
RBC # BLD AUTO: 4.1 10E6/UL (ref 3.8–5.2)
WBC # BLD AUTO: 11.2 10E3/UL (ref 4–11)

## 2023-11-27 PROCEDURE — 85027 COMPLETE CBC AUTOMATED: CPT | Mod: ORL

## 2023-11-27 PROCEDURE — 86592 SYPHILIS TEST NON-TREP QUAL: CPT | Mod: ORL

## 2023-11-28 LAB — RPR SER QL: NONREACTIVE

## 2023-11-29 ENCOUNTER — LAB REQUISITION (OUTPATIENT)
Dept: LAB | Facility: CLINIC | Age: 28
End: 2023-11-29
Payer: COMMERCIAL

## 2023-11-29 DIAGNOSIS — R10.11 RIGHT UPPER QUADRANT PAIN: ICD-10-CM

## 2023-11-29 LAB
ALBUMIN SERPL BCG-MCNC: 3.8 G/DL (ref 3.5–5.2)
ALP SERPL-CCNC: 76 U/L (ref 40–150)
ALT SERPL W P-5'-P-CCNC: 13 U/L (ref 0–50)
AST SERPL W P-5'-P-CCNC: 16 U/L (ref 0–45)
BILIRUB DIRECT SERPL-MCNC: <0.2 MG/DL (ref 0–0.3)
BILIRUB SERPL-MCNC: 0.2 MG/DL
ERYTHROCYTE [DISTWIDTH] IN BLOOD BY AUTOMATED COUNT: 14 % (ref 10–15)
HCT VFR BLD AUTO: 39.9 % (ref 35–47)
HGB BLD-MCNC: 13.3 G/DL (ref 11.7–15.7)
MCH RBC QN AUTO: 29.8 PG (ref 26.5–33)
MCHC RBC AUTO-ENTMCNC: 33.3 G/DL (ref 31.5–36.5)
MCV RBC AUTO: 90 FL (ref 78–100)
PLATELET # BLD AUTO: 228 10E3/UL (ref 150–450)
PROT SERPL-MCNC: 6.9 G/DL (ref 6.4–8.3)
RBC # BLD AUTO: 4.46 10E6/UL (ref 3.8–5.2)
WBC # BLD AUTO: 14.7 10E3/UL (ref 4–11)

## 2023-11-29 PROCEDURE — 85027 COMPLETE CBC AUTOMATED: CPT | Mod: ORL | Performed by: OBSTETRICS & GYNECOLOGY

## 2023-11-29 PROCEDURE — 80076 HEPATIC FUNCTION PANEL: CPT | Mod: ORL | Performed by: OBSTETRICS & GYNECOLOGY

## 2023-12-05 ENCOUNTER — HOSPITAL ENCOUNTER (OUTPATIENT)
Dept: ULTRASOUND IMAGING | Facility: CLINIC | Age: 28
Discharge: HOME | End: 2023-12-05
Attending: OBSTETRICS & GYNECOLOGY | Admitting: OBSTETRICS & GYNECOLOGY
Payer: COMMERCIAL

## 2023-12-05 DIAGNOSIS — R10.11 RUQ PAIN: ICD-10-CM

## 2023-12-05 PROCEDURE — 76705 ECHO EXAM OF ABDOMEN: CPT

## 2024-01-14 ENCOUNTER — HOSPITAL ENCOUNTER (OUTPATIENT)
Facility: CLINIC | Age: 29
Discharge: HOME | End: 2024-01-14
Attending: OBSTETRICS & GYNECOLOGY | Admitting: OBSTETRICS & GYNECOLOGY
Payer: COMMERCIAL

## 2024-01-14 VITALS
SYSTOLIC BLOOD PRESSURE: 120 MMHG | WEIGHT: 187 LBS | HEART RATE: 83 BPM | DIASTOLIC BLOOD PRESSURE: 76 MMHG | BODY MASS INDEX: 28.34 KG/M2 | RESPIRATION RATE: 18 BRPM | TEMPERATURE: 98.7 F | HEIGHT: 68 IN

## 2024-01-14 PROCEDURE — G0463 HOSPITAL OUTPT CLINIC VISIT: HCPCS | Mod: 25

## 2024-01-14 PROCEDURE — 999N000105 HC STATISTIC NO DOCUMENTATION TO SUPPORT CHARGE

## 2024-01-14 PROCEDURE — 59025 FETAL NON-STRESS TEST: CPT

## 2024-01-14 RX ORDER — MULTIVITAMIN WITH IRON
1 TABLET ORAL DAILY
COMMUNITY

## 2024-01-14 RX ORDER — LANOLIN ALCOHOL/MO/W.PET/CERES
1000 CREAM (GRAM) TOPICAL DAILY
COMMUNITY

## 2024-01-14 RX ORDER — PROCHLORPERAZINE MALEATE 5 MG
10 TABLET ORAL EVERY 6 HOURS PRN
Status: DISCONTINUED | OUTPATIENT
Start: 2024-01-14 | End: 2024-01-14 | Stop reason: HOSPADM

## 2024-01-14 RX ORDER — SERTRALINE HYDROCHLORIDE 25 MG/1
25 TABLET, FILM COATED ORAL DAILY
COMMUNITY

## 2024-01-14 RX ORDER — ONDANSETRON 4 MG/1
4 TABLET, ORALLY DISINTEGRATING ORAL EVERY 6 HOURS PRN
Status: DISCONTINUED | OUTPATIENT
Start: 2024-01-14 | End: 2024-01-14 | Stop reason: HOSPADM

## 2024-01-14 RX ORDER — OMEGA-3/DHA/EPA/ALGAL OIL 275-150 MG
1 CAPSULE ORAL DAILY
COMMUNITY

## 2024-01-14 RX ORDER — PROCHLORPERAZINE 25 MG
25 SUPPOSITORY, RECTAL RECTAL EVERY 12 HOURS PRN
Status: DISCONTINUED | OUTPATIENT
Start: 2024-01-14 | End: 2024-01-14 | Stop reason: HOSPADM

## 2024-01-14 RX ORDER — ONDANSETRON 2 MG/ML
4 INJECTION INTRAMUSCULAR; INTRAVENOUS EVERY 6 HOURS PRN
Status: DISCONTINUED | OUTPATIENT
Start: 2024-01-14 | End: 2024-01-14 | Stop reason: HOSPADM

## 2024-01-14 RX ORDER — METOCLOPRAMIDE 10 MG/1
10 TABLET ORAL EVERY 6 HOURS PRN
Status: DISCONTINUED | OUTPATIENT
Start: 2024-01-14 | End: 2024-01-14 | Stop reason: HOSPADM

## 2024-01-14 RX ORDER — METOCLOPRAMIDE HYDROCHLORIDE 5 MG/ML
10 INJECTION INTRAMUSCULAR; INTRAVENOUS EVERY 6 HOURS PRN
Status: DISCONTINUED | OUTPATIENT
Start: 2024-01-14 | End: 2024-01-14 | Stop reason: HOSPADM

## 2024-01-14 ASSESSMENT — ACTIVITIES OF DAILY LIVING (ADL): ADLS_ACUITY_SCORE: 20

## 2024-01-15 NOTE — DISCHARGE INSTRUCTIONS
Discharge Instruction for Undelivered Patients      You were seen for: Fetal Assessment  We Consulted: Dr. Lira  You had (Test or Medicine): Non-Stress Test (NST)     Diet:   Drink 8 to 12 glasses of liquids (milk, juice, water) every day.  You may eat meals and snacks.     Activity:  Count fetal kicks everyday (see handout)  Call your doctor or nurse midwife if your baby is moving less than usual.     Call your provider if you notice:  Swelling in your face or increased swelling in your hands or legs.  Headaches that are not relieved by Tylenol (acetaminophen).  Changes in your vision (blurring: seeing spots or stars.)  Nausea (sick to your stomach) and vomiting (throwing up).   Weight gain of 5 pounds or more per week.  Heartburn that doesn't go away.  Signs of bladder infection: pain when you urinate (use the toilet), need to go more often and more urgently.  The bag of cason (rupture of membranes) breaks, or you notice leaking in your underwear.  Bright red blood in your underwear.  Abdominal (lower belly) or stomach pain.  For first baby: Contractions (tightening) less than 5 minutes apart for one hour or more.  Second (plus) baby: Contractions (tightening) less than 10 minutes apart and getting stronger.  *If less than 34 weeks: Contractions (tightening) more than 6 times in one hour.  Increase or change in vaginal discharge (note the color and amount)    Follow-up:  As scheduled in the clinic        Counting Your Baby's Kicks: Care Instructions  Overview     Counting your baby's kicks is one way your doctor can tell that your baby is healthy. You will probably feel your baby move for the first time between 16 and 22 weeks. The movement may feel like flutters rather than kicks. Your baby may move more at certain times of the day. When you are active, you may notice less kicking than when you are resting. At your prenatal visits, your doctor will ask whether the baby is active.  In your last trimester, your  "doctor may ask you to count the number of times you feel your baby move.  Follow-up care is a key part of your treatment and safety. Be sure to make and go to all appointments, and call your doctor if you are having problems. It's also a good idea to know your test results and keep a list of the medicines you take.  How do you count fetal kicks?  A common method of checking your baby's movement is to note the length of time it takes to count 10 movements (such as kicks, flutters, or rolls).  Pick your baby's most active time of day to count. This may be any time from morning to evening.  If you don't feel 10 movements in an hour, have something to eat or drink and count for another hour. If you don't feel at least 10 movements in the 2-hour period, call your doctor.  Do not use an at-home Doppler heart monitor in place of counting fetal movements.  When should you call for help?   Call your doctor now or seek immediate medical care if:    You feel fewer than 10 movements in a 2-hour period.     You noticed that your baby has stopped moving or is moving less than normal.   Watch closely for changes in your health, and be sure to contact your doctor if you have any problems.  Where can you learn more?  Go to https://www.[employer].net/patiented  Enter U048 in the search box to learn more about \"Counting Your Baby's Kicks: Care Instructions.\"  Current as of: July 11, 2023               Content Version: 13.8    9482-1615 [employer].   Care instructions adapted under license by your healthcare professional. If you have questions about a medical condition or this instruction, always ask your healthcare professional. [employer] disclaims any warranty or liability for your use of this information.      "

## 2024-01-15 NOTE — PLAN OF CARE
1920 - Pt comes to triage with c/o decreased fetal movement. .EUM/US applied. VSS WNL. Admission database obtained and prenatal record reviewed.    1940 - Patient reports feeling 2 kicks since arriving to hospital.     1953 - Update to Dr. Curry and orders received from discharge. .    2015 - Patient wanting to wait until feeling 10 kick counts to help ease anxiety she is feeling. At this time has felt 6 kick counts and plans to call RN when she feels them all.    2030 - Patient called RN into room, felt 10 kick counts at this time.     2046 - Discharge instructions given with verbal understanding, and patient discharged home with significant other..Patient will follow up in clinic as schedule which is next week, or contact on-call MD with any further concerns.

## 2024-01-22 ENCOUNTER — LAB REQUISITION (OUTPATIENT)
Dept: LAB | Facility: CLINIC | Age: 29
End: 2024-01-22
Payer: COMMERCIAL

## 2024-01-22 DIAGNOSIS — Z3A.36 36 WEEKS GESTATION OF PREGNANCY: ICD-10-CM

## 2024-01-22 PROCEDURE — 87653 STREP B DNA AMP PROBE: CPT | Mod: ORL | Performed by: NURSE PRACTITIONER

## 2024-01-23 LAB — GP B STREP DNA SPEC QL NAA+PROBE: NEGATIVE

## 2024-02-01 ENCOUNTER — HOSPITAL ENCOUNTER (OUTPATIENT)
Facility: CLINIC | Age: 29
Discharge: HOME | End: 2024-02-01
Attending: OBSTETRICS & GYNECOLOGY | Admitting: OBSTETRICS & GYNECOLOGY
Payer: COMMERCIAL

## 2024-02-01 ENCOUNTER — APPOINTMENT (OUTPATIENT)
Dept: ULTRASOUND IMAGING | Facility: CLINIC | Age: 29
End: 2024-02-01
Attending: OBSTETRICS & GYNECOLOGY
Payer: COMMERCIAL

## 2024-02-01 VITALS
HEIGHT: 68 IN | BODY MASS INDEX: 30.92 KG/M2 | RESPIRATION RATE: 16 BRPM | TEMPERATURE: 98.1 F | DIASTOLIC BLOOD PRESSURE: 75 MMHG | WEIGHT: 204 LBS | SYSTOLIC BLOOD PRESSURE: 111 MMHG

## 2024-02-01 LAB
ALBUMIN UR-MCNC: NEGATIVE MG/DL
APPEARANCE UR: CLEAR
BACTERIA #/AREA URNS HPF: ABNORMAL /HPF
BILIRUB UR QL STRIP: NEGATIVE
COLOR UR AUTO: ABNORMAL
GLUCOSE UR STRIP-MCNC: 150 MG/DL
HGB UR QL STRIP: NEGATIVE
KETONES UR STRIP-MCNC: ABNORMAL MG/DL
LEUKOCYTE ESTERASE UR QL STRIP: NEGATIVE
MUCOUS THREADS #/AREA URNS LPF: PRESENT /LPF
NITRATE UR QL: NEGATIVE
PH UR STRIP: 7 [PH] (ref 5–7)
RBC URINE: <1 /HPF
SARS-COV-2 RNA RESP QL NAA+PROBE: NEGATIVE
SP GR UR STRIP: 1.01 (ref 1–1.03)
SQUAMOUS EPITHELIAL: 2 /HPF
TRANSITIONAL EPI: <1 /HPF
UROBILINOGEN UR STRIP-MCNC: NORMAL MG/DL
WBC URINE: 1 /HPF

## 2024-02-01 PROCEDURE — 76819 FETAL BIOPHYS PROFIL W/O NST: CPT

## 2024-02-01 PROCEDURE — G0463 HOSPITAL OUTPT CLINIC VISIT: HCPCS | Mod: 25

## 2024-02-01 PROCEDURE — 96376 TX/PRO/DX INJ SAME DRUG ADON: CPT

## 2024-02-01 PROCEDURE — 96361 HYDRATE IV INFUSION ADD-ON: CPT | Mod: 59

## 2024-02-01 PROCEDURE — 258N000003 HC RX IP 258 OP 636: Performed by: OBSTETRICS & GYNECOLOGY

## 2024-02-01 PROCEDURE — 87635 SARS-COV-2 COVID-19 AMP PRB: CPT | Performed by: OBSTETRICS & GYNECOLOGY

## 2024-02-01 PROCEDURE — 59025 FETAL NON-STRESS TEST: CPT

## 2024-02-01 PROCEDURE — 250N000011 HC RX IP 250 OP 636: Performed by: OBSTETRICS & GYNECOLOGY

## 2024-02-01 PROCEDURE — 250N000011 HC RX IP 250 OP 636

## 2024-02-01 PROCEDURE — 81001 URINALYSIS AUTO W/SCOPE: CPT | Performed by: OBSTETRICS & GYNECOLOGY

## 2024-02-01 PROCEDURE — 96374 THER/PROPH/DIAG INJ IV PUSH: CPT

## 2024-02-01 RX ORDER — PROCHLORPERAZINE 25 MG
25 SUPPOSITORY, RECTAL RECTAL EVERY 12 HOURS PRN
Status: DISCONTINUED | OUTPATIENT
Start: 2024-02-01 | End: 2024-02-01

## 2024-02-01 RX ORDER — ONDANSETRON 2 MG/ML
4 INJECTION INTRAMUSCULAR; INTRAVENOUS EVERY 6 HOURS PRN
Status: DISCONTINUED | OUTPATIENT
Start: 2024-02-01 | End: 2024-02-01 | Stop reason: HOSPADM

## 2024-02-01 RX ORDER — ONDANSETRON 2 MG/ML
INJECTION INTRAMUSCULAR; INTRAVENOUS
Status: COMPLETED
Start: 2024-02-01 | End: 2024-02-01

## 2024-02-01 RX ORDER — DEXTROSE, SODIUM CHLORIDE, SODIUM LACTATE, POTASSIUM CHLORIDE, AND CALCIUM CHLORIDE 5; .6; .31; .03; .02 G/100ML; G/100ML; G/100ML; G/100ML; G/100ML
INJECTION, SOLUTION INTRAVENOUS CONTINUOUS
Status: DISCONTINUED | OUTPATIENT
Start: 2024-02-01 | End: 2024-02-01 | Stop reason: HOSPADM

## 2024-02-01 RX ORDER — PROCHLORPERAZINE MALEATE 5 MG
10 TABLET ORAL EVERY 6 HOURS PRN
Status: DISCONTINUED | OUTPATIENT
Start: 2024-02-01 | End: 2024-02-01

## 2024-02-01 RX ORDER — METOCLOPRAMIDE 10 MG/1
10 TABLET ORAL EVERY 6 HOURS PRN
Status: DISCONTINUED | OUTPATIENT
Start: 2024-02-01 | End: 2024-02-01

## 2024-02-01 RX ORDER — METOCLOPRAMIDE HYDROCHLORIDE 5 MG/ML
10 INJECTION INTRAMUSCULAR; INTRAVENOUS EVERY 6 HOURS PRN
Status: DISCONTINUED | OUTPATIENT
Start: 2024-02-01 | End: 2024-02-01

## 2024-02-01 RX ORDER — ONDANSETRON 4 MG/1
4 TABLET, ORALLY DISINTEGRATING ORAL EVERY 6 HOURS PRN
Status: DISCONTINUED | OUTPATIENT
Start: 2024-02-01 | End: 2024-02-01 | Stop reason: HOSPADM

## 2024-02-01 RX ORDER — ONDANSETRON 2 MG/ML
4 INJECTION INTRAMUSCULAR; INTRAVENOUS ONCE
Status: COMPLETED | OUTPATIENT
Start: 2024-02-01 | End: 2024-02-01

## 2024-02-01 RX ADMIN — SODIUM CHLORIDE, POTASSIUM CHLORIDE, SODIUM LACTATE AND CALCIUM CHLORIDE 1000 ML: 600; 310; 30; 20 INJECTION, SOLUTION INTRAVENOUS at 13:02

## 2024-02-01 RX ADMIN — SODIUM CHLORIDE, SODIUM LACTATE, POTASSIUM CHLORIDE, CALCIUM CHLORIDE AND DEXTROSE MONOHYDRATE: 5; 600; 310; 30; 20 INJECTION, SOLUTION INTRAVENOUS at 11:59

## 2024-02-01 RX ADMIN — ONDANSETRON 4 MG: 2 INJECTION INTRAMUSCULAR; INTRAVENOUS at 14:14

## 2024-02-01 RX ADMIN — ONDANSETRON 4 MG: 2 INJECTION INTRAMUSCULAR; INTRAVENOUS at 12:02

## 2024-02-01 ASSESSMENT — ACTIVITIES OF DAILY LIVING (ADL)
ADLS_ACUITY_SCORE: 22

## 2024-02-01 NOTE — DISCHARGE INSTRUCTIONS
Learning About When to Call Your Doctor During Pregnancy (After 20 Weeks)  Overview  It's common to have concerns about what might be a problem when you're pregnant. Most pregnancies don't have any serious problems. But it's still important to know when to call your doctor if you have certain symptoms or signs of labor.  These are general suggestions. Your doctor may give you some more information about when to call.  When to call your doctor (after 20 weeks)  Call 911  anytime you think you may need emergency care. For example, call if:  You have severe vaginal bleeding. This means you are soaking through a pad each hour for 2 or more hours.  You have sudden, severe pain in your belly.  You have chest pain, are short of breath, or cough up blood.  You passed out (lost consciousness).  You have a seizure.  You see or feel the umbilical cord.  You think you are about to deliver your baby and can't make it safely to the hospital or birthing center.  Call your doctor now or seek immediate medical care if:  You have vaginal bleeding.  You have belly pain.  You have a fever.  You are dizzy or lightheaded, or you feel like you may faint.  You have signs of a blood clot in your leg (called a deep vein thrombosis), such as:  Pain in the calf, back of the knee, thigh, or groin.  Swelling in your leg or groin.  A color change on the leg or groin. The skin may be reddish or purplish, depending on your usual skin color.  You have symptoms of preeclampsia, such as:  Sudden swelling of your face, hands, or feet.  New vision problems (such as dimness, blurring, or seeing spots).  A severe headache.  You have a sudden release of fluid from your vagina. (You think your water broke.)  You've been having regular contractions for an hour. This means that you've had at least 6 contractions within 1 hour, even after you change your position and drink fluids.  You notice that your baby has stopped moving or is moving less than  "normal.  You have signs of heart failure, such as:  New or increased shortness of breath.  New or worse swelling in your legs, ankles, or feet.  Sudden weight gain, such as more than 2 to 3 pounds in a day or 5 pounds in a week.  Feeling so tired or weak that you cannot do your usual activities.  You have symptoms of a urinary tract infection. These may include:  Pain or burning when you urinate.  A frequent need to urinate without being able to pass much urine.  Pain in the flank, which is just below the rib cage and above the waist on either side of the back.  Blood in your urine.  Watch closely for changes in your health, and be sure to contact your doctor if:  You have vaginal discharge that smells bad.  You feel sad, anxious, or hopeless for more than a few days.  You have skin changes, such as a rash, itching, or a yellow color to your skin.  You have other concerns about your pregnancy.  If you have labor signs at 37 weeks or more  If you have signs of labor at 37 weeks or more, your doctor may tell you to call when your labor becomes more active. Symptoms of active labor include:  Contractions that are regular.  Contractions that are less than 5 minutes apart.  Contractions that are hard to talk through.  Follow-up care is a key part of your treatment and safety. Be sure to make and go to all appointments, and call your doctor if you are having problems. It's also a good idea to know your test results and keep a list of the medicines you take.  Where can you learn more?  Go to https://www.[employer].net/patiented  Enter N531 in the search box to learn more about \"Learning About When to Call Your Doctor During Pregnancy (After 20 Weeks).\"  Current as of: July 11, 2023               Content Version: 13.8    [id number] [employer].   Care instructions adapted under license by your healthcare professional. If you have questions about a medical condition or this instruction, always ask your healthcare " professional. [employer] disclaims any warranty or liability for your use of this information.

## 2024-02-01 NOTE — PLAN OF CARE
Goal Outcome Evaluation:  Call to Dr. Bonilla, reviewed results of ultrasound and labs reviewed.  Orders received to discharge patient to home.  Patient is to follow up in clinic next week.  Iv infusion stopped at 1550.

## 2024-02-01 NOTE — PLAN OF CARE
1257 - Dr. Bonilla paged and updated on FHT's including variable decel x2, patient still nauseated.  Orders received for BPP to check for CHANTEL and one liter of LR bolus.  Patient updated on POC and agrees.    1302 - liter of LR infusing

## 2024-02-01 NOTE — PLAN OF CARE
Goal Outcome Evaluation:  Patient discharged to home ambulatory discharge instructions given, patient verbalizes understanding that she is to schedule an appointment in her clinic to have a BPP next week

## 2024-02-01 NOTE — PLAN OF CARE
1116 - at 37+3/7 weeks presents to [facility] from home for eval and treatment of nausea, vomiting and diarrhea.  Dr. Bonilla paged at patient's arrival for orders.  Awaiting return call.  POC discussed including monitoring, possible lab testing and probable IV hydration.  Patient verbally consents.  VSS; afebrile.  Monitors applied.  Admission database completed. Patient states she is not having contractions, leaking of fluid or vaginal bleeding.  Patient reports fetal movement as active.  Patient states she last kept solid food down at lunchtime yesterday. She has also been able to drink water, but not able to keep down gatorade or propel water.    1134 - Dr. Bonilla paged and updated via phone re: patient's arrival, current complaints, medical and pregnancy history, admission assessment, FHT's and uterine activity as reported by patient.  Orders received.  Patient agrees to POC.    1142 - Previously placed toco not tracing any uterine activity or change in pressure.  New toco placed.    1159 -IV started.  D5LR infusing.  Zofran given at 1202 per MD order.  Covid swab collected and sent to lab.  Awaiting patient's urge to empty bladder for urine test.

## 2024-02-04 ENCOUNTER — HEALTH MAINTENANCE LETTER (OUTPATIENT)
Age: 29
End: 2024-02-04

## 2024-02-05 ENCOUNTER — TRANSFERRED RECORDS (OUTPATIENT)
Dept: OBGYN | Facility: CLINIC | Age: 29
End: 2024-02-05

## 2024-02-05 ENCOUNTER — HOSPITAL ENCOUNTER (INPATIENT)
Facility: CLINIC | Age: 29
LOS: 3 days | Discharge: HOME | End: 2024-02-08
Attending: OBSTETRICS & GYNECOLOGY | Admitting: OBSTETRICS & GYNECOLOGY
Payer: COMMERCIAL

## 2024-02-05 DIAGNOSIS — Z37.9 VACUUM-ASSISTED VAGINAL DELIVERY: Primary | ICD-10-CM

## 2024-02-05 LAB
ABO/RH(D): NORMAL
ANTIBODY SCREEN: NEGATIVE
ERYTHROCYTE [DISTWIDTH] IN BLOOD BY AUTOMATED COUNT: 14.2 % (ref 10–15)
HCT VFR BLD AUTO: 36.8 % (ref 35–47)
HGB BLD-MCNC: 12.3 G/DL (ref 11.7–15.7)
HOLD SPECIMEN: NORMAL
MCH RBC QN AUTO: 28.6 PG (ref 26.5–33)
MCHC RBC AUTO-ENTMCNC: 33.4 G/DL (ref 31.5–36.5)
MCV RBC AUTO: 86 FL (ref 78–100)
PLATELET # BLD AUTO: 229 10E3/UL (ref 150–450)
RBC # BLD AUTO: 4.3 10E6/UL (ref 3.8–5.2)
SPECIMEN EXPIRATION DATE: NORMAL
T PALLIDUM AB SER QL: NONREACTIVE
WBC # BLD AUTO: 9.7 10E3/UL (ref 4–11)

## 2024-02-05 PROCEDURE — 36415 COLL VENOUS BLD VENIPUNCTURE: CPT | Performed by: OBSTETRICS & GYNECOLOGY

## 2024-02-05 PROCEDURE — 3E033VJ INTRODUCTION OF OTHER HORMONE INTO PERIPHERAL VEIN, PERCUTANEOUS APPROACH: ICD-10-PCS | Performed by: OBSTETRICS & GYNECOLOGY

## 2024-02-05 PROCEDURE — 86900 BLOOD TYPING SEROLOGIC ABO: CPT | Performed by: OBSTETRICS & GYNECOLOGY

## 2024-02-05 PROCEDURE — 120N000001 HC R&B MED SURG/OB

## 2024-02-05 PROCEDURE — 86780 TREPONEMA PALLIDUM: CPT | Performed by: OBSTETRICS & GYNECOLOGY

## 2024-02-05 PROCEDURE — 3E0P7VZ INTRODUCTION OF HORMONE INTO FEMALE REPRODUCTIVE, VIA NATURAL OR ARTIFICIAL OPENING: ICD-10-PCS | Performed by: OBSTETRICS & GYNECOLOGY

## 2024-02-05 PROCEDURE — 250N000011 HC RX IP 250 OP 636: Performed by: OBSTETRICS & GYNECOLOGY

## 2024-02-05 PROCEDURE — 250N000013 HC RX MED GY IP 250 OP 250 PS 637: Performed by: OBSTETRICS & GYNECOLOGY

## 2024-02-05 PROCEDURE — 85027 COMPLETE CBC AUTOMATED: CPT | Performed by: OBSTETRICS & GYNECOLOGY

## 2024-02-05 RX ORDER — NALOXONE HYDROCHLORIDE 0.4 MG/ML
0.2 INJECTION, SOLUTION INTRAMUSCULAR; INTRAVENOUS; SUBCUTANEOUS
Status: DISCONTINUED | OUTPATIENT
Start: 2024-02-05 | End: 2024-02-06 | Stop reason: HOSPADM

## 2024-02-05 RX ORDER — PROCHLORPERAZINE 25 MG
25 SUPPOSITORY, RECTAL RECTAL EVERY 12 HOURS PRN
Status: DISCONTINUED | OUTPATIENT
Start: 2024-02-05 | End: 2024-02-06 | Stop reason: HOSPADM

## 2024-02-05 RX ORDER — ONDANSETRON 4 MG/1
4 TABLET, ORALLY DISINTEGRATING ORAL EVERY 6 HOURS PRN
Status: DISCONTINUED | OUTPATIENT
Start: 2024-02-05 | End: 2024-02-06 | Stop reason: HOSPADM

## 2024-02-05 RX ORDER — MISOPROSTOL 200 UG/1
400 TABLET ORAL
Status: DISCONTINUED | OUTPATIENT
Start: 2024-02-05 | End: 2024-02-06 | Stop reason: HOSPADM

## 2024-02-05 RX ORDER — MISOPROSTOL 200 UG/1
800 TABLET ORAL
Status: DISCONTINUED | OUTPATIENT
Start: 2024-02-05 | End: 2024-02-06 | Stop reason: HOSPADM

## 2024-02-05 RX ORDER — OXYTOCIN/0.9 % SODIUM CHLORIDE 30/500 ML
100-340 PLASTIC BAG, INJECTION (ML) INTRAVENOUS CONTINUOUS PRN
Status: DISCONTINUED | OUTPATIENT
Start: 2024-02-05 | End: 2024-02-06

## 2024-02-05 RX ORDER — PROCHLORPERAZINE MALEATE 5 MG
10 TABLET ORAL EVERY 6 HOURS PRN
Status: DISCONTINUED | OUTPATIENT
Start: 2024-02-05 | End: 2024-02-06 | Stop reason: HOSPADM

## 2024-02-05 RX ORDER — CARBOPROST TROMETHAMINE 250 UG/ML
250 INJECTION, SOLUTION INTRAMUSCULAR
Status: DISCONTINUED | OUTPATIENT
Start: 2024-02-05 | End: 2024-02-06 | Stop reason: HOSPADM

## 2024-02-05 RX ORDER — NALOXONE HYDROCHLORIDE 0.4 MG/ML
0.4 INJECTION, SOLUTION INTRAMUSCULAR; INTRAVENOUS; SUBCUTANEOUS
Status: DISCONTINUED | OUTPATIENT
Start: 2024-02-05 | End: 2024-02-06 | Stop reason: HOSPADM

## 2024-02-05 RX ORDER — KETOROLAC TROMETHAMINE 30 MG/ML
30 INJECTION, SOLUTION INTRAMUSCULAR; INTRAVENOUS
Status: DISCONTINUED | OUTPATIENT
Start: 2024-02-05 | End: 2024-02-06

## 2024-02-05 RX ORDER — METOCLOPRAMIDE 10 MG/1
10 TABLET ORAL EVERY 6 HOURS PRN
Status: DISCONTINUED | OUTPATIENT
Start: 2024-02-05 | End: 2024-02-06 | Stop reason: HOSPADM

## 2024-02-05 RX ORDER — CITRIC ACID/SODIUM CITRATE 334-500MG
30 SOLUTION, ORAL ORAL
Status: COMPLETED | OUTPATIENT
Start: 2024-02-05 | End: 2024-02-06

## 2024-02-05 RX ORDER — IBUPROFEN 400 MG/1
800 TABLET, FILM COATED ORAL
Status: DISCONTINUED | OUTPATIENT
Start: 2024-02-05 | End: 2024-02-06

## 2024-02-05 RX ORDER — TERBUTALINE SULFATE 1 MG/ML
0.25 INJECTION, SOLUTION SUBCUTANEOUS
Status: DISCONTINUED | OUTPATIENT
Start: 2024-02-05 | End: 2024-02-06 | Stop reason: HOSPADM

## 2024-02-05 RX ORDER — METHYLERGONOVINE MALEATE 0.2 MG/ML
200 INJECTION INTRAVENOUS
Status: DISCONTINUED | OUTPATIENT
Start: 2024-02-05 | End: 2024-02-06 | Stop reason: HOSPADM

## 2024-02-05 RX ORDER — OXYTOCIN 10 [USP'U]/ML
10 INJECTION, SOLUTION INTRAMUSCULAR; INTRAVENOUS
Status: DISCONTINUED | OUTPATIENT
Start: 2024-02-05 | End: 2024-02-06

## 2024-02-05 RX ORDER — TRANEXAMIC ACID 10 MG/ML
1 INJECTION, SOLUTION INTRAVENOUS EVERY 30 MIN PRN
Status: DISCONTINUED | OUTPATIENT
Start: 2024-02-05 | End: 2024-02-06 | Stop reason: HOSPADM

## 2024-02-05 RX ORDER — FENTANYL CITRATE 50 UG/ML
50 INJECTION, SOLUTION INTRAMUSCULAR; INTRAVENOUS EVERY 30 MIN PRN
Status: DISCONTINUED | OUTPATIENT
Start: 2024-02-05 | End: 2024-02-06 | Stop reason: HOSPADM

## 2024-02-05 RX ORDER — ONDANSETRON 2 MG/ML
4 INJECTION INTRAMUSCULAR; INTRAVENOUS EVERY 6 HOURS PRN
Status: DISCONTINUED | OUTPATIENT
Start: 2024-02-05 | End: 2024-02-06 | Stop reason: HOSPADM

## 2024-02-05 RX ORDER — LIDOCAINE 40 MG/G
CREAM TOPICAL
Status: DISCONTINUED | OUTPATIENT
Start: 2024-02-05 | End: 2024-02-06 | Stop reason: HOSPADM

## 2024-02-05 RX ORDER — LOPERAMIDE HCL 2 MG
4 CAPSULE ORAL
Status: DISCONTINUED | OUTPATIENT
Start: 2024-02-05 | End: 2024-02-06 | Stop reason: HOSPADM

## 2024-02-05 RX ORDER — OXYTOCIN/0.9 % SODIUM CHLORIDE 30/500 ML
340 PLASTIC BAG, INJECTION (ML) INTRAVENOUS CONTINUOUS PRN
Status: DISCONTINUED | OUTPATIENT
Start: 2024-02-05 | End: 2024-02-06 | Stop reason: HOSPADM

## 2024-02-05 RX ORDER — HYDROXYZINE HYDROCHLORIDE 25 MG/1
50-100 TABLET, FILM COATED ORAL
Status: DISCONTINUED | OUTPATIENT
Start: 2024-02-05 | End: 2024-02-08 | Stop reason: HOSPADM

## 2024-02-05 RX ORDER — SODIUM CHLORIDE, SODIUM LACTATE, POTASSIUM CHLORIDE, CALCIUM CHLORIDE 600; 310; 30; 20 MG/100ML; MG/100ML; MG/100ML; MG/100ML
INJECTION, SOLUTION INTRAVENOUS CONTINUOUS
Status: DISCONTINUED | OUTPATIENT
Start: 2024-02-05 | End: 2024-02-06 | Stop reason: HOSPADM

## 2024-02-05 RX ORDER — LOPERAMIDE HCL 2 MG
2 CAPSULE ORAL
Status: DISCONTINUED | OUTPATIENT
Start: 2024-02-05 | End: 2024-02-06 | Stop reason: HOSPADM

## 2024-02-05 RX ORDER — METOCLOPRAMIDE HYDROCHLORIDE 5 MG/ML
10 INJECTION INTRAMUSCULAR; INTRAVENOUS EVERY 6 HOURS PRN
Status: DISCONTINUED | OUTPATIENT
Start: 2024-02-05 | End: 2024-02-06 | Stop reason: HOSPADM

## 2024-02-05 RX ORDER — SERTRALINE HYDROCHLORIDE 25 MG/1
25 TABLET, FILM COATED ORAL DAILY
Status: DISCONTINUED | OUTPATIENT
Start: 2024-02-05 | End: 2024-02-08 | Stop reason: HOSPADM

## 2024-02-05 RX ORDER — ACETAMINOPHEN 325 MG/1
325-650 TABLET ORAL EVERY 4 HOURS PRN
Status: DISCONTINUED | OUTPATIENT
Start: 2024-02-05 | End: 2024-02-06

## 2024-02-05 RX ORDER — OXYTOCIN 10 [USP'U]/ML
10 INJECTION, SOLUTION INTRAMUSCULAR; INTRAVENOUS
Status: DISCONTINUED | OUTPATIENT
Start: 2024-02-05 | End: 2024-02-06 | Stop reason: HOSPADM

## 2024-02-05 RX ADMIN — ACETAMINOPHEN 650 MG: 325 TABLET, FILM COATED ORAL at 20:04

## 2024-02-05 RX ADMIN — SERTRALINE HYDROCHLORIDE 25 MG: 25 TABLET ORAL at 18:14

## 2024-02-05 RX ADMIN — DINOPROSTONE 10 MG: 10 INSERT VAGINAL at 13:11

## 2024-02-05 RX ADMIN — ONDANSETRON 4 MG: 2 INJECTION INTRAMUSCULAR; INTRAVENOUS at 19:56

## 2024-02-05 ASSESSMENT — ACTIVITIES OF DAILY LIVING (ADL)
ADLS_ACUITY_SCORE: 20

## 2024-02-05 NOTE — PROVIDER NOTIFICATION
02/05/24 1712   Provider Notification   Provider Name/Title Dr Villalobos   Method of Notification Phone   Request Evaluate - Remote   Notification Reason Status Update     Update that Cervidil placed at 1315, patient is feeling some cramping intermittently but nothing uncomfortable. FHR has been category 1 since arrival.     Patient has not taken her Zoloft today yet.     Okay to reorder Zoloft.     Dr Parra taking over.

## 2024-02-05 NOTE — PLAN OF CARE
Patient arrived ambulatory from the clinic for induction after having a BPP of 4/8 today.

## 2024-02-06 ENCOUNTER — ANESTHESIA (OUTPATIENT)
Dept: OBGYN | Facility: CLINIC | Age: 29
End: 2024-02-06
Payer: COMMERCIAL

## 2024-02-06 ENCOUNTER — ANESTHESIA EVENT (OUTPATIENT)
Dept: OBGYN | Facility: CLINIC | Age: 29
End: 2024-02-06
Payer: COMMERCIAL

## 2024-02-06 PROCEDURE — 250N000013 HC RX MED GY IP 250 OP 250 PS 637: Performed by: OBSTETRICS & GYNECOLOGY

## 2024-02-06 PROCEDURE — 250N000011 HC RX IP 250 OP 636: Performed by: ANESTHESIOLOGY

## 2024-02-06 PROCEDURE — 00HU33Z INSERTION OF INFUSION DEVICE INTO SPINAL CANAL, PERCUTANEOUS APPROACH: ICD-10-PCS | Performed by: ANESTHESIOLOGY

## 2024-02-06 PROCEDURE — 250N000009 HC RX 250: Performed by: OBSTETRICS & GYNECOLOGY

## 2024-02-06 PROCEDURE — 722N000001 HC LABOR CARE VAGINAL DELIVERY SINGLE

## 2024-02-06 PROCEDURE — 3E0R3BZ INTRODUCTION OF ANESTHETIC AGENT INTO SPINAL CANAL, PERCUTANEOUS APPROACH: ICD-10-PCS | Performed by: ANESTHESIOLOGY

## 2024-02-06 PROCEDURE — 120N000012 HC R&B POSTPARTUM

## 2024-02-06 PROCEDURE — 258N000003 HC RX IP 258 OP 636: Performed by: OBSTETRICS & GYNECOLOGY

## 2024-02-06 PROCEDURE — 0KQM0ZZ REPAIR PERINEUM MUSCLE, OPEN APPROACH: ICD-10-PCS | Performed by: OBSTETRICS & GYNECOLOGY

## 2024-02-06 PROCEDURE — 370N000003 HC ANESTHESIA WARD SERVICE: Performed by: ANESTHESIOLOGY

## 2024-02-06 PROCEDURE — 0UQMXZZ REPAIR VULVA, EXTERNAL APPROACH: ICD-10-PCS | Performed by: OBSTETRICS & GYNECOLOGY

## 2024-02-06 RX ORDER — LIDOCAINE 40 MG/G
CREAM TOPICAL
Status: DISCONTINUED | OUTPATIENT
Start: 2024-02-06 | End: 2024-02-06 | Stop reason: HOSPADM

## 2024-02-06 RX ORDER — OXYTOCIN/0.9 % SODIUM CHLORIDE 30/500 ML
1-24 PLASTIC BAG, INJECTION (ML) INTRAVENOUS CONTINUOUS
Status: DISCONTINUED | OUTPATIENT
Start: 2024-02-06 | End: 2024-02-06 | Stop reason: HOSPADM

## 2024-02-06 RX ORDER — OXYTOCIN/0.9 % SODIUM CHLORIDE 30/500 ML
340 PLASTIC BAG, INJECTION (ML) INTRAVENOUS CONTINUOUS PRN
Status: DISCONTINUED | OUTPATIENT
Start: 2024-02-06 | End: 2024-02-08 | Stop reason: HOSPADM

## 2024-02-06 RX ORDER — MISOPROSTOL 200 UG/1
800 TABLET ORAL
Status: DISCONTINUED | OUTPATIENT
Start: 2024-02-06 | End: 2024-02-08 | Stop reason: HOSPADM

## 2024-02-06 RX ORDER — TRANEXAMIC ACID 10 MG/ML
1 INJECTION, SOLUTION INTRAVENOUS EVERY 30 MIN PRN
Status: DISCONTINUED | OUTPATIENT
Start: 2024-02-06 | End: 2024-02-08 | Stop reason: HOSPADM

## 2024-02-06 RX ORDER — DOCUSATE SODIUM 100 MG/1
100 CAPSULE, LIQUID FILLED ORAL DAILY
Status: DISCONTINUED | OUTPATIENT
Start: 2024-02-06 | End: 2024-02-08 | Stop reason: HOSPADM

## 2024-02-06 RX ORDER — SODIUM CHLORIDE, SODIUM LACTATE, POTASSIUM CHLORIDE, CALCIUM CHLORIDE 600; 310; 30; 20 MG/100ML; MG/100ML; MG/100ML; MG/100ML
INJECTION, SOLUTION INTRAVENOUS CONTINUOUS PRN
Status: DISCONTINUED | OUTPATIENT
Start: 2024-02-06 | End: 2024-02-06 | Stop reason: HOSPADM

## 2024-02-06 RX ORDER — EPHEDRINE SULFATE 50 MG/ML
5 INJECTION, SOLUTION INTRAMUSCULAR; INTRAVENOUS; SUBCUTANEOUS
Status: DISCONTINUED | OUTPATIENT
Start: 2024-02-06 | End: 2024-02-06 | Stop reason: HOSPADM

## 2024-02-06 RX ORDER — NALBUPHINE HYDROCHLORIDE 20 MG/ML
2.5-5 INJECTION, SOLUTION INTRAMUSCULAR; INTRAVENOUS; SUBCUTANEOUS EVERY 6 HOURS PRN
Status: DISCONTINUED | OUTPATIENT
Start: 2024-02-06 | End: 2024-02-08 | Stop reason: HOSPADM

## 2024-02-06 RX ORDER — CARBOPROST TROMETHAMINE 250 UG/ML
250 INJECTION, SOLUTION INTRAMUSCULAR
Status: DISCONTINUED | OUTPATIENT
Start: 2024-02-06 | End: 2024-02-08 | Stop reason: HOSPADM

## 2024-02-06 RX ORDER — NALOXONE HYDROCHLORIDE 0.4 MG/ML
0.4 INJECTION, SOLUTION INTRAMUSCULAR; INTRAVENOUS; SUBCUTANEOUS
Status: DISCONTINUED | OUTPATIENT
Start: 2024-02-06 | End: 2024-02-08 | Stop reason: HOSPADM

## 2024-02-06 RX ORDER — HYDROCORTISONE 25 MG/G
CREAM TOPICAL 3 TIMES DAILY PRN
Status: DISCONTINUED | OUTPATIENT
Start: 2024-02-06 | End: 2024-02-08 | Stop reason: HOSPADM

## 2024-02-06 RX ORDER — IBUPROFEN 400 MG/1
800 TABLET, FILM COATED ORAL EVERY 6 HOURS PRN
Status: DISCONTINUED | OUTPATIENT
Start: 2024-02-06 | End: 2024-02-08 | Stop reason: HOSPADM

## 2024-02-06 RX ORDER — NALOXONE HYDROCHLORIDE 0.4 MG/ML
0.2 INJECTION, SOLUTION INTRAMUSCULAR; INTRAVENOUS; SUBCUTANEOUS
Status: DISCONTINUED | OUTPATIENT
Start: 2024-02-06 | End: 2024-02-08 | Stop reason: HOSPADM

## 2024-02-06 RX ORDER — FENTANYL CITRATE 50 UG/ML
100 INJECTION, SOLUTION INTRAMUSCULAR; INTRAVENOUS ONCE
Status: COMPLETED | OUTPATIENT
Start: 2024-02-06 | End: 2024-02-06

## 2024-02-06 RX ORDER — ONDANSETRON 2 MG/ML
4 INJECTION INTRAMUSCULAR; INTRAVENOUS EVERY 6 HOURS PRN
Status: DISCONTINUED | OUTPATIENT
Start: 2024-02-06 | End: 2024-02-06 | Stop reason: HOSPADM

## 2024-02-06 RX ORDER — MISOPROSTOL 200 UG/1
400 TABLET ORAL
Status: DISCONTINUED | OUTPATIENT
Start: 2024-02-06 | End: 2024-02-08 | Stop reason: HOSPADM

## 2024-02-06 RX ORDER — ROPIVACAINE HYDROCHLORIDE 2 MG/ML
INJECTION, SOLUTION EPIDURAL; INFILTRATION; PERINEURAL
Status: COMPLETED | OUTPATIENT
Start: 2024-02-06 | End: 2024-02-06

## 2024-02-06 RX ORDER — ACETAMINOPHEN 325 MG/1
650 TABLET ORAL EVERY 4 HOURS PRN
Status: DISCONTINUED | OUTPATIENT
Start: 2024-02-06 | End: 2024-02-08 | Stop reason: HOSPADM

## 2024-02-06 RX ORDER — ROPIVACAINE HYDROCHLORIDE 2 MG/ML
10 INJECTION, SOLUTION EPIDURAL; INFILTRATION; PERINEURAL ONCE
Status: DISCONTINUED | OUTPATIENT
Start: 2024-02-06 | End: 2024-02-06 | Stop reason: HOSPADM

## 2024-02-06 RX ORDER — LOPERAMIDE HCL 2 MG
2 CAPSULE ORAL
Status: DISCONTINUED | OUTPATIENT
Start: 2024-02-06 | End: 2024-02-08 | Stop reason: HOSPADM

## 2024-02-06 RX ORDER — OXYCODONE HYDROCHLORIDE 5 MG/1
5 TABLET ORAL EVERY 6 HOURS PRN
Status: DISCONTINUED | OUTPATIENT
Start: 2024-02-06 | End: 2024-02-08

## 2024-02-06 RX ORDER — ONDANSETRON 4 MG/1
4 TABLET, ORALLY DISINTEGRATING ORAL EVERY 6 HOURS PRN
Status: DISCONTINUED | OUTPATIENT
Start: 2024-02-06 | End: 2024-02-06 | Stop reason: HOSPADM

## 2024-02-06 RX ORDER — OXYTOCIN 10 [USP'U]/ML
10 INJECTION, SOLUTION INTRAMUSCULAR; INTRAVENOUS
Status: DISCONTINUED | OUTPATIENT
Start: 2024-02-06 | End: 2024-02-08 | Stop reason: HOSPADM

## 2024-02-06 RX ORDER — METHYLERGONOVINE MALEATE 0.2 MG/ML
200 INJECTION INTRAVENOUS
Status: DISCONTINUED | OUTPATIENT
Start: 2024-02-06 | End: 2024-02-08 | Stop reason: HOSPADM

## 2024-02-06 RX ORDER — LOPERAMIDE HCL 2 MG
4 CAPSULE ORAL
Status: DISCONTINUED | OUTPATIENT
Start: 2024-02-06 | End: 2024-02-08 | Stop reason: HOSPADM

## 2024-02-06 RX ORDER — MODIFIED LANOLIN
OINTMENT (GRAM) TOPICAL
Status: DISCONTINUED | OUTPATIENT
Start: 2024-02-06 | End: 2024-02-08 | Stop reason: HOSPADM

## 2024-02-06 RX ORDER — BISACODYL 10 MG
10 SUPPOSITORY, RECTAL RECTAL DAILY PRN
Status: DISCONTINUED | OUTPATIENT
Start: 2024-02-06 | End: 2024-02-08 | Stop reason: HOSPADM

## 2024-02-06 RX ADMIN — SODIUM CHLORIDE, POTASSIUM CHLORIDE, SODIUM LACTATE AND CALCIUM CHLORIDE 1000 ML: 600; 310; 30; 20 INJECTION, SOLUTION INTRAVENOUS at 02:46

## 2024-02-06 RX ADMIN — ACETAMINOPHEN 650 MG: 325 TABLET, FILM COATED ORAL at 22:08

## 2024-02-06 RX ADMIN — OXYCODONE HYDROCHLORIDE 5 MG: 5 TABLET ORAL at 23:28

## 2024-02-06 RX ADMIN — FENTANYL CITRATE 100 MCG: 50 INJECTION INTRAMUSCULAR; INTRAVENOUS at 05:42

## 2024-02-06 RX ADMIN — Medication: at 16:48

## 2024-02-06 RX ADMIN — ROPIVACAINE HYDROCHLORIDE 8 ML: 2 INJECTION, SOLUTION EPIDURAL; INFILTRATION at 05:42

## 2024-02-06 RX ADMIN — Medication: at 03:51

## 2024-02-06 RX ADMIN — Medication 340 ML/HR: at 18:58

## 2024-02-06 RX ADMIN — Medication: at 10:15

## 2024-02-06 RX ADMIN — Medication 2 MILLI-UNITS/MIN: at 15:51

## 2024-02-06 RX ADMIN — SODIUM CITRATE AND CITRIC ACID MONOHYDRATE 30 ML: 500; 334 SOLUTION ORAL at 10:39

## 2024-02-06 RX ADMIN — ROPIVACAINE HYDROCHLORIDE 10 ML: 2 INJECTION, SOLUTION EPIDURAL; INFILTRATION at 04:07

## 2024-02-06 RX ADMIN — IBUPROFEN 800 MG: 400 TABLET ORAL at 20:08

## 2024-02-06 RX ADMIN — BENZOCAINE: 11.4 AEROSOL, SPRAY TOPICAL at 23:29

## 2024-02-06 ASSESSMENT — ACTIVITIES OF DAILY LIVING (ADL)
ADLS_ACUITY_SCORE: 21
ADLS_ACUITY_SCORE: 20
ADLS_ACUITY_SCORE: 21
ADLS_ACUITY_SCORE: 20
ADLS_ACUITY_SCORE: 21
ADLS_ACUITY_SCORE: 20
ADLS_ACUITY_SCORE: 21
ADLS_ACUITY_SCORE: 20

## 2024-02-06 NOTE — PROGRESS NOTES
OB Progress Note  2024  7:47 AM    S:  Pt with satisfactory pain control after second epidural.  No other complaints.    Cervidil was removed at 0158 hrs. cervix 1.5/70/-2 patient nancy every 2 to 3 minutes.  Observation and continuous monitoring      O:  BP 95/52   Temp 97.5  F (36.4  C)   Resp 16   SpO2 95%   EFM: baseline 125, accelerations present, absent decelerations, moderate variability; Category 1  [place]:  Ctx q 2-3 min  SVE: 2-3 /70-80%/-2 vertex  Membranes: Intact    A/P:  28 year old  @38w1d admitted for cervical ripening and induction of labor for oligohydramnios and a biophysical profile of 4 out of 8, reactive NST (6 out of 10) group B strep negative.  Status post Cervidil cervical ripening, now with favorable cervix to initiate induction.    1.  Routine cares  2.  Start intravenous Pitocin induction of labor, amniotomy when appropriate.  3.  Continuous uterine and fetal monitoring  4.  Anticipate normal vaginal delivery later this afternoon/evening  5.  Patient and partner aware of plan of care and agree.    Mario Parra MD

## 2024-02-06 NOTE — PROGRESS NOTES
OB Progress Note  2024  Petrona Mays MD      S:  Pain well controlled with epidural.    O:  /73   Temp 97.9  F (36.6  C) (Temporal)   Resp 16   SpO2 91%   EFM: baseline 115, accelerations present, no decelerations, moderate variability; Category 1  [place]:  Ctx q 2-3 min  SVE: 10/100/0  Membranes: AROM, clear @1325    A/P:  28 year old  @38w1d admitted for cervical ripening and induction of labor for oligohydramnios and a biophysical profile of 4 out of 8, reactive NST (6 out of 10) group B strep negative.     Labor:  - s/p cervidil, active labor  - s/p AROM - complete. Pt requests to labor down for 30 min  - Pain: well controlled with epidural    FWB: Category I, reactive. Moderate variability, accels present  - Continuous monitoring     Prenatal:  - GBS neg  - Rh pos    Petrona Mays MD  2024

## 2024-02-06 NOTE — PROVIDER NOTIFICATION
02/06/24 1325   Provider Notification   Provider Name/Title Davon   Method of Notification At Bedside   Request Evaluate in Person     Dr. Mays at bedside, SVE complete, AROM with clear fluid. Pt to throne position. Will start pushing in 30 minutes.

## 2024-02-06 NOTE — PROVIDER NOTIFICATION
02/05/24 1948   Provider Notification   Provider Name/Title Quegert   Method of Notification Electronic Page;Phone   Request Evaluate - Remote   Notification Reason Pain     Patient has a headache, ok to give tylenol?    MD was not on call. No page return.

## 2024-02-06 NOTE — PROVIDER NOTIFICATION
02/06/24 1008   Provider Notification   Provider Name/Title Davon   Method of Notification Electronic Page   Request Evaluate-Remote   Notification Reason Status Update     FYI patient is 9cm % BBOW, small amount of bloody show. Fetal baseline 115, moderate variability.

## 2024-02-06 NOTE — PROVIDER NOTIFICATION
02/06/24 0158   Provider Notification   Provider Name/Title Fidel   Method of Notification Electronic Page;Phone   Request Evaluate - Remote   Notification Reason SVE;Status Update     SVE 1.5/70/-2, mid & medium, tejada = 6, how would you like to continue? Thanks!  MD said to recheck pt in a couple hours and start pit if not progressing adequately. All pain measures ok. Call back with update after recheck.

## 2024-02-06 NOTE — PROGRESS NOTES
OB Progress Note  2024  10:42 AM    S:  Pain well controlled with epidural.    O:  /62   Temp 97.5  F (36.4  C) (Temporal)   Resp 16   SpO2 97%   EFM: baseline 115, accelerations present, variable decelerations, moderate variability; Category 1  [place]:  Ctx q 2-3 min  SVE: 9/100/0  Membranes: Intact    A/P:  28 year old  @38w1d admitted for cervical ripening and induction of labor for oligohydramnios and a biophysical profile of 4 out of 8, reactive NST (6 out of 10) group B strep negative.     Labor:  - s/p cervidil, active labor  - Pain: well controlled with epidural    FWB: Category II, reactive. Variable decels, overall lower baseline with moderate variability  - Continuous monitoring  - Discussed AROM and application of FSE for monitoring due to decels, pt declines at this time. Able to return to external monitoring - discussed if having trouble monitoring would strongly recommend, pt would be agreeable.     Prenatal:  - GBS neg  - Rh pos    Petrona Mays MD  2024

## 2024-02-06 NOTE — PROVIDER NOTIFICATION
02/05/24 1938   Provider Notification   Provider Name/Title Fidel   Method of Notification Electronic Page;Phone   Request Evaluate - Remote   Notification Reason Pain     Patient has a headache, ok to give tylenol?  Per MD yes, and he is on call for the night.

## 2024-02-06 NOTE — PROVIDER NOTIFICATION
02/06/24 1305   Provider Notification   Provider Name/Title Davon   Method of Notification Electronic Page   Notification Reason SVE     Text message sent with SVE 10/100%-1. Unsure of AROM at this time. Pt will discuss with [place] and eddy.

## 2024-02-06 NOTE — PROVIDER NOTIFICATION
02/05/24 1734   Fetal Assessment   Fetal HR Assessment Method migdalia   Fetal HR (beats/min) 125   Fetal HR Baseline normal range   Fetal HR Variability moderate (amplitude range 6 to 25 bpm)   Fetal HR Accelerations increase 15 bpm above baseline lasting 15 seconds   Fetal HR Decelerations other (see comments)     Attempting to have Migdalia monitor work as patient is requesting to ambulate and use birthing ball. RN at bedside attempting to get signal to work if patient it out of bed. Attempted to change the sticker without success. After sticker was changed and still signal was low, RN explained to patient that we will have to attempt to use the portable external monitor instead. Migdalia removed and externals replaced.

## 2024-02-06 NOTE — PROVIDER NOTIFICATION
02/06/24 1030   Provider Notification   Provider Name/Title Davon   Method of Notification At Bedside   Request Evaluate in Person       Loss of capture of FHR attempted to adjust, unsuccessful. Patient repositioned to left side. IV fluid bolus started. Provider at bedside. SVE by provider- 9cm BBOW. Pt declines AROM for scalp electrode. FHR back at baseline 115 moderate variability POC reviewed with pt. Support given.

## 2024-02-06 NOTE — H&P
Kathi Enriquez  [id number]  OB Admit History & Physical      HPI:  Ms. Enriquez  is a 28 year old  @ 38w0d by dates, confirmed by ultrasound who presented to L&D for cervical ripening prior to planned induction of labor for a BPP of 4/8 (0 points for fluid volume and fetal breathing movements), but a reactive NST (6/10)    Cervidil was placed after admission earlier today at 1315 hours. .      Prenatal course:  1st visit at 8 3/7 weeks, regular care, TWG 43 pounds.    Pregnancy complications: Mixed anxiety and depressive disorder, (on medications), attention deficit hyperactivity disorder (no medications) elevated 1 hour glucose, 1 abnormal value on 3-hour GTT, history of eating disorder (bulimia), history of ectopic pregnancy    Ultrasound at 8 weeks 3 days confirms aguilar IUP, consistent with LMP    Anatomy scan at 20 weeks, normal other than isolated echogenic intraventricular focus.  (Normal MT 21).  No previa    Growth ultrasound at 28 weeks gestation EFW 42nd percentile, BPD 90th percentile    Growth ultrasound at 36 weeks EFW 6 pounds (39.9 percentile), MVP 3.5, CHANTEL 6.5    Follow-up ultrasound for borderline low fluid at 36+ weeks gestation shows BPP 8 out of 8, CHANTEL 16, MVP 6    Ultrasound for fluid volume 2024 at 38 weeks gestation: CHANTEL 4.58, BPP 4 out of 8, 0 points for fluid volume and movement.  NST reactive.  Plan for cervical ripening and induction to follow.        Prenatal labs:  A POS, antibody screen negative,  Rubella immune immune, Hep B/HIV/RPR all negative, GC/CT negative, , 3-hour GTT 1 abnormal value otherwise normal.,  GBS negative; genetic screening tests Materni T21 test normal    OB history:   OB History    Para Term  AB Living   3 0 0 0 2 0   SAB IAB Ectopic Multiple Live Births   0 1 1 0 0      # Outcome Date GA Lbr Dameon/2nd Weight Sex Delivery Anes PTL Lv   3 Current            2 Ectopic 22              Birth Comments: METHOTREXATE FOR RT  4CM ECTOPIC WITH APPROPRIATE RESPONSE   1 IAB 21                 PMHx:     Past Medical History:   Diagnosis Date    Anxiety     Deliberate self-cutting     Depressive disorder     Insomnia     Major depressive disorder, recurrent episode, moderate (H) 2016    Panic        PSHX:    Past Surgical History:   Procedure Laterality Date    COLONOSCOPY      NO HISTORY OF SURGERY         Meds:    No current outpatient medications on file.       Allergies: Nickel      REVIEW OF SYSTEMS:  Positives and negatives in HPI.     SocHx:    Social History     Socioeconomic History    Marital status: Single     Spouse name: Not on file    Number of children: Not on file    Years of education: Not on file    Highest education level: Not on file   Occupational History    Not on file   Tobacco Use    Smoking status: Former     Packs/day: .25     Types: Cigarettes     Quit date: 2023     Years since quittin.7    Smokeless tobacco: Never    Tobacco comments:     smokes 2 or 3 a day    Vaping Use    Vaping Use: Never used   Substance and Sexual Activity    Alcohol use: Not Currently    Drug use: No    Sexual activity: Yes     Partners: Male     Birth control/protection: None   Other Topics Concern    Parent/sibling w/ CABG, MI or angioplasty before 65F 55M? No   Social History Narrative    Not on file     Social Determinants of Health     Financial Resource Strain: Not on file   Food Insecurity: Not on file   Transportation Needs: Not on file   Physical Activity: Not on file   Stress: Not on file   Social Connections: Not on file   Interpersonal Safety: Not on file   Housing Stability: Not on file        Fam Hx:    Family History   Problem Relation Age of Onset    Cancer Paternal Grandmother         Lymphoma    Cancer Paternal Grandfather         Pancreatic    Diabetes Father         Type II Diabetes    Cancer Father         Melanoma    Cancer Maternal Grandfather         Brain Tumor, Bladder Cancer    Cancer -  colorectal Maternal Grandfather     Asthma Sister     Blood Disease Sister         Blood Clots in the lung         PHYSICAL EXAM:      Vitals:  /71   Temp 99.1  F (37.3  C) (Temporal)   Resp 16   Alert Awake in NAD  ABD gravid, non-tender, EFW 7#  Cervix:  0 cm / 60 % effaced at -3 station, membranes are intact  EFM:  Baseline 125, with moderate variability, accels are present, no decels  [place]: contractions q3-5 min    Assessment:  IUP at 38w0d admitted for induction of labor for a BPP of 4/8 (0 points for fluid volume and fetal breathing movements.  Unfavorable cervix. Satisfactory fetal status.  GBS negative.    Plan:  Admission            Continuous fetal and uterine monitoring            Cervidil for ripening due to oligohydramnios            Analgesia            The plan of care was discussed with the patient and her partner.  They expressed understanding and agreement.             Anticipate        Mario Parra MD MD  Dept of OB/GYN  2024

## 2024-02-06 NOTE — ANESTHESIA PROCEDURE NOTES
Epidural catheter Procedure Note    Pre-Procedure   Staff -        Anesthesiologist:  Shai Singh MD       Performed By: anesthesiologist       Location: OB       Pre-Anesthestic Checklist: patient identified, IV checked, risks and benefits discussed, informed consent, monitors and equipment checked, pre-op evaluation and at physician/surgeon's request  Timeout:       Correct Patient: Yes        Correct Procedure: Yes        Correct Site: Yes        Correct Position: Yes   Procedure Documentation  Procedure: epidural catheter       Patient Position: sitting       Patient Prep/Sterile Barriers: sterile gloves, mask, patient draped       Skin prep: Betadine       Local skin infiltrated with mL of 1% lidocaine.        Insertion Site: L4-5. (midline approach).       Technique: LORT saline        MAXIM at 7 cm.       Needle Type: [employer] needle       Needle Gauge: 17.        Needle Length (Inches): 3.5           Catheter threaded easily.           Threaded 12 cm at skin.         # of attempts: 1 and  # of redirects:     Assessment/Narrative         Paresthesias: No.       Test dose of 3 mL lidocaine 1.5% w/ 1:200,000 epinephrine at.         Test dose negative, 3 minutes after injection, for signs of intravascular, subdural, or intrathecal injection.       Insertion/Infusion Method: LORT saline       Aspiration negative for Heme or CSF via Epidural Catheter.    Medication(s) Administered   0.2% Ropivacaine (Epidural) - EPIDURAL   10 mL - 2/6/2024 4:07:00 AM   Comments:  No complications.  Catheter secured with sterile dressing and tape.  Questions answered.    Orders to manage the epidural infusion have been entered and, through coordination with the nurse, we will continue to manage and monitor the patient's labor epidural.  We will continuously be available to adjust as needed throughout the entire labor and delivery process.        FOR [facility] ([facility]) ONLY:   Pain Team Contact information: please page the Pain  "Team Via [facility]. Search \"Pain\". During daytime hours, please page the attending first. At night please page the resident first.      "

## 2024-02-06 NOTE — PROVIDER NOTIFICATION
02/06/24 0643   Provider Notification   Provider Name/Title Fidel   Method of Notification Electronic Page   Request Evaluate - Remote   Notification Reason Status Update;SVE     Update: 1st epidural failed, 2nd one worked, finally paula, SVE  2.5/70-80/-2, much softer, still mid position. Will have day shift start pit, but first nap! FHT cat 1 with some decels after epidural placement.

## 2024-02-06 NOTE — ANESTHESIA PREPROCEDURE EVALUATION
"Anesthesia Pre-Procedure Evaluation    Patient: Kathi Enriquez   MRN: [id number] : 1995        Procedure :           Past Medical History:   Diagnosis Date    Anxiety     Deliberate self-cutting     Depressive disorder     Insomnia     Major depressive disorder, recurrent episode, moderate (H) 2016    Panic       Past Surgical History:   Procedure Laterality Date    COLONOSCOPY      NO HISTORY OF SURGERY        Allergies   Allergen Reactions    Nickel Itching, Rash and Swelling      Social History     Tobacco Use    Smoking status: Former     Packs/day: .25     Types: Cigarettes     Quit date: 2023     Years since quittin.7    Smokeless tobacco: Never    Tobacco comments:     smokes 2 or 3 a day    Substance Use Topics    Alcohol use: Not Currently      Wt Readings from Last 1 Encounters:   24 92.5 kg (204 lb)           Physical Exam    Airway        Mallampati: II       Respiratory Devices and Support         Dental           Cardiovascular   cardiovascular exam normal          Pulmonary   pulmonary exam normal                OUTSIDE LABS:  CBC:   Lab Results   Component Value Date    WBC 9.7 2024    WBC 14.7 (H) 2023    HGB 12.3 2024    HGB 13.3 2023    HCT 36.8 2024    HCT 39.9 2023     2024     2023     BMP:   Lab Results   Component Value Date     2022     2018    POTASSIUM 3.5 2022    POTASSIUM 3.5 2018    CHLORIDE 105 2022    CHLORIDE 109 2018    CO2 26 2022    CO2 25 2018    BUN 5 (L) 2022    BUN 5 (L) 2018    CR 0.68 2022    CR 0.67 2018     (H) 2022    GLC 86 2018     COAGS: No results found for: \"PTT\", \"INR\", \"FIBR\"  POC:   Lab Results   Component Value Date    HCG Negative 2018    HCGS Negative 2016     HEPATIC:   Lab Results   Component Value Date    ALBUMIN 3.8 2023    PROTTOTAL 6.9 " 11/29/2023    ALT 13 11/29/2023    AST 16 11/29/2023    ALKPHOS 76 11/29/2023    BILITOTAL 0.2 11/29/2023     OTHER:   Lab Results   Component Value Date    FANY 8.4 (L) 03/21/2022    TSH 1.76 06/22/2016       Anesthesia Plan    ASA Status:  2       Anesthesia Type: Epidural.              Consents    Anesthesia Plan(s) and associated risks, benefits, and realistic alternatives discussed. Questions answered and patient/representative(s) expressed understanding.     - Discussed:     - Discussed with:  Patient            Postoperative Care            Comments:               ANA ARRIAGA MD    I have reviewed the pertinent notes and labs in the chart from the past 30 days and (re)examined the patient.  Any updates or changes from those notes are reflected in this note.

## 2024-02-07 LAB — HGB BLD-MCNC: 10.4 G/DL (ref 11.7–15.7)

## 2024-02-07 PROCEDURE — 36415 COLL VENOUS BLD VENIPUNCTURE: CPT | Performed by: OBSTETRICS & GYNECOLOGY

## 2024-02-07 PROCEDURE — 85018 HEMOGLOBIN: CPT | Performed by: OBSTETRICS & GYNECOLOGY

## 2024-02-07 PROCEDURE — 250N000013 HC RX MED GY IP 250 OP 250 PS 637: Performed by: OBSTETRICS & GYNECOLOGY

## 2024-02-07 PROCEDURE — 120N000012 HC R&B POSTPARTUM

## 2024-02-07 RX ORDER — PANTOPRAZOLE SODIUM 20 MG/1
20 TABLET, DELAYED RELEASE ORAL
Status: DISCONTINUED | OUTPATIENT
Start: 2024-02-07 | End: 2024-02-08 | Stop reason: HOSPADM

## 2024-02-07 RX ADMIN — ACETAMINOPHEN 650 MG: 325 TABLET, FILM COATED ORAL at 20:40

## 2024-02-07 RX ADMIN — IBUPROFEN 800 MG: 400 TABLET ORAL at 14:24

## 2024-02-07 RX ADMIN — ACETAMINOPHEN 650 MG: 325 TABLET, FILM COATED ORAL at 14:24

## 2024-02-07 RX ADMIN — IBUPROFEN 800 MG: 400 TABLET ORAL at 02:02

## 2024-02-07 RX ADMIN — DOCUSATE SODIUM 100 MG: 100 CAPSULE, LIQUID FILLED ORAL at 07:45

## 2024-02-07 RX ADMIN — SERTRALINE HYDROCHLORIDE 25 MG: 25 TABLET ORAL at 07:44

## 2024-02-07 RX ADMIN — OXYCODONE HYDROCHLORIDE 5 MG: 5 TABLET ORAL at 20:40

## 2024-02-07 RX ADMIN — OXYCODONE HYDROCHLORIDE 5 MG: 5 TABLET ORAL at 06:38

## 2024-02-07 RX ADMIN — ACETAMINOPHEN 650 MG: 325 TABLET, FILM COATED ORAL at 07:45

## 2024-02-07 RX ADMIN — IBUPROFEN 800 MG: 400 TABLET ORAL at 07:45

## 2024-02-07 RX ADMIN — ACETAMINOPHEN 650 MG: 325 TABLET, FILM COATED ORAL at 02:02

## 2024-02-07 RX ADMIN — IBUPROFEN 800 MG: 400 TABLET ORAL at 20:40

## 2024-02-07 RX ADMIN — OXYCODONE HYDROCHLORIDE 5 MG: 5 TABLET ORAL at 14:32

## 2024-02-07 RX ADMIN — PANTOPRAZOLE SODIUM 20 MG: 20 TABLET, DELAYED RELEASE ORAL at 08:30

## 2024-02-07 ASSESSMENT — ACTIVITIES OF DAILY LIVING (ADL)
ADLS_ACUITY_SCORE: 21
ADLS_ACUITY_SCORE: 23
ADLS_ACUITY_SCORE: 23
ADLS_ACUITY_SCORE: 22
ADLS_ACUITY_SCORE: 22
ADLS_ACUITY_SCORE: 21
ADLS_ACUITY_SCORE: 23
ADLS_ACUITY_SCORE: 23
ADLS_ACUITY_SCORE: 26
ADLS_ACUITY_SCORE: 23
ADLS_ACUITY_SCORE: 21
ADLS_ACUITY_SCORE: 21

## 2024-02-07 NOTE — PROGRESS NOTES
Patient, spouse, and  transferred to room 406 accompanied by Mayra LAWLER around 2150. Bedside report received at this time. ID bands double verified. Patient and spouse oriented to room, call light, and plan of care. Safety protocols including safe sleep and bulb syringe use reviewed. Feeding log in new family book reviewed. Encouraged to call with questions/concerns.

## 2024-02-07 NOTE — PLAN OF CARE
VSS, fundus firm, light flow. Danielle area swollen. Using IP, Tucks, Benzocaine spray. Taking Tylenol and Ibuprofen for pain. Not needing Oxycodone for pain today. Breastfeeding her baby boy, pumping after for drops of colostrum and suppl. baby with DM by feeding tube device. Hgb. 10.4. Pt. called writer to room this afternoon c/o uterine cramping a 10/10, has voided recently and has been doing so without difficulty, bldg. minimal. Tylenol, Ibuprofen and Oxycodone given along with hot packs. Tearful with cramping. Feeling better after meds., pain decreasing and is now a 7/10. Family at bedside and supportive.   Goal Outcome Evaluation:      Plan of Care Reviewed With: patient    Overall Patient Progress: improvingOverall Patient Progress: improving

## 2024-02-07 NOTE — PLAN OF CARE
Vacuum assisted Vaginal delivery of baby boy at 1850 with delivery team present. Vacuum applied and pulled x2. Total pull of 120 seconds. Baby placed directly to abdomen for skin to skin. Dried and stimulated. See flow sheet and delivery summary.

## 2024-02-07 NOTE — LACTATION NOTE
"Lactation visit with Kathi, her mom and aunt, and baby boy.    Infant had cool temps and was found to have a low blood glucose, so hypoglycemic algorithm followed. Supplementation started, finger feeding infant donor breast milk. Today at visit, infant due to nurse. He woke easily with stimulation and was eager to nurse with Kathi, helped position infant in football hold on L breast. He latched well and displayed nutritive suckling pattern. Helped to demonstrate how to \"sandwich\" breast tissue to make it easier for infant to get a deep latch.  Discussed as infant becomes more wakeful and continues to breastfeed well, supplementation could be weaned.     Discussed  breastfeeding basics:   1. Watch for early feeding cues (licking lips, stirring or rooting, sucking movement with mouth, hands to mouth).  2. Infant should breastfeed on demand and a minimum of 8 times in 24 hours. Encourage/offer to breastfeed infant at least 3 hours (from the start of the last feeding). Encouraged to utilize RN support with breastfeeding.      Educated on techniques to wake a sleepy baby for feedings: un-swaddle infant, check infant's diaper, begin snuggling skin to skin and begin gentle stimulation including stroking infant's back and feet. Practiced hand expression with Kathi.     Reviewed breast feeding section in our \"Guide to Postpartum and [place] Care.\" Highlighting pages that educates to  feeding patterns/behavior: Emphasizing day 1 infant may be more sleepy (the birthday nap); followed by cluster-feeding (breastfeeding marathon) on second day/night. We reviewed the feeding log in back of booklet, how/why tracking infant's feedings and wet/dirty diapers is important. Provided Braxton suggestions for tracking beyond day 5.     Educated on nutritive vs non-nutritive suckling patterns. Reviewed breastfeeding positions and techniques to obtain/maintain deep latch, including nose to nipple alignment and how to " "support infant's shoulder blades and neck to allow flexion for optimal latch positioning. Discussed breastfeeding with a correct latch should feel like a strong \"tug or pull\". If mother experiences a \"pinching or biting\" sensation, an adjustment to infant's latch is needed. Demonstrated how support person can gently pull down on infant's chin to increase depth of latch. And if this adjustment does not improve comfort, then un-latch infant properly (techniques given), assess nipple shape and make any necessary adjustments with positioning before re-latching.     Discussed physiology of milk production from colostrum through milk \"coming in\" between day 3-5 (typically); emphasizing adequate stimulation to the breast is what causes this change to occur. Appreciative of visit.    Addendum: Parental request for lactation to come back and assist with 1745 feeding. Infant had been showing hunger cues but then was sleepy at breast. Reviewed techniques to keep infant wakeful and stimulated. Dad at bedside and eager to help stroke infant, hold his hand. Infant may be beginning to cluster feed, as soon as he seemed asleep and Kathi took him away from the breast he began immediately rooting. Reviewed clusterfeeding.    Dorys Mclaughlin RN, IBCLC          "

## 2024-02-07 NOTE — PROGRESS NOTES
Transfer Note:  Pt completed post delivery recovery period without complications. Fundus was firm, light to scant flow. Pt was able to ambulate to bathroom with standby assist x2. Unable to void at this time. Pt encouraged to stay on toilet and continue to try and pt declined at this time. Pt placed in wheelchair, infant was placed in bassinet and transferred to room 406. Pt's belongings were packed by SO and transported by him and another RN.   After arriving in room 406, pt bladder scanned x2 (residual amts 487 and >700). Pt wanted to attempt voiding prior to wiley catheter. Pt was able to void on toilet. Amount measured was 200ml but pt states she voided more than that but missed the hat.   Report given to Laine BLUM RN.

## 2024-02-07 NOTE — PROVIDER NOTIFICATION
02/06/24 1820   Provider Notification   Provider Name/Title Dr. Brower   Method of Notification At Bedside   Request Evaluate in Person;Attend Delivery     Dr. Brower at bedside to assess pt and progress with pushing. SVE. Discussed use of vacuum. Pt questions answered. Support given. Pt consents to vacuum. Fetal tracing remains difficult to maintain continuous monitoring. Call to delivery team to attend following straight cath.

## 2024-02-07 NOTE — PROVIDER NOTIFICATION
02/06/24 2229   Provider Notification   Provider Name/Title Dr. Brower   Method of Notification Phone   Request Evaluate-Remote   Notification Reason Medication Request  (pain meds)     MD updated about patient's increased pain levels.  MD ordered 5mg of oxycodone every 6 hours PRN.  Will updated provider if pain continues at increased levels.  Plan of care ongoing.

## 2024-02-07 NOTE — PLAN OF CARE
Goal Outcome Evaluation:      Plan of Care Reviewed With: patient    Vital signs stable. Postpartum assessment WDL. Pain controlled with tylenol, ibuprofen, benzocaine spray and oxycodone. Patient up ad roberto and voiding without difficulty. Breastfeeding on cue with full staff assist. Patient and infant bonding well. Plan of care ongoing.

## 2024-02-07 NOTE — L&D DELIVERY NOTE
OB Vacuum assisted Vaginal Delivery Note  [facility]      HPI:  Pt is a 28 year old  @ 38w1d who presented to L&D on 2024 for IOL for BPP 4/8 (fluid and movement), CHANTEL 4.6cm.       Prenatal labs:  A POS antibody screen: negative, Rubella Immune,  Hep B/HIV/RPR all negative, GC/CT negative, GCT abnormal 1 hr, one abnormal on 3 hr gtt, GBS negative     Pregnancy complications:    1.anxiety and depression - zoloft   2.ADHD no meds  3.abnormal 1 hr, 1 abnormal on 3 hr  4.h/o bulemia  5.history of ectopic pregnancy  6.EIF- normal MT21   7.BPP 4/8 on day of induction.     Hospital Course:    First Stage: On admission, contractions were absent and patient was closed. FHTs were in the 120s with accelerations present. moderate variability,  no decelerations noted.   Abdomen was non-tender.  EFW was 7 pounds by Leopold's.  Patient's labor was induced with cervadil.    At the patient's request, she received epidural analgesia.  She did receive pitocin for augmentation of labor, to a maximum of 2mu/min.  Artificial ROM occurred at 1325 with clear fluid noted.  Patient reached complete cervical dilation at 1325 on 2024.    Second Stage:  Patient did  labor down, and began pushing around 1400.  Good maternal expulsive efforts were noted.  Fetal heart tones remained reassuring during the second stage.  Baseline 150, with moderate variability, variable decelerations noted.      Due to prolonged second stage-over 4 hours of pushing, maternal exhaustion, recommendation to proceed with a vacuum delivery was explained to patient.     Vacuum Assisted Vaginal Delivery  performed using Kiwi vacuum. EFW 7.5#. Fetal presentation:  Cephalic.  Fetal position:  OA.  Fetal station: +3.  Verbal informed consent obtained.  Alternatives to vacuum discussed.  Risks of vacuum delivery discussed including risk of cephalohematoma, subgaleal hemorrhage, epidural hemorrhage, intercranial hemorrhage and maternal  perineal laceration. Patient gave verbal consent to proceed.  NICU and OR alerted that vacuum extraction planned.  Patient's bladder drained 600ml.  Vacuum applied over saggital suture, 3cm anterior to posterior fontanelle.  Vacuum applied  x 1.  Pulls:  2.  Pop-offs 0.  Total time vacuum applied 2 min.  Maximum pressure used 550 mmHg. Good descent of fetal vertex with each contraction.  Vacuum removed after fetal vertex brought to a full crown.  Remainder of infant delivered without complication.     A  nuchal cord was not present.  A male infant was then delivered without complications at 1850 on 2/6/2024.  The mouth and nares were bulb suctioned.  The cord was clamped after delayed cord clamping (until cord stopped pulsing per request), cut and the infant was placed on maternal abdomen.  The infant's weight was 8 pounds 9 ounces.  Apgars were 8 and 9 at one and five minutes .       Third Stage:  The placenta then delivered at  198.  It was noted to be intact with a three vessel cord.  The patient's perineum was inspected and a second degree laceration and bilateral labial/periurethral lacerations noted.  The area was infiltrated with 1% lidocaine and repaired in the usual fashion using 3-0 and 4-0 vicryl suture.  EBL for the procedure was 300 ml.  QBL 295ml.  Sponge and needle counts were correct.  The patient and infant remained in the delivery suite following delivery in stable condition.      MD Benoit Poon OB/GYN  2/7/2024 7:30 AM

## 2024-02-07 NOTE — PROVIDER NOTIFICATION
02/06/24 1750   Provider Notification   Provider Name/Title Dr. Brower   Method of Notification Electronic Page   Request Evaluate in Person     1800-Provider returns page. Updated on pushing x 4 hours and maternal exhaustion. Fetal baseline increased to 150 bpm with maternal temp of 99.5. provider to come assess. POC reviewed with pt and partner. Support given. Pt complains of increased pain with contractions. Pt continues to use PCEA with encouragement. Instructed patient to rest with contractions if that feels better while await provider.

## 2024-02-07 NOTE — PROGRESS NOTES
Post-partum Note      S: Patient is doing well today.  Pain is controlled with PO medications.  Tolerating regular diet without nausea or vomiting.  Ambulating without dizziness.  Urinating without difficulty. Lochia normal.  Breastfeeding, going well. Perineal pain last night, improved with oxy, swelling has gone down.     O:  Patient Vitals for the past 24 hrs:   BP Temp Temp src Pulse Resp SpO2   02/07/24 0500 117/65 -- -- 52 16 --   02/07/24 0100 116/64 97.4  F (36.3  C) Oral 53 -- --   02/06/24 2150 117/64 98.1  F (36.7  C) Oral 57 16 --   02/06/24 2115 129/65 -- -- -- -- --   02/06/24 2100 127/69 -- -- -- -- --   02/06/24 2045 123/72 -- -- -- -- --   02/06/24 2030 124/73 -- -- -- -- --   02/06/24 2015 112/60 -- -- -- -- --   02/06/24 2008 -- 99  F (37.2  C) -- -- -- --   02/06/24 2000 112/63 -- -- -- -- --   02/06/24 1945 115/63 -- -- -- -- --   02/06/24 1930 126/76 99.3  F (37.4  C) -- -- -- --   02/06/24 1915 121/68 -- -- -- -- --   02/06/24 1900 120/65 -- -- -- -- --   02/06/24 1850 -- -- -- -- -- 98 %   02/06/24 1830 -- -- -- -- -- 96 %   02/06/24 1815 115/56 -- -- -- -- 100 %   02/06/24 1800 116/60 99.5  F (37.5  C) Temporal -- 18 98 %   02/06/24 1745 118/85 -- -- -- -- 100 %   02/06/24 1730 -- -- -- -- -- 90 %   02/06/24 1715 -- -- -- -- -- (!) 84 %   02/06/24 1700 114/54 99  F (37.2  C) Temporal -- -- 97 %   02/06/24 1645 -- -- -- -- -- 98 %   02/06/24 1630 121/62 -- -- -- 18 97 %   02/06/24 1615 94/53 -- -- -- -- 95 %   02/06/24 1600 -- 98.9  F (37.2  C) Temporal -- 18 96 %   02/06/24 1545 119/75 -- -- -- -- 95 %   02/06/24 1530 128/73 -- -- -- 18 97 %   02/06/24 1513 128/61 -- -- -- -- --   02/06/24 1510 -- 98.2  F (36.8  C) Temporal -- 16 93 %   02/06/24 1459 (!) 182/63 -- -- -- -- --   02/06/24 1445 129/62 -- -- -- -- 94 %   02/06/24 1433 (!) 140/65 -- -- -- -- --   02/06/24 1400 91/54 97.5  F (36.4  C) Temporal -- 16 94 %   02/06/24 1345 115/79 -- -- -- -- 92 %   02/06/24 1325 117/73 -- -- -- 16 91  %   24 1300 97/60 -- -- -- -- 92 %   24 1245 104/57 -- -- -- -- 95 %   24 1230 93/55 -- -- -- -- 94 %   24 1215 (!) 84/48 -- -- -- -- 93 %   24 1200 111/58 -- -- -- -- 94 %   24 1145 98/53 -- -- -- -- 92 %   24 1135 -- 97.9  F (36.6  C) Temporal -- -- --   24 1130 108/51 -- -- -- -- 94 %   24 1115 106/59 -- -- -- -- 95 %   24 1100 108/57 -- -- -- -- 95 %   24 1045 103/57 -- -- -- -- 95 %   24 1030 115/71 -- -- -- -- 100 %   24 1015 106/63 -- -- -- -- 99 %   24 0945 -- -- -- -- -- 97 %   24 0930 100/62 -- -- -- -- 96 %   24 0915 110/59 -- -- -- -- 97 %   24 0900 (!) 85/54 -- -- -- -- 98 %   24 0845 97/56 97.5  F (36.4  C) Temporal -- 16 97 %   24 0830 91/50 -- -- -- -- 94 %   24 0800 95/55 -- -- -- -- 96 %   24 0745 98/54 -- -- -- -- 96 %   24 0730 92/50 -- -- -- -- 96 %       Gen:  Resting comfortably, NAD  Pulm:  Breathing comfortably on room air  Abd:  Soft, appropriately ttp, non-distended.Fundus at  umbilicus, firm and non-tender.  Perineum- soft, no hematoma formation, swelling much improved since delivery. Appropriately tender   Ext:  non-tender, 1+ bilateral LE edema    I/O last 3 completed shifts:  In: -   Out: 3995 [Urine:3700; Blood:295]    Hgb:     Hemoglobin   Date Value Ref Range Status   2024 12.3 11.7 - 15.7 g/dL Final   2023 13.3 11.7 - 15.7 g/dL Final   2018 11.4 (L) 11.7 - 15.7 g/dL Final   2017 12.6 11.7 - 15.7 g/dL Final       Assessment/Plan:  28 year old  on PPD #1 s/p VAVD for prolonged second stage and maternal exhaustion. IOL for BPP4/8 at 38w1d. Second degree perineal laceration.  Pregnancy complications:   1.anxiety and depression - zoloft   2.ADHD no meds  3.abnormal 1 hr, 1 abnormal on 3 hr  4.h/o bulemia  5.history of ectopic pregnancy  6.EIF- normal MT21   7.BPP 4/8 on day of induction.       1. Continue with routine  postpartum management  2. Pain -had some increased perineal pain last night, given oxycodone.   3. EBL: 300ml ; pre hemoglobin 12.3, post hemogobin pending, no symptoms of anemia.   4. A+ , Rubella immune  5. Feed: Breastfeeding  6. CV/RESP: vss  7. DVT PPX: ambulation    Dispo: Anticipate DC home PPD2.     MD Benoit Poon [facility]  2/7/2024, 7:29 AM

## 2024-02-08 VITALS
OXYGEN SATURATION: 98 % | HEART RATE: 65 BPM | SYSTOLIC BLOOD PRESSURE: 119 MMHG | BODY MASS INDEX: 30.94 KG/M2 | TEMPERATURE: 97.6 F | WEIGHT: 203.48 LBS | DIASTOLIC BLOOD PRESSURE: 80 MMHG | RESPIRATION RATE: 16 BRPM

## 2024-02-08 PROCEDURE — 250N000013 HC RX MED GY IP 250 OP 250 PS 637: Performed by: OBSTETRICS & GYNECOLOGY

## 2024-02-08 RX ORDER — OXYCODONE HYDROCHLORIDE 5 MG/1
5 TABLET ORAL EVERY 4 HOURS PRN
Qty: 8 TABLET | Refills: 0 | Status: SHIPPED | OUTPATIENT
Start: 2024-02-08

## 2024-02-08 RX ORDER — ACETAMINOPHEN 500 MG
1000 TABLET ORAL EVERY 6 HOURS PRN
COMMUNITY
Start: 2024-02-08

## 2024-02-08 RX ORDER — IBUPROFEN 200 MG
600 TABLET ORAL EVERY 6 HOURS PRN
COMMUNITY
Start: 2024-02-08

## 2024-02-08 RX ORDER — OXYCODONE HYDROCHLORIDE 5 MG/1
5 TABLET ORAL EVERY 4 HOURS PRN
Status: DISCONTINUED | OUTPATIENT
Start: 2024-02-08 | End: 2024-02-08 | Stop reason: HOSPADM

## 2024-02-08 RX ADMIN — ACETAMINOPHEN 650 MG: 325 TABLET, FILM COATED ORAL at 05:25

## 2024-02-08 RX ADMIN — OXYCODONE HYDROCHLORIDE 5 MG: 5 TABLET ORAL at 05:27

## 2024-02-08 RX ADMIN — BENZOCAINE: 11.4 AEROSOL, SPRAY TOPICAL at 05:25

## 2024-02-08 RX ADMIN — OXYCODONE HYDROCHLORIDE 5 MG: 5 TABLET ORAL at 11:01

## 2024-02-08 RX ADMIN — DOCUSATE SODIUM 100 MG: 100 CAPSULE, LIQUID FILLED ORAL at 09:15

## 2024-02-08 RX ADMIN — SERTRALINE HYDROCHLORIDE 25 MG: 25 TABLET ORAL at 09:16

## 2024-02-08 RX ADMIN — ACETAMINOPHEN 650 MG: 325 TABLET, FILM COATED ORAL at 09:15

## 2024-02-08 RX ADMIN — PANTOPRAZOLE SODIUM 20 MG: 20 TABLET, DELAYED RELEASE ORAL at 09:31

## 2024-02-08 RX ADMIN — BENZOCAINE: 11.4 AEROSOL, SPRAY TOPICAL at 00:45

## 2024-02-08 RX ADMIN — IBUPROFEN 800 MG: 400 TABLET ORAL at 03:08

## 2024-02-08 RX ADMIN — ACETAMINOPHEN 650 MG: 325 TABLET, FILM COATED ORAL at 00:46

## 2024-02-08 RX ADMIN — IBUPROFEN 800 MG: 400 TABLET ORAL at 09:16

## 2024-02-08 RX ADMIN — OXYCODONE HYDROCHLORIDE 5 MG: 5 TABLET ORAL at 01:12

## 2024-02-08 ASSESSMENT — ACTIVITIES OF DAILY LIVING (ADL)
ADLS_ACUITY_SCORE: 21

## 2024-02-08 NOTE — PLAN OF CARE
D: VSS, assessments WDL.   I: Pt. received complete discharge paperwork and home medications as filled by discharge pharmacy here.  Pt. was given times of last dose for all discharge medications in writing on discharge medication sheets.  Discharge teaching included home medication, pain management, activity restrictions, postpartum cares, and signs and symptoms of infection.    A: Discharge outcomes on care plan met.  Mother states understanding and comfort with self and baby cares.  P: Pt. discharged to home.  Pt. was discharged with baby, and bands were checked at time of discharge.  Pt. was accompanied by significant other, nurse and baby, and left with personal belongings.  Pt. to follow up with OB per MD order.  Pt. had no further questions at the time of discharge and no unmet needs were identified.

## 2024-02-08 NOTE — PLAN OF CARE
Vital signs stable. Postpartum assessment within normal limits. Pain controlled with tylenol, ibuprofen, and oxycodone. Patient voiding without difficulty. Breastfeeding on cue with minimal assistance. Patient and infant bonding well. Will continue with current plan of care.

## 2024-02-08 NOTE — PROGRESS NOTES
"OB Post-partum Note PPD# 2    S:  Patient doing well.  Pain poorly controlled unless using oxycodone for uterine cramping. States severe constant uterine cramping when oxycodone \"wears off\". Is also taking scheduled ibuprofen and Tylenol. No BM. Voiding.  Bleeding normal.  Breast feeding. Mental health is ok. Will follow up with psychiatrist and therapist.    O:  /80 (BP Location: Left arm)   Pulse 65   Temp 97.6  F (36.4  C) (Oral)   Resp 16   Wt 92.3 kg (203 lb 7.8 oz)   SpO2 98%   Breastfeeding Unknown   BMI 30.94 kg/m    Gen- A&O, NAD  Abd- Non-tender, fundus firm at umbilicus. Mildlly tender.  Ext- non-tender, trace edema    Hemoglobin   Date Value Ref Range Status   2024 10.4 (L) 11.7 - 15.7 g/dL Final   2018 11.4 (L) 11.7 - 15.7 g/dL Final     A POS  Rubella Immune    A/P: 28 year old  PPD# 2 s/p     1.  Routine post-partum cares.  - Discussed routine pain management for  does not usually include scheduled narcotics. No other evidence of complications. Strongly encouraged tapering off this medication.  2.  Anticipate d/c home on PPD# 2.  3.  Psych - Mental health is ok. Will follow up with psychiatrist and therapist.    Elaine Bonilla MD  2024  9:25 AM  "

## 2024-02-08 NOTE — DISCHARGE INSTRUCTIONS
Warning Signs after Having a Baby    Keep this paper on your fridge or somewhere else where you can see it.    Call your provider if you have any of these symptoms up to 12 weeks after having your baby.    Thoughts of hurting yourself or your baby  Pain in your chest or trouble breathing  Severe headache not helped by pain medicine  Eyesight concerns (blurry vision, seeing spots or flashes of light, other changes to eyesight)  Fainting, shaking or other signs of a seizure    Call 9-1-1 if you feel that it is an emergency.     The symptoms below can happen to anyone after giving birth. They can be very serious. Call your provider if you have any of these warning signs.    Losing too much blood (hemorrhage)    Call your provider if you soak through a pad in less than an hour or pass blood clots bigger than a golf ball. These may be signs that you are bleeding too much.    Blood clots in the legs or lungs    After you give birth, your body naturally clots its blood to help prevent blood loss. Sometimes this increased clotting can happen in other areas of the body, like the legs or lungs. This can block your blood flow and be very dangerous.     Call your provider if you:  Have a red, swollen spot on the back of your leg that is warm or painful when you touch it.   Are coughing up blood.     Infection    Call your provider if you have any of these symptoms:  Fever of 100.4 F (38 C) or higher.  Pain or redness around your stitches if you had an incision.   Any yellow, white, or green fluid coming from places where you had stitches or surgery.    Mood Problems (postpartum depression)    Many people feel sad or have mood changes after having a baby. But for some people, these mood swings are worse.     Call your provider right away if you feel so anxious or nervous that you can't care for yourself or your baby.    Preeclampsia (high blood pressure)    Even if you didn't have high blood pressure when you were pregnant, you  "are at risk for the high blood pressure disease called preeclampsia. This risk can last up to 12 weeks after giving birth.     Call your provider if you have:   Pain on your right side under your rib cage  Sudden swelling in the hands and face    Remember: You know your body. If something doesn't feel right, get medical help.     For informational purposes only. Not to replace the advice of your health care provider. Copyright 2020 [facility] [employer] [facility]. All rights reserved. Clinically reviewed by Flower Travis, RNC-OB, MSN. [employer] [id number] - Rev .      Postpartum: Care Instructions  Overview  After childbirth (postpartum period), your body goes through many changes. Some of these changes happen over several weeks. In the hours after delivery, your body will begin to recover from childbirth while it prepares to breastfeed your . You may feel emotional during this time. Your hormones can shift your mood without warning for no clear reason.  In the first couple of weeks after childbirth, it's common to have emotions that change from happy to sad. You may find it hard to sleep. You may cry a lot. This is called the \"baby blues.\" These overwhelming emotions often go away within a couple of days or weeks. But it's important to discuss your feelings with your doctor.  It's easy to get too tired and overwhelmed during the first weeks after childbirth. Don't try to do too much. Get rest whenever you can, accept help from others, and eat well and drink plenty of fluids.  In the first couple of weeks after you give birth, your doctor or midwife may want to check in with you and make a plan for any follow-up care you may need. You will likely have a complete postpartum visit in the first 3 months after delivery. At that time, your doctor or midwife will check on your recovery from childbirth and see how you're doing with your emotions. You may also discuss your concerns or questions.  Follow-up care is a key " part of your treatment and safety. Be sure to make and go to all appointments, and call your doctor if you are having problems. It's also a good idea to know your test results and keep a list of the medicines you take.  How can you care for yourself at home?  Sleep or rest when your baby sleeps.  Get help with household chores from family or friends, if you can. Don't try to do it all yourself.  If you have hemorrhoids or swelling or pain around the opening of your vagina, try using cold and heat. You can put ice or a cold pack on the area for 10 to 20 minutes at a time. Put a thin cloth between the ice and your skin. Also try sitting in a few inches of warm water (sitz bath) 3 times a day and after bowel movements.  Take pain medicines exactly as directed.  If the doctor gave you a prescription medicine for pain, take it as prescribed.  If you are not taking a prescription pain medicine, ask your doctor if you can take an over-the-counter medicine.  Eat more fiber to avoid constipation. Include foods such as whole-grain breads and cereals, raw vegetables, raw and dried fruits, and beans.  Drink plenty of fluids. If you have kidney, heart, or liver disease and have to limit fluids, talk with your doctor before you increase the amount of fluids you drink.  Do not rinse inside your vagina with fluids (douche).  If you have stitches, keep the area clean by pouring or spraying warm water over the area outside your vagina and anus after you use the toilet.  Keep a list of questions to ask your doctor or midwife. Your questions might be about:  Changes in your breasts, such as lumps or soreness.  When to expect your menstrual period to start again.  What form of birth control is best for you.  Weight you have put on during the pregnancy.  Exercise options.  What foods and drinks are best for you, especially if you are breastfeeding.  Problems you might be having with breastfeeding.  When you can have sex. You may want to  talk about lubricants for your vagina.  Any feelings of sadness or restlessness that you are having.  When should you call for help?  Share this information with your partner, family, or a friend. They can help you watch for warning signs.  Call 911  anytime you think you may need emergency care. For example, call if:    You have thoughts of harming yourself, your baby, or another person.     You passed out (lost consciousness).     You have chest pain, are short of breath, or cough up blood.     You have a seizure.   Where to get help 24 hours a day, 7 days a week   If you or someone you know talks about suicide, self-harm, a mental health crisis, a substance use crisis, or any other kind of emotional distress, get help right away. You can:    Call the Suicide and Crisis Lifeline at 988.     Call 3-364-259-TALK (1-948.821.6136).     Text HOME to 375874 to access the Crisis Text Line.   Consider saving these numbers in your phone.  Go to [employer] for more information or to chat online.  Call your doctor now or seek immediate medical care if:    You have signs of hemorrhage (too much bleeding), such as:  Heavy vaginal bleeding. This means that you are soaking through one or more pads in an hour. Or you pass blood clots bigger than an egg.  Feeling dizzy or lightheaded, or you feel like you may faint.  Feeling so tired or weak that you cannot do your usual activities.  A fast or irregular heartbeat.  New or worse belly pain.     You have signs of infection, such as:  A fever.  Frequent or painful urination or blood in your urine.  Vaginal discharge that smells bad.  New or worse belly pain.     You have symptoms of a blood clot in your leg (called a deep vein thrombosis), such as:  Pain in the calf, back of the knee, thigh, or groin.  Swelling in the leg or groin.  A color change on the leg or groin. The skin may be reddish or purplish, depending on your usual skin color.     You have signs of preeclampsia,  "such as:  Sudden swelling of your face, hands, or feet.  New vision problems (such as dimness, blurring, or seeing spots).  A severe headache.     You have signs of heart failure, such as:  New or increased shortness of breath.  New or worse swelling in your legs, ankles, or feet.  Sudden weight gain, such as more than 2 to 3 pounds in a day or 5 pounds in a week.  Feeling so tired or weak that you cannot do your usual activities.     You had spinal or epidural pain relief and have:  New or worse back pain.  Increased pain, swelling, warmth, or redness at the injection site.  Tingling, weakness, or numbness in your legs or groin.   Watch closely for changes in your health, and be sure to contact your doctor if:    Your vaginal bleeding isn't decreasing.     You feel sad, anxious, or hopeless for more than a few days.     You are having problems with your breasts or breastfeeding.   Where can you learn more?  Go to [email or web address]/patiented  Enter Z768 in the search box to learn more about \"Postpartum: Care Instructions.\"  Current as of: July 11, 2023               Content Version: 13.8    [id number] [employer].   Care instructions adapted under license by your healthcare professional. If you have questions about a medical condition or this instruction, always ask your healthcare professional. [employer] disclaims any warranty or liability for your use of this information.        "

## 2024-02-08 NOTE — PROVIDER NOTIFICATION
Modify order to Oxycodone every 4 hours.   02/08/24 0059   Provider Notification   Provider Name/Title Dr. Kraft   Method of Notification Phone   Request Evaluate-Remote   Notification Reason Medication Request

## 2024-02-08 NOTE — LACTATION NOTE
Called to room per request of Mother to discuss and assess a feeding.  Mother also has questions about taking home donor breast milk.      Baby boy due for a feeding at time of visit.  LC reviewed with Mother proper positioning of baby, maternal hand placement, using breast feeding support pillows, and how to help baby achieve a deep latch with feedings.  Reviewed importance of getting a deep latch with feedings versus a shallow latch.  LC assisted mother to get baby latched onto right breast in the football position.  Good latch noted with strong, continuous suckle pattern.   Baby boy tolerates feeding well.        Mother and Father educated on normal  behavior, focusing on normal feeding patterns from birth to day 3 of life. Breast feeding general information reviewed.  LC gave Mother and Father [facility] information to call and request donor milk and explained the price of the bottles.    Appreciative of visit.  Reviewed follow up with outpatient lactation consultant in pediatrician clinic as needed.    Erum Rodríguez RN, IBCLC

## 2024-02-09 ENCOUNTER — PATIENT OUTREACH (OUTPATIENT)
Dept: CARE COORDINATION | Facility: CLINIC | Age: 29
End: 2024-02-09
Payer: COMMERCIAL

## 2024-02-09 NOTE — PROGRESS NOTES
"  [facility]: [facility]: Post-Discharge Note  SITUATION                                                      Admission:    Admission Date: 24   Reason for Admission: Maternity care  Discharge:   Discharge Date: 24  Discharge Diagnosis:     BACKGROUND                                                      Per hospital discharge summary and inpatient provider notes:     Ms. Enriquez  is a 28 year old  @ 38w0d by dates, confirmed by ultrasound who presented to L&D for cervical ripening prior to planned induction of labor for a BPP of 4/8 (0 points for fluid volume and fetal breathing movements), but a reactive NST (6/10)            Discharge Assessment  How are you doing now that you are home?: \" we are doing great \"  How are your symptoms? (Red Flag symptoms escalate to triage hotline per guidelines): Improved  Do you feel your condition is stable enough to be safe at home until your provider visit?: Yes  Does the patient have their discharge instructions? : Yes  Does the patient have questions regarding their discharge instructions? : No  Were you started on any new medications or were there changes to any of your previous medications? : Yes  Does the patient have all of their medications?: Yes  Do you have questions regarding any of your medications? : No  Do you have all of your needed medical supplies or equipment (DME)?  (i.e. oxygen tank, CPAP, cane, etc.): Yes  Discharge follow-up appointment scheduled within 14 calendar days? : No    Post-op ([facility] Only)  If the patient had a surgery or procedure, do they have any questions for a nurse?: No           PLAN                                                      Outpatient Plan: Follow up with provider in 6 weeks for post-delivery check    No future appointments.      For any urgent concerns, please contact our 24 hour nurse triage line: [phone] ([phone])         JESSICA Ley                "

## 2024-09-11 ENCOUNTER — LAB REQUISITION (OUTPATIENT)
Dept: LAB | Facility: CLINIC | Age: 29
End: 2024-09-11
Payer: COMMERCIAL

## 2024-09-11 DIAGNOSIS — Z13.29 ENCOUNTER FOR SCREENING FOR OTHER SUSPECTED ENDOCRINE DISORDER: ICD-10-CM

## 2024-09-11 DIAGNOSIS — Z13.220 ENCOUNTER FOR SCREENING FOR LIPOID DISORDERS: ICD-10-CM

## 2024-09-11 LAB
CHOLEST SERPL-MCNC: 211 MG/DL
FASTING STATUS PATIENT QL REPORTED: YES
HDLC SERPL-MCNC: 46 MG/DL
LDLC SERPL CALC-MCNC: 149 MG/DL
NONHDLC SERPL-MCNC: 165 MG/DL
TRIGL SERPL-MCNC: 81 MG/DL
TSH SERPL DL<=0.005 MIU/L-ACNC: 1.98 UIU/ML (ref 0.3–4.2)

## 2024-09-11 PROCEDURE — 80061 LIPID PANEL: CPT | Mod: ORL

## 2024-09-11 PROCEDURE — 84443 ASSAY THYROID STIM HORMONE: CPT | Mod: ORL

## 2024-11-13 ENCOUNTER — LAB REQUISITION (OUTPATIENT)
Dept: LAB | Facility: CLINIC | Age: 29
End: 2024-11-13
Payer: COMMERCIAL

## 2024-11-13 DIAGNOSIS — Z13.9 ENCOUNTER FOR SCREENING, UNSPECIFIED: ICD-10-CM

## 2024-11-13 PROCEDURE — 84702 CHORIONIC GONADOTROPIN TEST: CPT | Mod: ORL | Performed by: ADVANCED PRACTICE MIDWIFE

## 2024-11-15 ENCOUNTER — LAB REQUISITION (OUTPATIENT)
Dept: LAB | Facility: CLINIC | Age: 29
End: 2024-11-15
Payer: COMMERCIAL

## 2024-11-15 DIAGNOSIS — Z13.9 ENCOUNTER FOR SCREENING, UNSPECIFIED: ICD-10-CM

## 2024-11-15 LAB
HCG INTACT+B SERPL-ACNC: 1146 MIU/ML
HCG INTACT+B SERPL-ACNC: 504 MIU/ML

## 2024-11-15 PROCEDURE — 84702 CHORIONIC GONADOTROPIN TEST: CPT | Mod: ORL | Performed by: ADVANCED PRACTICE MIDWIFE

## 2024-12-11 ENCOUNTER — LAB REQUISITION (OUTPATIENT)
Dept: LAB | Facility: CLINIC | Age: 29
End: 2024-12-11
Payer: COMMERCIAL

## 2024-12-11 DIAGNOSIS — Z34.81 ENCOUNTER FOR SUPERVISION OF OTHER NORMAL PREGNANCY, FIRST TRIMESTER: ICD-10-CM

## 2024-12-11 LAB
BASOPHILS # BLD AUTO: 0 10E3/UL (ref 0–0.2)
BASOPHILS NFR BLD AUTO: 0 %
EOSINOPHIL # BLD AUTO: 0.1 10E3/UL (ref 0–0.7)
EOSINOPHIL NFR BLD AUTO: 1 %
ERYTHROCYTE [DISTWIDTH] IN BLOOD BY AUTOMATED COUNT: 14 % (ref 10–15)
EST. AVERAGE GLUCOSE BLD GHB EST-MCNC: 105 MG/DL
HBA1C MFR BLD: 5.3 %
HBV SURFACE AG SERPL QL IA: NONREACTIVE
HCT VFR BLD AUTO: 43.1 % (ref 35–47)
HCV AB SERPL QL IA: NONREACTIVE
HGB BLD-MCNC: 14.1 G/DL (ref 11.7–15.7)
HIV 1+2 AB+HIV1 P24 AG SERPL QL IA: NONREACTIVE
IMM GRANULOCYTES # BLD: 0 10E3/UL
IMM GRANULOCYTES NFR BLD: 0 %
LYMPHOCYTES # BLD AUTO: 1.9 10E3/UL (ref 0.8–5.3)
LYMPHOCYTES NFR BLD AUTO: 20 %
MCH RBC QN AUTO: 27.9 PG (ref 26.5–33)
MCHC RBC AUTO-ENTMCNC: 32.7 G/DL (ref 31.5–36.5)
MCV RBC AUTO: 85 FL (ref 78–100)
MONOCYTES # BLD AUTO: 0.4 10E3/UL (ref 0–1.3)
MONOCYTES NFR BLD AUTO: 4 %
NEUTROPHILS # BLD AUTO: 7 10E3/UL (ref 1.6–8.3)
NEUTROPHILS NFR BLD AUTO: 75 %
NRBC # BLD AUTO: 0 10E3/UL
NRBC BLD AUTO-RTO: 0 /100
PLATELET # BLD AUTO: 344 10E3/UL (ref 150–450)
RBC # BLD AUTO: 5.05 10E6/UL (ref 3.8–5.2)
RUBV IGG SERPL QL IA: 4.03 INDEX
RUBV IGG SERPL QL IA: POSITIVE
T PALLIDUM AB SER QL: NONREACTIVE
TSH SERPL DL<=0.005 MIU/L-ACNC: 0.78 UIU/ML (ref 0.3–4.2)
WBC # BLD AUTO: 9.4 10E3/UL (ref 4–11)

## 2024-12-11 PROCEDURE — 87340 HEPATITIS B SURFACE AG IA: CPT | Mod: ORL | Performed by: ADVANCED PRACTICE MIDWIFE

## 2024-12-11 PROCEDURE — 86803 HEPATITIS C AB TEST: CPT | Mod: ORL | Performed by: ADVANCED PRACTICE MIDWIFE

## 2024-12-11 PROCEDURE — 85025 COMPLETE CBC W/AUTO DIFF WBC: CPT | Mod: ORL | Performed by: ADVANCED PRACTICE MIDWIFE

## 2024-12-11 PROCEDURE — 86780 TREPONEMA PALLIDUM: CPT | Mod: ORL | Performed by: ADVANCED PRACTICE MIDWIFE

## 2024-12-11 PROCEDURE — 87389 HIV-1 AG W/HIV-1&-2 AB AG IA: CPT | Mod: ORL | Performed by: ADVANCED PRACTICE MIDWIFE

## 2024-12-11 PROCEDURE — 83036 HEMOGLOBIN GLYCOSYLATED A1C: CPT | Mod: ORL | Performed by: ADVANCED PRACTICE MIDWIFE

## 2024-12-11 PROCEDURE — 86900 BLOOD TYPING SEROLOGIC ABO: CPT | Mod: ORL | Performed by: ADVANCED PRACTICE MIDWIFE

## 2024-12-11 PROCEDURE — 87491 CHLMYD TRACH DNA AMP PROBE: CPT | Mod: ORL | Performed by: ADVANCED PRACTICE MIDWIFE

## 2024-12-11 PROCEDURE — 86762 RUBELLA ANTIBODY: CPT | Mod: ORL | Performed by: ADVANCED PRACTICE MIDWIFE

## 2024-12-11 PROCEDURE — 87086 URINE CULTURE/COLONY COUNT: CPT | Mod: ORL | Performed by: ADVANCED PRACTICE MIDWIFE

## 2024-12-11 PROCEDURE — 84443 ASSAY THYROID STIM HORMONE: CPT | Mod: ORL | Performed by: ADVANCED PRACTICE MIDWIFE

## 2024-12-12 LAB
ABO + RH BLD: NORMAL
BLD GP AB SCN SERPL QL: NEGATIVE
C TRACH DNA SPEC QL PROBE+SIG AMP: NEGATIVE
N GONORRHOEA DNA SPEC QL NAA+PROBE: NEGATIVE
SPECIMEN EXP DATE BLD: NORMAL

## 2024-12-13 LAB — BACTERIA UR CULT: NORMAL

## 2025-02-03 ENCOUNTER — LAB REQUISITION (OUTPATIENT)
Dept: LAB | Facility: CLINIC | Age: 30
End: 2025-02-03
Payer: COMMERCIAL

## 2025-02-03 DIAGNOSIS — Z13.79 ENCOUNTER FOR OTHER SCREENING FOR GENETIC AND CHROMOSOMAL ANOMALIES: ICD-10-CM

## 2025-02-03 PROCEDURE — 82105 ALPHA-FETOPROTEIN SERUM: CPT | Mod: ORL | Performed by: OBSTETRICS & GYNECOLOGY

## 2025-02-06 LAB
# FETUSES US: NORMAL
AFP MOM SERPL: 0.89
AFP SERPL-MCNC: 25 NG/ML
AGE - REPORTED: 29.8 YR
CURRENT SMOKER: NO
FAMILY MEMBER DISEASES HX: NO
GA METHOD: NORMAL
GA: NORMAL WK
IDDM PATIENT QL: NO
INTEGRATED SCN PATIENT-IMP: NORMAL
SPECIMEN DRAWN SERPL: NORMAL

## 2025-03-02 ENCOUNTER — HEALTH MAINTENANCE LETTER (OUTPATIENT)
Age: 30
End: 2025-03-02

## 2025-05-02 ENCOUNTER — LAB REQUISITION (OUTPATIENT)
Dept: LAB | Facility: CLINIC | Age: 30
End: 2025-05-02
Payer: COMMERCIAL

## 2025-05-02 DIAGNOSIS — Z36.89 ENCOUNTER FOR OTHER SPECIFIED ANTENATAL SCREENING: ICD-10-CM

## 2025-05-02 LAB
ERYTHROCYTE [DISTWIDTH] IN BLOOD BY AUTOMATED COUNT: 14.2 % (ref 10–15)
HCT VFR BLD AUTO: 38.3 % (ref 35–47)
HGB BLD-MCNC: 12.4 G/DL (ref 11.7–15.7)
MCH RBC QN AUTO: 28 PG (ref 26.5–33)
MCHC RBC AUTO-ENTMCNC: 32.4 G/DL (ref 31.5–36.5)
MCV RBC AUTO: 87 FL (ref 78–100)
PLATELET # BLD AUTO: 221 10E3/UL (ref 150–450)
RBC # BLD AUTO: 4.43 10E6/UL (ref 3.8–5.2)
WBC # BLD AUTO: 9 10E3/UL (ref 4–11)

## 2025-05-02 PROCEDURE — 85027 COMPLETE CBC AUTOMATED: CPT | Mod: ORL | Performed by: NURSE PRACTITIONER

## 2025-05-02 PROCEDURE — 86592 SYPHILIS TEST NON-TREP QUAL: CPT | Mod: ORL | Performed by: NURSE PRACTITIONER

## 2025-05-05 LAB — RPR SER QL: NONREACTIVE

## 2025-05-30 ENCOUNTER — LAB REQUISITION (OUTPATIENT)
Dept: LAB | Facility: CLINIC | Age: 30
End: 2025-05-30
Payer: COMMERCIAL

## 2025-05-30 DIAGNOSIS — R53.1 WEAKNESS: ICD-10-CM

## 2025-05-30 LAB
ERYTHROCYTE [DISTWIDTH] IN BLOOD BY AUTOMATED COUNT: 14.5 % (ref 10–15)
HCT VFR BLD AUTO: 40.4 % (ref 35–47)
HGB BLD-MCNC: 13.1 G/DL (ref 11.7–15.7)
MCH RBC QN AUTO: 28.2 PG (ref 26.5–33)
MCHC RBC AUTO-ENTMCNC: 32.4 G/DL (ref 31.5–36.5)
MCV RBC AUTO: 87 FL (ref 78–100)
PLATELET # BLD AUTO: 198 10E3/UL (ref 150–450)
RBC # BLD AUTO: 4.65 10E6/UL (ref 3.8–5.2)
WBC # BLD AUTO: 10.6 10E3/UL (ref 4–11)

## 2025-05-30 PROCEDURE — 85027 COMPLETE CBC AUTOMATED: CPT | Mod: ORL | Performed by: OBSTETRICS & GYNECOLOGY

## 2025-06-15 ENCOUNTER — HOSPITAL ENCOUNTER (OUTPATIENT)
Facility: CLINIC | Age: 30
End: 2025-06-15
Payer: COMMERCIAL

## 2025-06-15 ENCOUNTER — HOSPITAL ENCOUNTER (OUTPATIENT)
Facility: CLINIC | Age: 30
Discharge: HOME | End: 2025-06-15
Payer: COMMERCIAL

## 2025-06-15 VITALS — RESPIRATION RATE: 18 BRPM | DIASTOLIC BLOOD PRESSURE: 78 MMHG | TEMPERATURE: 97.7 F | SYSTOLIC BLOOD PRESSURE: 125 MMHG

## 2025-06-15 PROBLEM — Z36.89 ENCOUNTER FOR TRIAGE IN PREGNANT PATIENT: Status: ACTIVE | Noted: 2025-06-15

## 2025-06-15 PROCEDURE — 59025 FETAL NON-STRESS TEST: CPT | Mod: 6MC

## 2025-06-15 PROCEDURE — G0463 HOSPITAL OUTPT CLINIC VISIT: HCPCS | Mod: 25,6MC

## 2025-06-15 RX ORDER — BUPROPION HYDROCHLORIDE 150 MG/1
100 TABLET ORAL EVERY MORNING
COMMUNITY

## 2025-06-15 ASSESSMENT — ACTIVITIES OF DAILY LIVING (ADL): ADLS_ACUITY_SCORE: 29

## 2025-06-15 NOTE — PLAN OF CARE
"Kathi presents to MAC a,bulatory and unaccompanied for evaluation of contractions. Pt to MAC 1, oriented to room and call light, EFM/toco monitors applied with verbal consent. Fht's present.     Kathi [place] and [place]. She was found to be frequently nancy on the monitor (reports not feeling them) and her SVE was 2cm/20%.     She denies nausea or SOB today and instead started having \"frequent intermittent abdominal cramping/tightness\" today. She endorses +FM, denies VB, LOF, dysuria, headache, visual changes, epigastric pain. Trace swelling of the ankles that started today.     VSS, SVE performed with pts consent. 1.5cm/50%/-3 no LOF visualized, no redness or abnormal vaginal discharge.     1908-Melly Chau CNM updated by Vocera and phone regarding above data. Fht's 115 with moderate variability, accels present and no decels. EFM tracing reviewed by JOHN remotely. Discharge order received.        Discharge instructions reviewed with Kathi and questions answered. Pt verbalizes understanding and agreement. Pt d/c'd home ambulatory    "

## 2025-06-16 NOTE — DISCHARGE INSTRUCTIONS
Learning About When to Call Your Doctor During Pregnancy (After 20 Weeks)  Overview  It's common to have concerns about what might be a problem when you're pregnant. Most pregnancies don't have any serious problems. But it's still important to know when to call your doctor if you have certain symptoms or signs of labor.  These are general suggestions. Your doctor may give you some more information about when to call.  When to call your doctor (after 20 weeks)  Call 911 anytime you think you may need emergency care. For example, call if:  You have severe vaginal bleeding. You have soaked through one or more pads in an hour, and the bleeding is not slowing down.  You have sudden, severe pain in your belly that does not go away.  You have chest pain, are short of breath, or cough up blood.  You passed out (lost consciousness).  You have a seizure.  You see or feel the umbilical cord.  You think you are about to deliver your baby and can't make it safely to the hospital or birthing center.  Call your doctor now or seek immediate medical care if:  You have vaginal bleeding.  You have belly pain.  You have a fever.  You are dizzy or lightheaded, or you feel like you may faint.  You have signs of a blood clot in your leg (called a deep vein thrombosis), such as:  Pain in the calf, back of the knee, thigh, or groin.  Swelling in your leg or groin.  A color change on the leg or groin. The skin may be reddish or purplish.  You have symptoms of preeclampsia, such as:  Sudden swelling of your face, hands, or feet.  New vision problems (such as dimness, blurring, or seeing spots).  A severe headache that will not go away.  You have a sudden release or slow trickle of fluid from your vagina. This may mean your water has broken.  You've been having regular contractions for an hour at less than 37 weeks. This means that you've had at least 6 contractions within 1 hour, even after you change your position and drink fluids.  You  "notice that your baby has stopped moving or is moving less than normal.  You have signs of heart failure, such as:  New or increased shortness of breath.  New or worse swelling in your legs, ankles, or feet.  Sudden weight gain, such as more than 2 to 3 pounds in a day or 5 pounds in a week.  Feeling so tired or weak that you cannot do your usual activities.  You have symptoms of a urinary tract infection. These may include:  Pain or burning when you urinate.  A frequent need to urinate without being able to pass much urine.  Pain in your low back (below the rib cage and above the waist).  Blood in your urine.  Watch closely for changes in your health, and be sure to contact your doctor if:  You have vaginal discharge that smells bad.  You feel sad, anxious, or hopeless for more than a few days.  You have skin changes, such as a rash, itching, or a yellow color to your skin.  You have other concerns about your pregnancy.  If you have labor signs at 37 weeks or more  If you have signs of labor at 37 weeks or more, your doctor may tell you to call when your labor becomes more active. During active labor:  Contractions happen more often and at regular intervals (about every 3 to 5 minutes).  Contractions last longer (about 60 seconds or more).  Contractions get stronger and are hard to talk through.  Follow-up care is a key part of your treatment and safety. Be sure to make and go to all appointments, and call your doctor if you are having problems. It's also a good idea to know your test results and keep a list of the medicines you take.  Where can you learn more?  Go to https://www.[employer].net/patiented  Enter N531 in the search box to learn more about \"Learning About When to Call Your Doctor During Pregnancy (After 20 Weeks).\"  Current as of: April 30, 2024  Content Version: 14.5    9440-0104 [facility] "[employer].   Care instructions adapted under license by your healthcare professional. If you have questions " about a medical condition or this instruction, always ask your healthcare professional. [employer] disclaims any warranty or liability for your use of this information.

## 2025-06-18 ENCOUNTER — LAB REQUISITION (OUTPATIENT)
Dept: LAB | Facility: CLINIC | Age: 30
End: 2025-06-18
Payer: COMMERCIAL

## 2025-06-18 DIAGNOSIS — Z3A.36 36 WEEKS GESTATION OF PREGNANCY: ICD-10-CM

## 2025-06-18 PROCEDURE — 87653 STREP B DNA AMP PROBE: CPT | Mod: ORL | Performed by: ADVANCED PRACTICE MIDWIFE

## 2025-06-18 PROCEDURE — 87653 STREP B DNA AMP PROBE: CPT | Performed by: ADVANCED PRACTICE MIDWIFE

## 2025-06-19 LAB — GP B STREP DNA SPEC QL NAA+PROBE: NEGATIVE
